# Patient Record
Sex: MALE | Race: WHITE | NOT HISPANIC OR LATINO | Employment: OTHER | URBAN - METROPOLITAN AREA
[De-identification: names, ages, dates, MRNs, and addresses within clinical notes are randomized per-mention and may not be internally consistent; named-entity substitution may affect disease eponyms.]

---

## 2017-02-16 ENCOUNTER — OFFICE VISIT (OUTPATIENT)
Dept: NEUROLOGY | Facility: CLINIC | Age: 62
End: 2017-02-16
Payer: COMMERCIAL

## 2017-02-16 VITALS
BODY MASS INDEX: 30.46 KG/M2 | RESPIRATION RATE: 18 BRPM | HEART RATE: 68 BPM | DIASTOLIC BLOOD PRESSURE: 80 MMHG | HEIGHT: 72 IN | SYSTOLIC BLOOD PRESSURE: 110 MMHG | WEIGHT: 224.88 LBS | TEMPERATURE: 98 F

## 2017-02-16 DIAGNOSIS — G43.009 MIGRAINE WITHOUT AURA, WITHOUT MENTION OF INTRACTABLE MIGRAINE WITHOUT MENTION OF STATUS MIGRAINOSUS: Primary | ICD-10-CM

## 2017-02-16 PROCEDURE — 3079F DIAST BP 80-89 MM HG: CPT | Mod: S$GLB,,, | Performed by: PSYCHIATRY & NEUROLOGY

## 2017-02-16 PROCEDURE — 99214 OFFICE O/P EST MOD 30 MIN: CPT | Mod: S$GLB,,, | Performed by: PSYCHIATRY & NEUROLOGY

## 2017-02-16 PROCEDURE — 3074F SYST BP LT 130 MM HG: CPT | Mod: S$GLB,,, | Performed by: PSYCHIATRY & NEUROLOGY

## 2017-02-16 PROCEDURE — 99999 PR PBB SHADOW E&M-EST. PATIENT-LVL III: CPT | Mod: PBBFAC,,, | Performed by: PSYCHIATRY & NEUROLOGY

## 2017-02-16 RX ORDER — DICLOFENAC POTASSIUM 50 MG/1
1 POWDER, FOR SOLUTION ORAL DAILY PRN
Qty: 9 EACH | Refills: 4 | Status: SHIPPED | OUTPATIENT
Start: 2017-02-16 | End: 2018-08-22

## 2017-02-16 NOTE — PROGRESS NOTES
Subjective:       Patient ID: Booker Back is a 61 y.o. male.    Chief Complaint: Headache  INTERVAL HISTORY  The patient comes for follow up with his headache diary. He has had about 14 but may be up to 20 headaches in a 70 day period as he did not chart while on vacation. He is overall a bit better. He starts treating escalations with Zofran, then go to Ibuprofen, then either Relpax or Migranal and rescues with Fioricet. He averages 1 Fioricet per month.   HPI    The patient is a pleasant 60 y/o male with long time history of headaches. Over the years he has seen neurologists headache specialists and has received expert care. In addition to experiencing tension typt headache and migraine headache, he describes an episode of Ophthalmic Migraine, characterized by loss of vision lasting 2 to 3 hours, not followed by head pain which occurred 10 to 15 years prior to two episodes of Transient Global Amnesia. They happened in 2013 and they were about a month apart. He was admitted to the hospital where imaging was obtained as well as EEG all of which was reportedly negative. He was given the diagnosis of TGA, ex juvantivus. He states that it was after the episodes of TGA when he started having migraine headaches. About 12 years ago he was diagnosed with Medication overuse headache, he was taking Advil 400 mg twice daily. He saw Dr. Nettie Elena who advised him to stop taking Advil. His headaches were resolved but his back pain flared up. His back pain was so severe that he started taking Advil again resulting in headache relapse. In order to treat his back pain he underwent interventional procedures including MBB and RFA without significant relief. When offered a spinal cord stimulator he pursued a second opinion. Instead of having the SCS placed he decided to undergo trans foraminal injections. When done at one level worked about 30% but when given at 3 levels it worked very well. He ascribes a lot of his headaches to  ""sinus problems." He had 4 headaches last month of October, thus far he has had 4 headaches in November. This increased is attributed to recent sinus surgery. His past medical history is also noteworthy for bariatric surgery, a sleeve stomach reduction which has resulted in profound weight loss. He is overall pleased on his current regime which includes Zonisamide 200 mg daily, Zofran 8 mg prn, Migranal alternating with Relpax and limited doses of Fioricet for rescue. For prevention, he has also tried Gabapentin for 1 month, poorly tolerated.  He is not a candidate for Topamax because of risk of kidney stones. He cannot take Beta blockers because of history of asthma. He has taken Elvil in the past for reactive depression with no effect on his headaches.   Please see details of headache characteristics headache below.  Headache questionnaire     1. When did your Headaches start?    Age 9     2. Where are your headaches located?   Usually left temple      3. Your headache's characteristics:  Excruciating, Pressure, Throbbing, Pounding, Stabbing, Like a tight band        4. How long does the headache last?  Hours, days     5. How often does the headache occur?  Worst when sinus problems        6. Are your headaches preceded or accompanied by other symptoms? yes  If yes, please describe. Sometimes photophobia, occasional aura        7. Does the headache awaken you at night? no  If so, how often?            8. Please laura the word that best describes your headache's intensity:    moderate        9. Using a scale of 1 through 10, with 0 = no pain and 10 = the worst pain:  What score is your headache now? 0  What score is your headache at its worst? 9  What score is your headache at its best? 1         10. Possible associated headache symptoms:  [x]  Sensitivity to light  [] Dizziness  [x] Nasal or sinus pressure/ pain   [x] Sensitivity to noise  [] Vertigo  [] Problems with concentration  [] Sensitivity to smells  [] " Ringing in ears  [] Problems with memory    [] Blurred vision  [x] Irritability  [x] Problems with task completion    [] Double vision  [] Anger  [x]  Problems with relaxation  [x] Loss of appetite  [] Anxiety  [x] Neck tightness, Neck pain  [x] Nausea   [x] Nasal congestion  [] Vomiting           11. Headache improving factors:  [x] Sleep  [] Heat  [x] Darkness  [x] Ice  [x] Local pressure [] Menses (period)  [] Massage   [x] Medications:         12. Headache worsening factors:    [x] Fatigue [x] Sneezing  [x] Changes in Weather  [x] Light [] Bending Over [] Stress  [x] Noise [] Ovulation  [] Multiple Sclerosis Flare-Up  [] Smells  [] Menses  [] Food    [x] Coughing [] Alcohol        13. Number of caffeinated drinks per day: 0        14. Number of diet drinks per day: 0     Review of Systems   Constitutional: Positive for activity change (decreased after the sinus surgery). Negative for appetite change, chills, fatigue and fever.   HENT: Positive for congestion, postnasal drip, rhinorrhea and sinus pressure. Negative for dental problem, ear pain, hearing loss, tinnitus, trouble swallowing and voice change.    Eyes: Negative for photophobia, pain and visual disturbance.   Respiratory: Negative for cough, chest tightness and shortness of breath.    Cardiovascular: Negative for chest pain, palpitations and leg swelling.   Gastrointestinal: Negative for abdominal pain, blood in stool, constipation, diarrhea, nausea and vomiting.   Endocrine: Negative for cold intolerance and heat intolerance.   Genitourinary: Negative for difficulty urinating, dysuria and urgency.   Musculoskeletal: Negative for arthralgias, back pain, gait problem, myalgias, neck pain and neck stiffness.   Skin: Negative for color change, pallor and rash.   Neurological: Positive for headaches. Negative for dizziness, tremors, seizures, syncope, facial asymmetry, speech difficulty, weakness, light-headedness and numbness.   Hematological: Negative for  adenopathy. Does not bruise/bleed easily.   Psychiatric/Behavioral: Negative for agitation, behavioral problems, confusion, decreased concentration, self-injury, sleep disturbance and suicidal ideas. The patient is not nervous/anxious.          Past Medical History   Diagnosis Date    Abnormal liver enzymes     Anemia     Arthritis     Asthma     Azotemia     Diverticulosis     Esophageal reflux     H/O Nasal MRSA      Hepatitis, unspecified     Hypotension     Infectious mononucleosis     Insomnia     Instability of knee joint     Left Axillary Furuncle 1/26/16     Migraine     Obesity, unspecified     ALPHONSO on CPAP     Other and unspecified hyperlipidemia     Other seborrheic keratosis     Other specified disorder of male genital organs(608.89)     Pulmonary nodule     Renal stone     Seasonal allergic rhinitis     Snoring     Spontaneous ecchymoses     Unspecified essential hypertension     Unspecified vitamin D deficiency     Variants of migraine, not elsewhere classified, without mention of intractable migraine without mention of status migrainosus      Past Surgical History   Procedure Laterality Date    Appendectomy      [place] tooth extraction      Inguinal hernia repair      Wrist fracture surgery Left     Gastric sleeve  11/2012    Radiofrequency ablation       lumbar back    Esophageal dilation       2016    Transformal injections       x 2     Family History   Problem Relation Age of Onset    Hypertension Mother     Stroke Mother     Cancer Father      throat    Alcohol abuse Father     Heart disease Father     Heart attacks under age 50 Father 49     Social History     Social History    Marital status:      Spouse name: N/A    Number of children: N/A    Years of education: N/A     Occupational History    Not on file.     Social History Main Topics    Smoking status: Never Smoker    Smokeless tobacco: Not on file    Alcohol use No    Drug use: No     Sexual activity: Not on file     Other Topics Concern    Not on file     Social History Narrative     Allergies   Allergen Reactions    Tetracyclines Rash       Current Outpatient Prescriptions:     butalbital-acetaminophen-caffeine -40 mg (FIORICET, ESGIC) -40 mg per tablet, Take 1 tablet by mouth 3 (three) times daily as needed for Pain., Disp: 10 tablet, Rfl: 3    celecoxib (CELEBREX) 200 MG capsule, Take 200 mg by mouth as needed for Pain., Disp: , Rfl:     dexlansoprazole (DEXILANT) 30 mg CpDM, Take 60 mg by mouth once daily. , Disp: , Rfl:     eszopiclone 3 mg Tab, Take 1 tablet by mouth every evening., Disp: , Rfl:     ferrous sulfate 325 mg (65 mg iron) Tab tablet, Take 325 mg by mouth 2 (two) times daily. , Disp: , Rfl: 2    hydrochlorothiazide (MICROZIDE) 12.5 mg capsule, Take 1 capsule (12.5 mg total) by mouth once daily., Disp: 90 capsule, Rfl: 0    irbesartan (AVAPRO) 150 MG tablet, Take 1 tablet (150 mg total) by mouth every evening. (Patient taking differently: Take 150 mg by mouth once daily. ), Disp: 90 tablet, Rfl: 3    levocetirizine (XYZAL) 5 MG tablet, Take 5 mg by mouth every evening.  , Disp: , Rfl:     melatonin 10 mg Cap, Take 1 capsule by mouth every evening. , Disp: , Rfl:     METHYLCELLULOSE, WITH SUGAR, (CITRUCEL, SUCROSE, ORAL), Take 1 Dose by mouth every evening., Disp: , Rfl:     ondansetron (ZOFRAN-ODT) 8 MG TbDL, Take 1 tablet (8 mg total) by mouth every 6 (six) hours as needed., Disp: 30 tablet, Rfl: 3    oxycodone-acetaminophen (PERCOCET)  mg per tablet, Take 1 tablet by mouth every 4 (four) hours as needed for Pain., Disp: , Rfl:     oxymetazoline (AFRIN) 0.05 % nasal spray, 1 spray by Nasal route every evening. , Disp: , Rfl:     phenobarb-hyoscy-atropine-scop (DONNATAL) 16.2 mg-0.1037 mg/5 mL (5 mL) Elix, Take 15 mLs by mouth daily as needed., Disp: , Rfl:     polyethylene glycol (GLYCOLAX) 17 gram/dose powder, Take 17 g by mouth once daily. ,  Disp: , Rfl:     RELPAX 40 mg tablet, Take 1 tablet (40 mg total) by mouth daily as needed., Disp: 9 tablet, Rfl: 4    tizanidine (ZANAFLEX) 4 MG tablet, TAKE 1 TABLET BY MOUTH THREE TIMES DAILY (Patient taking differently: TAKE 1 TABLET BY MOUTH THREE TIMES DAILY prn), Disp: 270 tablet, Rfl: 0    vitamin D 1000 units Tab, Take 5,000 Units by mouth once daily., Disp: , Rfl:     vitamin E 100 UNIT capsule, Take 400 Units by mouth 2 (two) times daily. , Disp: , Rfl:     XOPENEX HFA 45 mcg/actuation inhaler, , Disp: , Rfl: 6    zonisamide (ZONEGRAN) 100 MG Cap, Take 200 mg by mouth once daily. , Disp: , Rfl: 2    dihydroergotamine (MIGRANAL) 0.5 mg/pump act. (4 mg/mL) nasal spray, Use in one nostril as directed.  No more than 4 sprays in one hour Please provide the patient with 90 day supply, Disp: 24 mL, Rfl: 3      Objective:      Vitals:    02/16/17 1552   BP: 110/80   Pulse: 68   Resp: 18   Temp: 98 °F (36.7 °C)     Body mass index is 30.5 kg/(m^2).    Physical Exam    Constitutional:     He appears well-developed and well-nourished. He is well groomed  HENT:    Head: Atraumatic, oral and nasal mucosa intact  Eyes: Conjunctivae and EOM are normal. Pupils are equal, round, and reactive to light OU  Neck: Neck supple. No thyromegaly present  Cardiovascular: Normal rate and normal heart sounds  No murmur heard  Pulmonary/Chest: Effort normal and breath sounds normal  Skin: Skin is warm and dry  Psychiatric: Normal mood and affect     Neuro exam:  MINI MENTAL ESTATE EXAM 30/30  Mental status:  Awake, attentive, Alert, oriented to self, place, year and month  Language function is intact  Naming, repetition and spontaneous meaningful speech expression are intact  Speech fluency is good and speech is clear  Remote and recent memory are good  Patient able to count backwards by 7    No findings to suggest executive dysfuntion    Patient has adequate insight    Mood is stable    Cranial Nerves:  Smell was not formally  evaluated  Cranial Nerves II - XII: intact  Pursuits were smooth, normal saccades, no nystagmus OU  Motor - facial movement was symmetrical and normal     Palate moved well and was symmetrical with normal palatal and oral sensation  Tongue movements were full    Coordination:     Rapid alternating movements and rapid finger tapping - normal bilaterally  Finger to nose - normal and symmetric bilaterally     Motor:  Normal muscle bulk and symmetry. No fasciculations were noted    No pronator drift  Strength 5/5 bilaterally     Reflexes:  Tendon reflexes were 2 + at biceps, triceps, brachioradialis, patellar, and 1+ Achilles bilaterally    Gait: Normal gait.         Assessment and Plan     Migraine without Aura, doing fairly well on his current protocol. Continue with Zonisamide 200 mg daily and B2 400 mg daily for added prevention. For acute treatment continue with Zofran prn, and Relpax, alternating with Migranal. Can also use Cambia with limit of #9 per month, and rescue with Fioricet. He has averaged 1 dose per month.  Status post 2 episodes of Transient Global Amnesia. Obtain PET scan if recurrence  Status post Medication Overuse Headache, resolved. Patient well educated regarding this diagnosis    RTC in 3 months with headache diary    Counseling:  More than 50% of the 25 minute encounter was spent face to face counseling the patient regarding current status and future plan of care as well as side of the medications. All questions were answered to patient's satisfaction      Dana Conn M.D  Medical Director, Headache and Facial Pain  [facility]

## 2017-02-16 NOTE — MR AVS SNAPSHOT
[facility]  [address] [place] [address]  Phone: [phone]  Fax: [phone]                  Booker Back   2017 3:45 PM   Office Visit    Description:  Male : 1955   Provider:  Chasidy Conn MD   Department:  [facility] - [facility]rology           Reason for Visit     Headache                To Do List           Future Appointments        Provider Department Dept Phone    2017 3:30 PM Chasidy Conn MD [facility] [phone]    2017 1:15 PM Chasidy Conn MD [facility] [phone]      Goals (5 Years of Data)     None       These Medications        Disp Refills Start End    diclofenac potassium (CAMBIA) 50 mg PwPk 9 each 4 2017 3/18/2017    Take 1 Package by mouth daily as needed. - Oral    Pharmacy: [facility] [address]  #: [phone]         [facility]     [facility] -  Assistance  Registered nurses in the [facility] provide clinical advisement, health education, appointment booking, and other advisory services.  Call for this free service at [phone].             Medications           Message regarding Medications     Verify the changes and/or additions to your medication regime listed below are the same as discussed with your clinician today.  If any of these changes or additions are incorrect, please notify your healthcare provider.        START taking these NEW medications        Refills    diclofenac potassium (CAMBIA) 50 mg PwPk 4    Sig: Take 1 Package by mouth daily as needed.    Class: Normal    Route: Oral      STOP taking these medications     oxymetazoline (AFRIN) 0.05 % nasal spray 1 spray by Nasal route every evening.            Verify that the below list of medications is an accurate representation of the medications you are currently taking.  If none reported, the list may be blank. If incorrect,  please contact your healthcare provider. Carry this list with you in case of emergency.           Current Medications     celecoxib (CELEBREX) 200 MG capsule Take 200 mg by mouth as needed for Pain.    dexlansoprazole (DEXILANT) 30 mg CpDM Take 60 mg by mouth once daily.     diclofenac potassium (CAMBIA) 50 mg PwPk Take 1 Package by mouth daily as needed.    dihydroergotamine (MIGRANAL) 0.5 mg/pump act. (4 mg/mL) nasal spray Use in one nostril as directed.  No more than 4 sprays in one hour  Please provide the patient with 90 day supply    eletriptan (RELPAX) 40 MG tablet may repeat in 2 hours if necessary  Allow 24 tablets, a 90 day supply    eszopiclone 3 mg Tab Take 1 tablet by mouth every evening.    ferrous sulfate 325 mg (65 mg iron) Tab tablet Take 325 mg by mouth 2 (two) times daily.     hydrochlorothiazide (MICROZIDE) 12.5 mg capsule Take 1 capsule (12.5 mg total) by mouth once daily.    irbesartan (AVAPRO) 150 MG tablet Take 1 tablet (150 mg total) by mouth every evening.    levocetirizine (XYZAL) 5 MG tablet Take 5 mg by mouth every evening.      melatonin 10 mg Cap Take 1 capsule by mouth every evening.     METHYLCELLULOSE, WITH SUGAR, (CITRUCEL, SUCROSE, ORAL) Take 1 Dose by mouth every evening.    ondansetron (ZOFRAN-ODT) 8 MG TbDL Take 1 tablet (8 mg total) by mouth every 6 (six) hours as needed.    oxycodone-acetaminophen (PERCOCET)  mg per tablet Take 1 tablet by mouth every 4 (four) hours as needed for Pain.    phenobarb-hyoscy-atropine-scop (DONNATAL) 16.2 mg-0.1037 mg/5 mL (5 mL) Elix Take 15 mLs by mouth daily as needed.    polyethylene glycol (GLYCOLAX) 17 gram/dose powder Take 17 g by mouth once daily.     tizanidine (ZANAFLEX) 4 MG tablet TAKE 1 TABLET BY MOUTH THREE TIMES DAILY    tizanidine (ZANAFLEX) 4 MG tablet TAKE 1 TABLET BY MOUTH THREE TIMES DAILY    vitamin D 1000 units Tab Take 5,000 Units by mouth once daily.    vitamin E 100 UNIT capsule Take 400 Units by mouth 2 (two) times  daily.     XOPENEX HFA 45 mcg/actuation inhaler     zonisamide (ZONEGRAN) 100 MG Cap Take 200 mg by mouth once daily.            Clinical Reference Information           Your Vitals Were     BP Pulse Temp Resp Height Weight    110/80 (BP Location: Left arm, Patient Position: Sitting, BP Method: Automatic) 68 98 °F (36.7 °C) (Oral) 18 6' (1.829 m) 102 kg (224 lb 13.9 oz)    BMI                30.5 kg/m2          Blood Pressure          Most Recent Value    BP  110/80      Allergies as of 2/16/2017     Tetracyclines      Immunizations Administered on Date of Encounter - 2/16/2017     None      Language Assistance Services     ATTENTION: Language assistance services are available, free of charge. Please call [phone].      ATENCIÓN: Si newton nayely, tiene a munguia disposición servicios gratuitos de asistencia lingüística. Llame al [phone].     CHÚ Ý: N?u b?n nói Ti?ng Vi?t, có các d?ch v? h? tr? ngôn ng? mi?n phí dành cho b?n. G?i s? [phone].         [facility] complies with applicable Federal civil rights laws and does not discriminate on the basis of race, color, national origin, age, disability, or sex.

## 2017-02-20 ENCOUNTER — TELEPHONE (OUTPATIENT)
Dept: NEUROLOGY | Facility: CLINIC | Age: 62
End: 2017-02-20

## 2017-02-21 RX ORDER — BUTALBITAL, ACETAMINOPHEN AND CAFFEINE 50; 325; 40 MG/1; MG/1; MG/1
1 TABLET ORAL 3 TIMES DAILY PRN
Qty: 10 TABLET | Refills: 3 | Status: SHIPPED | OUTPATIENT
Start: 2017-02-21 | End: 2017-03-23

## 2017-03-17 RX ORDER — ELETRIPTAN HYDROBROMIDE 40 MG/1
40 TABLET, FILM COATED ORAL
Qty: 24 TABLET | Refills: 2 | Status: CANCELLED | OUTPATIENT
Start: 2017-03-17

## 2017-03-20 RX ORDER — ELETRIPTAN HYDROBROMIDE 40 MG/1
TABLET, FILM COATED ORAL
Qty: 24 TABLET | Refills: 2 | OUTPATIENT
Start: 2017-03-20 | End: 2019-01-24 | Stop reason: SDUPTHER

## 2017-03-20 NOTE — TELEPHONE ENCOUNTER
Please check the directions first.    Received fax from [facility] pharmacy requesting a refill on Relpax.

## 2017-04-12 ENCOUNTER — PATIENT MESSAGE (OUTPATIENT)
Dept: NEUROLOGY | Facility: CLINIC | Age: 62
End: 2017-04-12

## 2017-04-17 PROBLEM — G43.719 CHRONIC MIGRAINE WITHOUT AURA, WITH INTRACTABLE MIGRAINE, SO STATED, WITHOUT MENTION OF STATUS MIGRAINOSUS: Status: ACTIVE | Noted: 2017-04-17

## 2017-04-19 ENCOUNTER — PATIENT MESSAGE (OUTPATIENT)
Dept: NEUROLOGY | Facility: CLINIC | Age: 62
End: 2017-04-19

## 2017-04-20 NOTE — TELEPHONE ENCOUNTER
Patient would like to know how long to go without the pain meds and also if his compound cream is okay to use or may cause rebound headaches. Please see below and advise.

## 2017-04-28 ENCOUNTER — PATIENT MESSAGE (OUTPATIENT)
Dept: NEUROLOGY | Facility: CLINIC | Age: 62
End: 2017-04-28

## 2017-05-08 ENCOUNTER — PATIENT MESSAGE (OUTPATIENT)
Dept: NEUROLOGY | Facility: CLINIC | Age: 62
End: 2017-05-08

## 2017-05-22 ENCOUNTER — PATIENT MESSAGE (OUTPATIENT)
Dept: NEUROLOGY | Facility: CLINIC | Age: 62
End: 2017-05-22

## 2017-05-23 RX ORDER — BUTALBITAL, ASPIRIN AND CAFFEINE 50; 325; 40 MG/1; MG/1; MG/1
1 TABLET ORAL 4 TIMES DAILY PRN
Qty: 15 TABLET | Refills: 2 | Status: SHIPPED | OUTPATIENT
Start: 2017-05-23 | End: 2017-06-22

## 2017-05-30 ENCOUNTER — OFFICE VISIT (OUTPATIENT)
Dept: NEUROLOGY | Facility: CLINIC | Age: 62
End: 2017-05-30
Payer: COMMERCIAL

## 2017-05-30 VITALS
SYSTOLIC BLOOD PRESSURE: 137 MMHG | HEIGHT: 72 IN | BODY MASS INDEX: 31.36 KG/M2 | DIASTOLIC BLOOD PRESSURE: 80 MMHG | WEIGHT: 231.5 LBS | HEART RATE: 62 BPM | RESPIRATION RATE: 17 BRPM

## 2017-05-30 DIAGNOSIS — N28.9 RENAL INSUFFICIENCY: ICD-10-CM

## 2017-05-30 DIAGNOSIS — I10 ESSENTIAL HYPERTENSION: ICD-10-CM

## 2017-05-30 DIAGNOSIS — K21.9 GASTROESOPHAGEAL REFLUX DISEASE WITHOUT ESOPHAGITIS: ICD-10-CM

## 2017-05-30 DIAGNOSIS — G47.00 INSOMNIA, UNSPECIFIED TYPE: ICD-10-CM

## 2017-05-30 DIAGNOSIS — G43.009 MIGRAINE WITHOUT AURA, WITHOUT MENTION OF INTRACTABLE MIGRAINE WITHOUT MENTION OF STATUS MIGRAINOSUS: Primary | ICD-10-CM

## 2017-05-30 PROCEDURE — 99999 PR PBB SHADOW E&M-EST. PATIENT-LVL III: CPT | Mod: PBBFAC,,, | Performed by: PSYCHIATRY & NEUROLOGY

## 2017-05-30 PROCEDURE — 99214 OFFICE O/P EST MOD 30 MIN: CPT | Mod: S$GLB,,, | Performed by: PSYCHIATRY & NEUROLOGY

## 2017-05-30 RX ORDER — LIDOCAINE AND PRILOCAINE 25; 25 MG/G; MG/G
CREAM TOPICAL
COMMUNITY
Start: 2017-04-13 | End: 2018-04-03

## 2017-05-30 RX ORDER — IBUPROFEN 800 MG/1
800 TABLET ORAL
COMMUNITY
End: 2018-01-11

## 2017-05-30 RX ORDER — BECLOMETHASONE DIPROPIONATE 80 UG/1
1 AEROSOL, METERED NASAL 2 TIMES DAILY
COMMUNITY
Start: 2017-03-16

## 2017-05-30 RX ORDER — RIBOFLAVIN (VITAMIN B2) 400 MG
1 TABLET ORAL NIGHTLY
COMMUNITY
End: 2021-07-01

## 2017-05-30 RX ORDER — ACETAMINOPHEN 500 MG
5000 TABLET ORAL
COMMUNITY
End: 2017-05-30

## 2017-05-30 RX ORDER — BUTALBITAL, ACETAMINOPHEN AND CAFFEINE 50; 325; 40 MG/1; MG/1; MG/1
TABLET ORAL
Refills: 3 | COMMUNITY
Start: 2017-05-11 | End: 2017-07-03 | Stop reason: SDUPTHER

## 2017-05-30 RX ORDER — ONDANSETRON 4 MG/1
4 TABLET, FILM COATED ORAL
COMMUNITY
Start: 2017-03-17 | End: 2017-09-22 | Stop reason: DRUGHIGH

## 2017-05-30 RX ORDER — OXYCODONE AND ACETAMINOPHEN 10; 325 MG/1; MG/1
1 TABLET ORAL
COMMUNITY
End: 2020-05-22

## 2017-05-30 NOTE — PROGRESS NOTES
Subjective:       Patient ID: Booker Back is a 61 y.o. male.    Chief Complaint: Headache  INTERVAL HISTORY  The patient comes for follow up with his headache diary. He has daily headache of varying intensity ranging between 3 and 5. He is overall a bit better since the IV infusions, the intensity and frequency as well as the use of medicines is reduced. He starts treating escalations with Zofran, then go to Ibuprofen, then either Relpax or Migranal and rescues with Fioricet. He averages 10 Fioricet per month. He is also on Percocet for other conditions but takes about 40 per month.  HPI    The patient is a pleasant 60 y/o male with long time history of headaches. Over the years he has seen neurologists headache specialists and has received expert care. In addition to experiencing tension typt headache and migraine headache, he describes an episode of Ophthalmic Migraine, characterized by loss of vision lasting 2 to 3 hours, not followed by head pain which occurred 10 to 15 years prior to two episodes of Transient Global Amnesia. They happened in 2013 and they were about a month apart. He was admitted to the hospital where imaging was obtained as well as EEG all of which was reportedly negative. He was given the diagnosis of TGA, ex juvantivus. He states that it was after the episodes of TGA when he started having migraine headaches. About 12 years ago he was diagnosed with Medication overuse headache, he was taking Advil 400 mg twice daily. He saw Dr. Nettie Elena who advised him to stop taking Advil. His headaches were resolved but his back pain flared up. His back pain was so severe that he started taking Advil again resulting in headache relapse. In order to treat his back pain he underwent interventional procedures including MBB and RFA without significant relief. When offered a spinal cord stimulator he pursued a second opinion. Instead of having the SCS placed he decided to undergo trans foraminal injections.  "When done at one level worked about 30% but when given at 3 levels it worked very well. He ascribes a lot of his headaches to "sinus problems." He had 4 headaches last month of October, thus far he has had 4 headaches in November. This increased is attributed to recent sinus surgery. His past medical history is also noteworthy for bariatric surgery, a sleeve stomach reduction which has resulted in profound weight loss. He is overall pleased on his current regime which includes Zonisamide 200 mg daily, Zofran 8 mg prn, Migranal alternating with Relpax and limited doses of Fioricet for rescue. For prevention, he has also tried Gabapentin for 1 month, poorly tolerated. Botox, 3 rounds by Dr. Ky Jensen provided no relief.  He is not a candidate for Topamax because of risk of kidney stones. He cannot take Beta blockers because of history of asthma. He has taken Elavil in the past for reactive depression with no effect on his headaches.   Please see details of headache characteristics headache below.  Headache questionnaire     1. When did your Headaches start?    Age 9     2. Where are your headaches located?   Usually left temple      3. Your headache's characteristics:  Excruciating, Pressure, Throbbing, Pounding, Stabbing, Like a tight band        4. How long does the headache last?  Hours, days     5. How often does the headache occur?  Worst when sinus problems        6. Are your headaches preceded or accompanied by other symptoms? yes  If yes, please describe. Sometimes photophobia, occasional aura        7. Does the headache awaken you at night? no  If so, how often?            8. Please laura the word that best describes your headache's intensity:    moderate        9. Using a scale of 1 through 10, with 0 = no pain and 10 = the worst pain:  What score is your headache now? 0  What score is your headache at its worst? 9  What score is your headache at its best? 1         10. Possible associated headache " symptoms:  [x]  Sensitivity to light  [] Dizziness  [x] Nasal or sinus pressure/ pain   [x] Sensitivity to noise  [] Vertigo  [] Problems with concentration  [] Sensitivity to smells  [] Ringing in ears  [] Problems with memory    [] Blurred vision  [x] Irritability  [x] Problems with task completion    [] Double vision  [] Anger  [x]  Problems with relaxation  [x] Loss of appetite  [] Anxiety  [x] Neck tightness, Neck pain  [x] Nausea   [x] Nasal congestion  [] Vomiting           11. Headache improving factors:  [x] Sleep  [] Heat  [x] Darkness  [x] Ice  [x] Local pressure [] Menses (period)  [] Massage   [x] Medications:         12. Headache worsening factors:    [x] Fatigue [x] Sneezing  [x] Changes in Weather  [x] Light [] Bending Over [] Stress  [x] Noise [] Ovulation  [] Multiple Sclerosis Flare-Up  [] Smells  [] Menses  [] Food    [x] Coughing [] Alcohol        13. Number of caffeinated drinks per day: 0        14. Number of diet drinks per day: 0     Review of Systems   Constitutional:  Negative for appetite change, chills, fatigue and fever.   HENT: Negative for dental problem, ear pain, hearing loss, tinnitus, trouble swallowing and voice change.    Eyes: Negative for photophobia, pain and visual disturbance.   Respiratory: Negative for cough, chest tightness and shortness of breath.    Cardiovascular: Negative for chest pain, palpitations and leg swelling.   Gastrointestinal: Negative for abdominal pain, blood in stool, constipation, diarrhea, nausea and vomiting.   Endocrine: Negative for cold intolerance and heat intolerance.   Genitourinary: Negative for difficulty urinating, dysuria and urgency.   Musculoskeletal: Negative for arthralgias, back pain, gait problem, myalgias, neck pain and neck stiffness.   Skin: Negative for color change, pallor and rash.   Neurological: Positive for headaches. Negative for dizziness, tremors, seizures, syncope, facial asymmetry, speech difficulty, weakness,  light-headedness and numbness.   Hematological: Negative for adenopathy. Does not bruise/bleed easily.   Psychiatric/Behavioral: Negative for agitation, behavioral problems, confusion, decreased concentration, self-injury, sleep disturbance and suicidal ideas. The patient is not nervous/anxious.          Past Medical History   Diagnosis Date    Abnormal liver enzymes     Anemia     Arthritis     Asthma     Azotemia     Diverticulosis     Esophageal reflux     H/O Nasal MRSA      Hepatitis, unspecified     Hypotension     Infectious mononucleosis     Insomnia     Instability of knee joint     Left Axillary Furuncle 1/26/16     Migraine     Obesity, unspecified     ALPHONSO on CPAP     Other and unspecified hyperlipidemia     Other seborrheic keratosis     Other specified disorder of male genital organs(608.89)     Pulmonary nodule     Renal stone     Seasonal allergic rhinitis     Snoring     Spontaneous ecchymoses     Unspecified essential hypertension     Unspecified vitamin D deficiency     Variants of migraine, not elsewhere classified, without mention of intractable migraine without mention of status migrainosus      Past Surgical History   Procedure Laterality Date    Appendectomy      [place] tooth extraction      Inguinal hernia repair      Wrist fracture surgery Left     Gastric sleeve  11/2012    Radiofrequency ablation       lumbar back    Esophageal dilation       2016    Transformal injections       x 2     Family History   Problem Relation Age of Onset    Hypertension Mother     Stroke Mother     Cancer Father      throat    Alcohol abuse Father     Heart disease Father     Heart attacks under age 50 Father 49     Social History     Social History    Marital status:      Spouse name: N/A    Number of children: N/A    Years of education: N/A     Occupational History    Not on file.     Social History Main Topics    Smoking status: Never Smoker    Smokeless  tobacco: Not on file    Alcohol use No    Drug use: No    Sexual activity: Not on file     Other Topics Concern    Not on file     Social History Narrative     Allergies   Allergen Reactions    Tetracyclines Rash       Current Outpatient Prescriptions:     butalbital-acetaminophen-caffeine -40 mg (FIORICET, ESGIC) -40 mg per tablet, Take 1 tablet by mouth 3 (three) times daily as needed for Pain., Disp: 10 tablet, Rfl: 3    celecoxib (CELEBREX) 200 MG capsule, Take 200 mg by mouth as needed for Pain., Disp: , Rfl:     dexlansoprazole (DEXILANT) 30 mg CpDM, Take 60 mg by mouth once daily. , Disp: , Rfl:     eszopiclone 3 mg Tab, Take 1 tablet by mouth every evening., Disp: , Rfl:     ferrous sulfate 325 mg (65 mg iron) Tab tablet, Take 325 mg by mouth 2 (two) times daily. , Disp: , Rfl: 2    hydrochlorothiazide (MICROZIDE) 12.5 mg capsule, Take 1 capsule (12.5 mg total) by mouth once daily., Disp: 90 capsule, Rfl: 0    irbesartan (AVAPRO) 150 MG tablet, Take 1 tablet (150 mg total) by mouth every evening. (Patient taking differently: Take 150 mg by mouth once daily. ), Disp: 90 tablet, Rfl: 3    levocetirizine (XYZAL) 5 MG tablet, Take 5 mg by mouth every evening.  , Disp: , Rfl:     melatonin 10 mg Cap, Take 1 capsule by mouth every evening. , Disp: , Rfl:     METHYLCELLULOSE, WITH SUGAR, (CITRUCEL, SUCROSE, ORAL), Take 1 Dose by mouth every evening., Disp: , Rfl:     ondansetron (ZOFRAN-ODT) 8 MG TbDL, Take 1 tablet (8 mg total) by mouth every 6 (six) hours as needed., Disp: 30 tablet, Rfl: 3    oxycodone-acetaminophen (PERCOCET)  mg per tablet, Take 1 tablet by mouth every 4 (four) hours as needed for Pain., Disp: , Rfl:     oxymetazoline (AFRIN) 0.05 % nasal spray, 1 spray by Nasal route every evening. , Disp: , Rfl:     phenobarb-hyoscy-atropine-scop (DONNATAL) 16.2 mg-0.1037 mg/5 mL (5 mL) Elix, Take 15 mLs by mouth daily as needed., Disp: , Rfl:     polyethylene glycol  (GLYCOLAX) 17 gram/dose powder, Take 17 g by mouth once daily. , Disp: , Rfl:     RELPAX 40 mg tablet, Take 1 tablet (40 mg total) by mouth daily as needed., Disp: 9 tablet, Rfl: 4    tizanidine (ZANAFLEX) 4 MG tablet, TAKE 1 TABLET BY MOUTH THREE TIMES DAILY (Patient taking differently: TAKE 1 TABLET BY MOUTH THREE TIMES DAILY prn), Disp: 270 tablet, Rfl: 0    vitamin D 1000 units Tab, Take 5,000 Units by mouth once daily., Disp: , Rfl:     vitamin E 100 UNIT capsule, Take 400 Units by mouth 2 (two) times daily. , Disp: , Rfl:     XOPENEX HFA 45 mcg/actuation inhaler, , Disp: , Rfl: 6    zonisamide (ZONEGRAN) 100 MG Cap, Take 200 mg by mouth once daily. , Disp: , Rfl: 2    dihydroergotamine (MIGRANAL) 0.5 mg/pump act. (4 mg/mL) nasal spray, Use in one nostril as directed.  No more than 4 sprays in one hour Please provide the patient with 90 day supply, Disp: 24 mL, Rfl: 3      Objective:      Vitals:    05/30/17 1322   BP: 137/80   Pulse: 62   Resp: 17     Body mass index is 31.39 kg/m².      Physical Exam    Constitutional:     He appears well-developed and well-nourished. He is well groomed  HENT:    Head: Atraumatic, oral and nasal mucosa intact  Eyes: Conjunctivae and EOM are normal. Pupils are equal, round, and reactive to light OU  Neck: Neck supple. No thyromegaly present  Cardiovascular: Normal rate and normal heart sounds  No murmur heard  Pulmonary/Chest: Effort normal and breath sounds normal  Skin: Skin is warm and dry  Psychiatric: Normal mood and affect     Neuro exam:  MINI MENTAL ESTATE EXAM 30/30  Mental status:  Awake, attentive, Alert, oriented to self, place, year and month  Language function is intact  Naming, repetition and spontaneous meaningful speech expression are intact  Speech fluency is good and speech is clear  Remote and recent memory are good  Patient able to count backwards by 7    No findings to suggest executive dysfuntion    Patient has adequate insight    Mood is  stable    Cranial Nerves:  Smell was not formally evaluated  Cranial Nerves II - XII: intact  Pursuits were smooth, normal saccades, no nystagmus OU  Motor - facial movement was symmetrical and normal     Palate moved well and was symmetrical with normal palatal and oral sensation  Tongue movements were full    Coordination:     Rapid alternating movements and rapid finger tapping - normal bilaterally  Finger to nose - normal and symmetric bilaterally     Motor:  Normal muscle bulk and symmetry. No fasciculations were noted    No pronator drift  Strength 5/5 bilaterally     Reflexes:  Tendon reflexes were 2 + at biceps, triceps, brachioradialis, patellar, and 1+ Achilles bilaterally    Gait: Normal gait.         Assessment and Plan     Migraine without Aura, doing fairly well on his current protocol. Continue with Zonisamide 200 mg daily and B2 400 mg daily for added prevention. For acute treatment continue with Zofran prn, and Relpax, alternating with Migranal. Can also use Cambia with limit of #9 per month, and rescue with Fioricet. He has averaged 1 dose per month.  Medication Overuse Headache. Working on tapering off medication, he knows he weill be fine once he tapers off acute medications  Status post 2 episodes of Transient Global Amnesia. Obtain PET scan if recurrence      RTC in 3 months with headache diary    Counseling:  More than 50% of the 25 minute encounter was spent face to face counseling the patient regarding current status and future plan of care as well as side of the medications. All questions were answered to patient's satisfaction      Dana Conn M.D  Medical Director, Headache and Facial Pain  [facility] [place]

## 2017-07-03 RX ORDER — BUTALBITAL, ACETAMINOPHEN AND CAFFEINE 50; 325; 40 MG/1; MG/1; MG/1
1 TABLET ORAL EVERY 6 HOURS PRN
Qty: 15 TABLET | Refills: 0 | Status: SHIPPED | OUTPATIENT
Start: 2017-07-03 | End: 2017-08-02 | Stop reason: SDUPTHER

## 2017-07-03 RX ORDER — BUTALBITAL, ASPIRIN, AND CAFFEINE 325; 50; 40 MG/1; MG/1; MG/1
CAPSULE ORAL
Refills: 2 | COMMUNITY
Start: 2017-06-25 | End: 2017-09-07

## 2017-07-05 ENCOUNTER — PATIENT MESSAGE (OUTPATIENT)
Dept: NEUROLOGY | Facility: CLINIC | Age: 62
End: 2017-07-05

## 2017-07-06 ENCOUNTER — PATIENT MESSAGE (OUTPATIENT)
Dept: NEUROLOGY | Facility: CLINIC | Age: 62
End: 2017-07-06

## 2017-08-02 RX ORDER — BUTALBITAL, ACETAMINOPHEN AND CAFFEINE 50; 325; 40 MG/1; MG/1; MG/1
1 TABLET ORAL EVERY 6 HOURS PRN
Qty: 15 TABLET | Refills: 0 | Status: SHIPPED | OUTPATIENT
Start: 2017-08-02 | End: 2017-08-25 | Stop reason: SDUPTHER

## 2017-08-20 ENCOUNTER — PATIENT MESSAGE (OUTPATIENT)
Dept: NEUROLOGY | Facility: CLINIC | Age: 62
End: 2017-08-20

## 2017-08-20 DIAGNOSIS — G43.009 MIGRAINE WITHOUT AURA, WITHOUT MENTION OF INTRACTABLE MIGRAINE WITHOUT MENTION OF STATUS MIGRAINOSUS: Primary | ICD-10-CM

## 2017-08-21 RX ORDER — ZONISAMIDE 100 MG/1
200 CAPSULE ORAL DAILY
Qty: 180 CAPSULE | Refills: 2 | Status: SHIPPED | OUTPATIENT
Start: 2017-08-21 | End: 2019-07-15 | Stop reason: ALTCHOICE

## 2017-08-25 ENCOUNTER — PATIENT MESSAGE (OUTPATIENT)
Dept: NEUROLOGY | Facility: CLINIC | Age: 62
End: 2017-08-25

## 2017-08-25 DIAGNOSIS — G43.009 MIGRAINE WITHOUT AURA, WITHOUT MENTION OF INTRACTABLE MIGRAINE WITHOUT MENTION OF STATUS MIGRAINOSUS: Primary | ICD-10-CM

## 2017-08-25 RX ORDER — BUTALBITAL, ACETAMINOPHEN AND CAFFEINE 50; 325; 40 MG/1; MG/1; MG/1
1 TABLET ORAL EVERY 6 HOURS PRN
Qty: 15 TABLET | Refills: 0 | Status: SHIPPED | OUTPATIENT
Start: 2017-08-30 | End: 2017-09-27 | Stop reason: SDUPTHER

## 2017-09-07 ENCOUNTER — OFFICE VISIT (OUTPATIENT)
Dept: NEUROLOGY | Facility: CLINIC | Age: 62
End: 2017-09-07
Payer: COMMERCIAL

## 2017-09-07 VITALS
WEIGHT: 223.56 LBS | RESPIRATION RATE: 18 BRPM | HEART RATE: 85 BPM | DIASTOLIC BLOOD PRESSURE: 92 MMHG | HEIGHT: 72 IN | TEMPERATURE: 98 F | BODY MASS INDEX: 30.28 KG/M2 | SYSTOLIC BLOOD PRESSURE: 145 MMHG

## 2017-09-07 DIAGNOSIS — I10 ESSENTIAL HYPERTENSION: ICD-10-CM

## 2017-09-07 DIAGNOSIS — G43.719 CHRONIC MIGRAINE WITHOUT AURA, WITH INTRACTABLE MIGRAINE, SO STATED, WITHOUT MENTION OF STATUS MIGRAINOSUS: Primary | ICD-10-CM

## 2017-09-07 PROCEDURE — 3077F SYST BP >= 140 MM HG: CPT | Mod: S$GLB,,, | Performed by: PSYCHIATRY & NEUROLOGY

## 2017-09-07 PROCEDURE — 99214 OFFICE O/P EST MOD 30 MIN: CPT | Mod: S$GLB,,, | Performed by: PSYCHIATRY & NEUROLOGY

## 2017-09-07 PROCEDURE — 3080F DIAST BP >= 90 MM HG: CPT | Mod: S$GLB,,, | Performed by: PSYCHIATRY & NEUROLOGY

## 2017-09-07 PROCEDURE — 3008F BODY MASS INDEX DOCD: CPT | Mod: S$GLB,,, | Performed by: PSYCHIATRY & NEUROLOGY

## 2017-09-07 PROCEDURE — 99999 PR PBB SHADOW E&M-EST. PATIENT-LVL III: CPT | Mod: PBBFAC,,, | Performed by: PSYCHIATRY & NEUROLOGY

## 2017-09-07 NOTE — PROGRESS NOTES
Subjective:       Patient ID: Bokoer Back is a 61 y.o. male.    Chief Complaint: Headache  INTERVAL HISTORY  The patient comes for follow up with his headache diary. Review of the diaries reveal a modest reduction in the use of migraine medications. He starts treating escalations with Zofran, then go to Ibuprofen, then either Relpax or Migranal and rescues with Fioricet. He averages 10 Fioricet per month. He is also on Percocet for his back pain. He is tapering off.  He had RFA on the lumbar area 3 weeks ago and on the left lumbar today. This is coupled with two solid months of physical therapy.    HPI    The patient is a pleasant 60 y/o male with long time history of headaches. Over the years he has seen neurologists headache specialists and has received expert care. In addition to experiencing tension typt headache and migraine headache, he describes an episode of Ophthalmic Migraine, characterized by loss of vision lasting 2 to 3 hours, not followed by head pain which occurred 10 to 15 years prior to two episodes of Transient Global Amnesia. They happened in 2013 and they were about a month apart. He was admitted to the hospital where imaging was obtained as well as EEG all of which was reportedly negative. He was given the diagnosis of TGA, ex juvantivus. He states that it was after the episodes of TGA when he started having migraine headaches. About 12 years ago he was diagnosed with Medication overuse headache, he was taking Advil 400 mg twice daily. He saw Dr. Nettie Elena who advised him to stop taking Advil. His headaches were resolved but his back pain flared up. His back pain was so severe that he started taking Advil again resulting in headache relapse. In order to treat his back pain he underwent interventional procedures including MBB and RFA without significant relief. When offered a spinal cord stimulator he pursued a second opinion. Instead of having the SCS placed he decided to undergo trans  "foraminal injections. When done at one level worked about 30% but when given at 3 levels it worked very well. He ascribes a lot of his headaches to "sinus problems." He had 4 headaches last month of October, thus far he has had 4 headaches in November. This increased is attributed to recent sinus surgery. His past medical history is also noteworthy for bariatric surgery, a sleeve stomach reduction which has resulted in profound weight loss. He is overall pleased on his current regime which includes Zonisamide 200 mg daily, Zofran 8 mg prn, Migranal alternating with Relpax and limited doses of Fioricet for rescue. For prevention, he has also tried Gabapentin for 1 month, poorly tolerated. Botox, 3 rounds by Dr. Ky Jensen provided no relief.  He is not a candidate for Topamax because of risk of kidney stones. He cannot take Beta blockers because of history of asthma. He has taken Elavil in the past for reactive depression with no effect on his headaches.   Please see details of headache characteristics headache below.  Headache questionnaire     1. When did your Headaches start?    Age 9     2. Where are your headaches located?   Usually left temple      3. Your headache's characteristics:  Excruciating, Pressure, Throbbing, Pounding, Stabbing, Like a tight band        4. How long does the headache last?  Hours, days     5. How often does the headache occur?  Worst when sinus problems        6. Are your headaches preceded or accompanied by other symptoms? yes  If yes, please describe. Sometimes photophobia, occasional aura        7. Does the headache awaken you at night? no  If so, how often?            8. Please laura the word that best describes your headache's intensity:    moderate        9. Using a scale of 1 through 10, with 0 = no pain and 10 = the worst pain:  What score is your headache now? 0  What score is your headache at its worst? 9  What score is your headache at its best? 1         10. Possible " associated headache symptoms:  [x]  Sensitivity to light  [] Dizziness  [x] Nasal or sinus pressure/ pain   [x] Sensitivity to noise  [] Vertigo  [] Problems with concentration  [] Sensitivity to smells  [] Ringing in ears  [] Problems with memory    [] Blurred vision  [x] Irritability  [x] Problems with task completion    [] Double vision  [] Anger  [x]  Problems with relaxation  [x] Loss of appetite  [] Anxiety  [x] Neck tightness, Neck pain  [x] Nausea   [x] Nasal congestion  [] Vomiting           11. Headache improving factors:  [x] Sleep  [] Heat  [x] Darkness  [x] Ice  [x] Local pressure [] Menses (period)  [] Massage   [x] Medications:         12. Headache worsening factors:    [x] Fatigue [x] Sneezing  [x] Changes in Weather  [x] Light [] Bending Over [] Stress  [x] Noise [] Ovulation  [] Multiple Sclerosis Flare-Up  [] Smells  [] Menses  [] Food    [x] Coughing [] Alcohol        13. Number of caffeinated drinks per day: 0        14. Number of diet drinks per day: 0     Review of Systems   Constitutional:  Negative for appetite change, chills, fatigue and fever.   HENT: Negative for dental problem, ear pain, hearing loss, tinnitus, trouble swallowing and voice change.    Eyes: Negative for photophobia, pain and visual disturbance.   Respiratory: Negative for cough, chest tightness and shortness of breath.    Cardiovascular: Negative for chest pain, palpitations and leg swelling.   Gastrointestinal: Negative for abdominal pain, blood in stool, constipation, diarrhea, nausea and vomiting.   Endocrine: Negative for cold intolerance and heat intolerance.   Genitourinary: Negative for difficulty urinating, dysuria and urgency.   Musculoskeletal: Negative for arthralgias, back pain, gait problem, myalgias, neck pain and neck stiffness.   Skin: Negative for color change, pallor and rash.   Neurological: Positive for headaches. Negative for dizziness, tremors, seizures, syncope, facial asymmetry, speech difficulty,  weakness, light-headedness and numbness.   Hematological: Negative for adenopathy. Does not bruise/bleed easily.   Psychiatric/Behavioral: Negative for agitation, behavioral problems, confusion, decreased concentration, self-injury, sleep disturbance and suicidal ideas. The patient is not nervous/anxious.          Past Medical History   Diagnosis Date    Abnormal liver enzymes     Anemia     Arthritis     Asthma     Azotemia     Diverticulosis     Esophageal reflux     H/O Nasal MRSA      Hepatitis, unspecified     Hypotension     Infectious mononucleosis     Insomnia     Instability of knee joint     Left Axillary Furuncle 1/26/16     Migraine     Obesity, unspecified     ALPHONSO on CPAP     Other and unspecified hyperlipidemia     Other seborrheic keratosis     Other specified disorder of male genital organs(608.89)     Pulmonary nodule     Renal stone     Seasonal allergic rhinitis     Snoring     Spontaneous ecchymoses     Unspecified essential hypertension     Unspecified vitamin D deficiency     Variants of migraine, not elsewhere classified, without mention of intractable migraine without mention of status migrainosus      Past Surgical History   Procedure Laterality Date    Appendectomy      [place] tooth extraction      Inguinal hernia repair      Wrist fracture surgery Left     Gastric sleeve  11/2012    Radiofrequency ablation       lumbar back    Esophageal dilation       2016    Transformal injections       x 2     Family History   Problem Relation Age of Onset    Hypertension Mother     Stroke Mother     Cancer Father      throat    Alcohol abuse Father     Heart disease Father     Heart attacks under age 50 Father 49     Social History     Social History    Marital status:      Spouse name: N/A    Number of children: N/A    Years of education: N/A     Occupational History    Not on file.     Social History Main Topics    Smoking status: Never Smoker     Smokeless tobacco: Not on file    Alcohol use No    Drug use: No    Sexual activity: Not on file     Other Topics Concern    Not on file     Social History Narrative     Allergies   Allergen Reactions    Tetracyclines Rash       Current Outpatient Prescriptions   Medication Sig    butalbital-acetaminophen-caffeine -40 mg (FIORICET, ESGIC) -40 mg per tablet Take 1 tablet by mouth every 6 (six) hours as needed for Headaches. RX must last full 30 days.    dexlansoprazole (DEXILANT) 30 mg CpDM Take 60 mg by mouth once daily.     dihydroergotamine (MIGRANAL) 0.5 mg/pump act. (4 mg/mL) nasal spray Use in one nostril as directed.  No more than 4 sprays in one hour  Please provide the patient with 90 day supply    diphenoxylate-atropine 2.5-0.025 mg (LOMOTIL) 2.5-0.025 mg per tablet Take 1 tablet by mouth 4 (four) times daily as needed for Diarrhea.    eletriptan (RELPAX) 40 MG tablet Take 1 tablet by mouth, may repeat in 2 hours if necessary  Allow 24 tablets, a 90 day supply    eszopiclone 3 mg Tab Take 1 tablet by mouth every evening.    ferrous sulfate 325 mg (65 mg iron) Tab tablet Take 325 mg by mouth 2 (two) times daily.     hydrochlorothiazide (MICROZIDE) 12.5 mg capsule Take 1 capsule (12.5 mg total) by mouth once daily.    ibuprofen (ADVIL,MOTRIN) 800 MG tablet Take 800 mg by mouth.    irbesartan (AVAPRO) 150 MG tablet Take 1 tablet (150 mg total) by mouth once daily.    lidocaine-prilocaine (EMLA) cream     melatonin 10 mg Cap Take 1 capsule by mouth every evening.     METHYLCELLULOSE, WITH SUGAR, (CITRUCEL, SUCROSE, ORAL) Take 1 Dose by mouth every evening.    ondansetron (ZOFRAN) 4 MG tablet Take 4 mg by mouth.    ondansetron (ZOFRAN-ODT) 8 MG TbDL Take 1 tablet (8 mg total) by mouth every 6 (six) hours as needed.    oxycodone-acetaminophen (PERCOCET)  mg per tablet Take 1 tablet by mouth.    phenobarb-hyoscy-atropine-scop (DONNATAL) 16.2 mg-0.1037 mg/5 mL (5 mL) Elix  Take 15 mLs by mouth daily as needed.    polyethylene glycol (GLYCOLAX) 17 gram/dose powder Take 17 g by mouth once daily.     QNASL 80 mcg/actuation HFAA     riboflavin, vitamin B2, 400 mg Tab Take by mouth.    tizanidine (ZANAFLEX) 4 MG tablet TAKE 1 TABLET BY MOUTH THREE TIMES DAILY AS NEEDED    vitamin D 1000 units Tab Take 5,000 Units by mouth once daily.    vitamin E 100 UNIT capsule Take 400 Units by mouth 2 (two) times daily.     XOPENEX HFA 45 mcg/actuation inhaler     zonisamide (ZONEGRAN) 100 MG Cap Take 2 capsules (200 mg total) by mouth once daily.    diclofenac potassium (CAMBIA) 50 mg PwPk Take 1 Package by mouth daily as needed.     Current Facility-Administered Medications   Medication    sodium chloride 0.9% 100 mL flush bag    sodium chloride 0.9% flush 10 mL    sodium chloride 0.9% flush 10 mL         Objective:      Vitals:    09/07/17 1548   BP: (!) 145/92   Pulse: 85   Resp: 18   Temp: 98.1 °F (36.7 °C)     Body mass index is 30.32 kg/m².    Physical Exam    Constitutional:     He appears well-developed and well-nourished. He is well groomed  HENT:    Head: Atraumatic, oral and nasal mucosa intact  Eyes: Conjunctivae and EOM are normal. Pupils are equal, round, and reactive to light OU  Neck: Neck supple. No thyromegaly present  Cardiovascular: Normal rate and normal heart sounds  No murmur heard  Pulmonary/Chest: Effort normal and breath sounds normal  Skin: Skin is warm and dry  Psychiatric: Normal mood and affect     Neuro exam:  MINI MENTAL ESTATE EXAM 30/30  Mental status:  Awake, attentive, Alert, oriented to self, place, year and month  Language function is intact  Naming, repetition and spontaneous meaningful speech expression are intact  Speech fluency is good and speech is clear  Remote and recent memory are good  Patient able to count backwards by 7    No findings to suggest executive dysfuntion    Patient has adequate insight    Mood is stable    Cranial Nerves:  Smell  was not formally evaluated  Cranial Nerves II - XII: intact  Pursuits were smooth, normal saccades, no nystagmus OU  Motor - facial movement was symmetrical and normal     Palate moved well and was symmetrical with normal palatal and oral sensation  Tongue movements were full    Coordination:     Rapid alternating movements and rapid finger tapping - normal bilaterally  Finger to nose - normal and symmetric bilaterally     Motor:  Normal muscle bulk and symmetry. No fasciculations were noted    No pronator drift  Strength 5/5 bilaterally     Reflexes:  Tendon reflexes were 2 + at biceps, triceps, brachioradialis, patellar, and 1+ Achilles bilaterally    Gait: Normal gait.     Data review:  Lab Results   Component Value Date     03/03/2017    K 4.2 03/03/2017    MG 2.4 07/24/2012     03/03/2017    CO2 30 03/03/2017    BUN 24 (H) 03/03/2017    CREATININE 1.23 03/03/2017    GLU 94 03/03/2017    HGBA1C 5.8 07/24/2012    AST 25 03/03/2017    ALT 29 03/03/2017    ALBUMIN 4.3 03/03/2017    PROT 7.5 03/03/2017    BILITOT 0.5 03/03/2017    CHOL 189 03/03/2017    HDL 73 03/03/2017    LDLCALC 95.0 03/03/2017    TRIG 105 03/03/2017       Lab Results   Component Value Date    WBC 6.93 03/03/2017    HGB 15.2 03/03/2017    HCT 44.6 03/03/2017    MCV 93 03/03/2017     03/03/2017           Assessment and Plan     Migraine without Aura, doing fairly well on his current protocol. Continue with Zonisamide 200 mg daily and B2 400 mg daily for added prevention. For acute treatment continue with Zofran prn, and Relpax, alternating with Migranal. Can also use Cambia with limit of #9 per month, and rescue with Fioricet.   Medication Overuse Headache. Working on tapering off medication, he knows he weill be fine once he tapers off acute medications.  Status post RFA on the lumbar spine 3 weeks ago and on the left side today. Status post 2 solid months of physical therapy. As the lumbar pain improves and the need for  opioids is reduced his attacks will decrease. However, this may take a little while for full effect. Overall, review of the diaries reveals a gradual, yet noticeable reduction in the use of migraine medications  Status post 2 episodes of Transient Global Amnesia. Obtain PET scan if recurrence  Status post gastric sleeve, total weight loss 113 lbs over 4 years    RTC in 3 months with headache diary    Counseling:  More than 50% of the 25 minute encounter was spent face to face counseling the patient regarding current status and future plan of care as well as side of the medications. All questions were answered to patient's satisfaction      Dana Conn M.D  Medical Director, Headache and Facial Pain  [facility]

## 2017-09-27 ENCOUNTER — PATIENT MESSAGE (OUTPATIENT)
Dept: NEUROLOGY | Facility: CLINIC | Age: 62
End: 2017-09-27

## 2017-09-27 DIAGNOSIS — G43.009 MIGRAINE WITHOUT AURA, WITHOUT MENTION OF INTRACTABLE MIGRAINE WITHOUT MENTION OF STATUS MIGRAINOSUS: ICD-10-CM

## 2017-09-27 RX ORDER — BUTALBITAL, ACETAMINOPHEN AND CAFFEINE 50; 325; 40 MG/1; MG/1; MG/1
1 TABLET ORAL EVERY 6 HOURS PRN
Qty: 15 TABLET | Refills: 0 | Status: SHIPPED | OUTPATIENT
Start: 2017-09-27 | End: 2017-10-27

## 2017-10-05 RX ORDER — DIHYDROERGOTAMINE MESYLATE 4 MG/ML
SPRAY NASAL
Qty: 24 ML | Refills: 3 | Status: SHIPPED | OUTPATIENT
Start: 2017-10-05 | End: 2017-10-09 | Stop reason: SDUPTHER

## 2017-10-09 ENCOUNTER — PATIENT MESSAGE (OUTPATIENT)
Dept: NEUROLOGY | Facility: CLINIC | Age: 62
End: 2017-10-09

## 2017-10-10 RX ORDER — DIHYDROERGOTAMINE MESYLATE 4 MG/ML
SPRAY NASAL
Qty: 24 ML | Refills: 3 | Status: SHIPPED | OUTPATIENT
Start: 2017-10-10 | End: 2018-10-26 | Stop reason: SDUPTHER

## 2017-10-18 ENCOUNTER — PATIENT MESSAGE (OUTPATIENT)
Dept: NEUROLOGY | Facility: CLINIC | Age: 62
End: 2017-10-18

## 2017-10-18 NOTE — TELEPHONE ENCOUNTER
Called and spoke with pharmacist Ava. Rx Migranal called in. Dispense 24 mL with 3 refills. Patient is to use one nostril as directed. No more than 4 sprays in one hour. Ava verbalized understanding.

## 2017-10-21 ENCOUNTER — PATIENT MESSAGE (OUTPATIENT)
Dept: NEUROLOGY | Facility: CLINIC | Age: 62
End: 2017-10-21

## 2017-10-25 ENCOUNTER — PATIENT MESSAGE (OUTPATIENT)
Dept: NEUROLOGY | Facility: CLINIC | Age: 62
End: 2017-10-25

## 2017-10-26 ENCOUNTER — PATIENT MESSAGE (OUTPATIENT)
Dept: NEUROLOGY | Facility: CLINIC | Age: 62
End: 2017-10-26

## 2017-10-27 ENCOUNTER — PATIENT MESSAGE (OUTPATIENT)
Dept: NEUROLOGY | Facility: CLINIC | Age: 62
End: 2017-10-27

## 2017-10-30 ENCOUNTER — PATIENT MESSAGE (OUTPATIENT)
Dept: NEUROLOGY | Facility: CLINIC | Age: 62
End: 2017-10-30

## 2017-10-30 DIAGNOSIS — G43.711 CHRONIC MIGRAINE WITHOUT AURA, WITH INTRACTABLE MIGRAINE, SO STATED, WITH STATUS MIGRAINOSUS: Primary | ICD-10-CM

## 2017-10-31 ENCOUNTER — PATIENT MESSAGE (OUTPATIENT)
Dept: NEUROLOGY | Facility: CLINIC | Age: 62
End: 2017-10-31

## 2017-10-31 RX ORDER — BUTALBITAL, ACETAMINOPHEN AND CAFFEINE 50; 325; 40 MG/1; MG/1; MG/1
1 TABLET ORAL 3 TIMES DAILY PRN
Qty: 10 TABLET | Refills: 3 | Status: SHIPPED | OUTPATIENT
Start: 2017-10-31 | End: 2017-12-22 | Stop reason: SDUPTHER

## 2017-10-31 RX ORDER — BUTALBITAL, ACETAMINOPHEN, CAFFEINE AND CODEINE PHOSPHATE 50; 325; 40; 30 MG/1; MG/1; MG/1; MG/1
1 CAPSULE ORAL EVERY 4 HOURS
Qty: 15 EACH | Refills: 4 | Status: SHIPPED | OUTPATIENT
Start: 2017-10-31 | End: 2017-11-01

## 2017-11-03 ENCOUNTER — PATIENT MESSAGE (OUTPATIENT)
Dept: NEUROLOGY | Facility: CLINIC | Age: 62
End: 2017-11-03

## 2017-11-06 ENCOUNTER — PATIENT MESSAGE (OUTPATIENT)
Dept: NEUROLOGY | Facility: CLINIC | Age: 62
End: 2017-11-06

## 2017-11-07 RX ORDER — ONDANSETRON 8 MG/1
8 TABLET, ORALLY DISINTEGRATING ORAL EVERY 6 HOURS PRN
Qty: 30 TABLET | Refills: 3 | Status: SHIPPED | OUTPATIENT
Start: 2017-11-07 | End: 2018-06-05 | Stop reason: SDUPTHER

## 2017-12-14 ENCOUNTER — OFFICE VISIT (OUTPATIENT)
Dept: NEUROLOGY | Facility: CLINIC | Age: 62
End: 2017-12-14
Payer: COMMERCIAL

## 2017-12-14 VITALS
SYSTOLIC BLOOD PRESSURE: 169 MMHG | HEIGHT: 72 IN | DIASTOLIC BLOOD PRESSURE: 97 MMHG | RESPIRATION RATE: 16 BRPM | WEIGHT: 222.31 LBS | BODY MASS INDEX: 30.11 KG/M2 | HEART RATE: 65 BPM

## 2017-12-14 DIAGNOSIS — K21.9 GASTROESOPHAGEAL REFLUX DISEASE WITHOUT ESOPHAGITIS: ICD-10-CM

## 2017-12-14 DIAGNOSIS — N28.9 RENAL INSUFFICIENCY: ICD-10-CM

## 2017-12-14 DIAGNOSIS — G47.00 INSOMNIA, UNSPECIFIED TYPE: ICD-10-CM

## 2017-12-14 DIAGNOSIS — I10 ESSENTIAL HYPERTENSION: ICD-10-CM

## 2017-12-14 DIAGNOSIS — G43.711 CHRONIC MIGRAINE WITHOUT AURA, WITH INTRACTABLE MIGRAINE, SO STATED, WITH STATUS MIGRAINOSUS: Primary | ICD-10-CM

## 2017-12-14 PROCEDURE — 99214 OFFICE O/P EST MOD 30 MIN: CPT | Mod: S$GLB,,, | Performed by: PSYCHIATRY & NEUROLOGY

## 2017-12-14 PROCEDURE — 99999 PR PBB SHADOW E&M-EST. PATIENT-LVL III: CPT | Mod: PBBFAC,,, | Performed by: PSYCHIATRY & NEUROLOGY

## 2017-12-14 NOTE — PROGRESS NOTES
"Subjective:       Patient ID: Booker Back is a 61 y.o. male.    Chief Complaint: Headache  INTERVAL HISTORY  The patient comes for follow up. He is "a little better." He feels that Celebrex 200 mg daily taken for his back pain has helped his headaches somewhat. He manages escalations starting with Zofran, then either Relpax or Migranal and rescues with Fioricet. He averages 10 Fioricet per month. He is also on Percocet for his back pain.     HPI    The patient is a pleasant 62 y/o male with long time history of headaches. Over the years he has seen neurologists headache specialists and has received expert care. In addition to experiencing tension typt headache and migraine headache, he describes an episode of Ophthalmic Migraine, characterized by loss of vision lasting 2 to 3 hours, not followed by head pain which occurred 10 to 15 years prior to two episodes of Transient Global Amnesia. They happened in 2013 and they were about a month apart. He was admitted to the hospital where imaging was obtained as well as EEG all of which was reportedly negative. He was given the diagnosis of TGA, ex juvantivus. He states that it was after the episodes of TGA when he started having migraine headaches. About 12 years ago he was diagnosed with Medication overuse headache, he was taking Advil 400 mg twice daily. He saw Dr. Nettie Elena who advised him to stop taking Advil. His headaches were resolved but his back pain flared up. His back pain was so severe that he started taking Advil again resulting in headache relapse. In order to treat his back pain he underwent interventional procedures including MBB and RFA without significant relief. When offered a spinal cord stimulator he pursued a second opinion. Instead of having the SCS placed he decided to undergo trans foraminal injections. When done at one level worked about 30% but when given at 3 levels it worked very well. He ascribes a lot of his headaches to "sinus problems." " He had 4 headaches last month of October, thus far he has had 4 headaches in November. This increased is attributed to recent sinus surgery. His past medical history is also noteworthy for bariatric surgery, a sleeve stomach reduction which has resulted in profound weight loss. He is overall pleased on his current regime which includes Zonisamide 200 mg daily, Zofran 8 mg prn, Migranal alternating with Relpax and limited doses of Fioricet for rescue. For prevention, he has also tried Gabapentin for 1 month, poorly tolerated. Botox, 3 rounds by Dr. Ky Jensen provided no relief.  He is not a candidate for Topamax because of risk of kidney stones. He cannot take Beta blockers because of history of asthma. He has taken Elavil in the past for reactive depression with no effect on his headaches.   Please see details of headache characteristics headache below.  Headache questionnaire     1. When did your Headaches start?    Age 9     2. Where are your headaches located?   Usually left temple      3. Your headache's characteristics:  Excruciating, Pressure, Throbbing, Pounding, Stabbing, Like a tight band        4. How long does the headache last?  Hours, days     5. How often does the headache occur?  Worst when sinus problems        6. Are your headaches preceded or accompanied by other symptoms? yes  If yes, please describe. Sometimes photophobia, occasional aura        7. Does the headache awaken you at night? no  If so, how often?            8. Please laura the word that best describes your headache's intensity:    moderate        9. Using a scale of 1 through 10, with 0 = no pain and 10 = the worst pain:  What score is your headache now? 0  What score is your headache at its worst? 9  What score is your headache at its best? 1         10. Possible associated headache symptoms:  [x]  Sensitivity to light  [] Dizziness  [x] Nasal or sinus pressure/ pain   [x] Sensitivity to noise  [] Vertigo  [] Problems with  concentration  [] Sensitivity to smells  [] Ringing in ears  [] Problems with memory    [] Blurred vision  [x] Irritability  [x] Problems with task completion    [] Double vision  [] Anger  [x]  Problems with relaxation  [x] Loss of appetite  [] Anxiety  [x] Neck tightness, Neck pain  [x] Nausea   [x] Nasal congestion  [] Vomiting           11. Headache improving factors:  [x] Sleep  [] Heat  [x] Darkness  [x] Ice  [x] Local pressure [] Menses (period)  [] Massage   [x] Medications:         12. Headache worsening factors:    [x] Fatigue [x] Sneezing  [x] Changes in Weather  [x] Light [] Bending Over [] Stress  [x] Noise [] Ovulation  [] Multiple Sclerosis Flare-Up  [] Smells  [] Menses  [] Food    [x] Coughing [] Alcohol        13. Number of caffeinated drinks per day: 0        14. Number of diet drinks per day: 0     Review of Systems   Constitutional:  Negative for appetite change, chills, fatigue and fever.   HENT: Negative for dental problem, ear pain, hearing loss, tinnitus, trouble swallowing and voice change.    Eyes: Negative for photophobia, pain and visual disturbance.   Respiratory: Negative for cough, chest tightness and shortness of breath.    Cardiovascular: Negative for chest pain, palpitations and leg swelling.   Gastrointestinal: Negative for abdominal pain, blood in stool, constipation, diarrhea, nausea and vomiting.   Endocrine: Negative for cold intolerance and heat intolerance.   Genitourinary: Negative for difficulty urinating, dysuria and urgency.   Musculoskeletal: Negative for arthralgias, back pain, gait problem, myalgias, neck pain and neck stiffness.   Skin: Negative for color change, pallor and rash.   Neurological: Positive for headaches. Negative for dizziness, tremors, seizures, syncope, facial asymmetry, speech difficulty, weakness, light-headedness and numbness.   Hematological: Negative for adenopathy. Does not bruise/bleed easily.   Psychiatric/Behavioral: Negative for agitation,  behavioral problems, confusion, decreased concentration, self-injury, sleep disturbance and suicidal ideas. The patient is not nervous/anxious.          Past Medical History   Diagnosis Date    Abnormal liver enzymes     Anemia     Arthritis     Asthma     Azotemia     Diverticulosis     Esophageal reflux     H/O Nasal MRSA      Hepatitis, unspecified     Hypotension     Infectious mononucleosis     Insomnia     Instability of knee joint     Left Axillary Furuncle 1/26/16     Migraine     Obesity, unspecified     ALPHONSO on CPAP     Other and unspecified hyperlipidemia     Other seborrheic keratosis     Other specified disorder of male genital organs(608.89)     Pulmonary nodule     Renal stone     Seasonal allergic rhinitis     Snoring     Spontaneous ecchymoses     Unspecified essential hypertension     Unspecified vitamin D deficiency     Variants of migraine, not elsewhere classified, without mention of intractable migraine without mention of status migrainosus      Past Surgical History   Procedure Laterality Date    Appendectomy      [place] tooth extraction      Inguinal hernia repair      Wrist fracture surgery Left     Gastric sleeve  11/2012    Radiofrequency ablation       lumbar back    Esophageal dilation       2016    Transformal injections       x 2     Family History   Problem Relation Age of Onset    Hypertension Mother     Stroke Mother     Cancer Father      throat    Alcohol abuse Father     Heart disease Father     Heart attacks under age 50 Father 49     Social History     Social History    Marital status:      Spouse name: N/A    Number of children: N/A    Years of education: N/A     Occupational History    Not on file.     Social History Main Topics    Smoking status: Never Smoker    Smokeless tobacco: Not on file    Alcohol use No    Drug use: No    Sexual activity: Not on file     Other Topics Concern    Not on file     Social History  Narrative     Allergies   Allergen Reactions    Tetracyclines Rash       Current Outpatient Prescriptions   Medication Sig    butalbital-acetaminophen-caffeine -40 mg (FIORICET, ESGIC) -40 mg per tablet Take 1 tablet by mouth every 6 (six) hours as needed for Headaches. RX must last full 30 days.    dexlansoprazole (DEXILANT) 30 mg CpDM Take 60 mg by mouth once daily.     dihydroergotamine (MIGRANAL) 0.5 mg/pump act. (4 mg/mL) nasal spray Use in one nostril as directed.  No more than 4 sprays in one hour  Please provide the patient with 90 day supply    diphenoxylate-atropine 2.5-0.025 mg (LOMOTIL) 2.5-0.025 mg per tablet Take 1 tablet by mouth 4 (four) times daily as needed for Diarrhea.    eletriptan (RELPAX) 40 MG tablet Take 1 tablet by mouth, may repeat in 2 hours if necessary  Allow 24 tablets, a 90 day supply    eszopiclone 3 mg Tab Take 1 tablet by mouth every evening.    ferrous sulfate 325 mg (65 mg iron) Tab tablet Take 325 mg by mouth 2 (two) times daily.     hydrochlorothiazide (MICROZIDE) 12.5 mg capsule Take 1 capsule (12.5 mg total) by mouth once daily.    ibuprofen (ADVIL,MOTRIN) 800 MG tablet Take 800 mg by mouth.    irbesartan (AVAPRO) 150 MG tablet Take 1 tablet (150 mg total) by mouth once daily.    lidocaine-prilocaine (EMLA) cream     melatonin 10 mg Cap Take 1 capsule by mouth every evening.     METHYLCELLULOSE, WITH SUGAR, (CITRUCEL, SUCROSE, ORAL) Take 1 Dose by mouth every evening.    ondansetron (ZOFRAN) 4 MG tablet Take 4 mg by mouth.    ondansetron (ZOFRAN-ODT) 8 MG TbDL Take 1 tablet (8 mg total) by mouth every 6 (six) hours as needed.    oxycodone-acetaminophen (PERCOCET)  mg per tablet Take 1 tablet by mouth.    phenobarb-hyoscy-atropine-scop (DONNATAL) 16.2 mg-0.1037 mg/5 mL (5 mL) Elix Take 15 mLs by mouth daily as needed.    polyethylene glycol (GLYCOLAX) 17 gram/dose powder Take 17 g by mouth once daily.     QNASL 80 mcg/actuation HFAA      riboflavin, vitamin B2, 400 mg Tab Take by mouth.    tizanidine (ZANAFLEX) 4 MG tablet TAKE 1 TABLET BY MOUTH THREE TIMES DAILY AS NEEDED    vitamin D 1000 units Tab Take 5,000 Units by mouth once daily.    vitamin E 100 UNIT capsule Take 400 Units by mouth 2 (two) times daily.     XOPENEX HFA 45 mcg/actuation inhaler     zonisamide (ZONEGRAN) 100 MG Cap Take 2 capsules (200 mg total) by mouth once daily.    diclofenac potassium (CAMBIA) 50 mg PwPk Take 1 Package by mouth daily as needed.     Current Facility-Administered Medications   Medication    sodium chloride 0.9% 100 mL flush bag    sodium chloride 0.9% flush 10 mL    sodium chloride 0.9% flush 10 mL         Objective:      Vitals:    12/14/17 1518   BP: (!) 169/97   Pulse: 65   Resp: 16     Body mass index is 30.15 kg/m².    Physical Exam    Constitutional:     He appears well-developed and well-nourished. He is well groomed  HENT:    Head: Atraumatic, oral and nasal mucosa intact  Eyes: Conjunctivae and EOM are normal. Pupils are equal, round, and reactive to light OU  Neck: Neck supple. No thyromegaly present  Cardiovascular: Normal rate and normal heart sounds  No murmur heard  Pulmonary/Chest: Effort normal and breath sounds normal  Skin: Skin is warm and dry  Psychiatric: Normal mood and affect     Neuro exam:  MINI MENTAL ESTATE EXAM 30/30  Mental status:  Awake, attentive, Alert, oriented to self, place, year and month  Language function is intact  Naming, repetition and spontaneous meaningful speech expression are intact  Speech fluency is good and speech is clear  Remote and recent memory are good  Patient able to count backwards by 7    No findings to suggest executive dysfuntion    Patient has adequate insight    Mood is stable    Cranial Nerves:  Smell was not formally evaluated  Cranial Nerves II - XII: intact  Pursuits were smooth, normal saccades, no nystagmus OU  Motor - facial movement was symmetrical and normal     Palate moved  well and was symmetrical with normal palatal and oral sensation  Tongue movements were full    Coordination:     Rapid alternating movements and rapid finger tapping - normal bilaterally  Finger to nose - normal and symmetric bilaterally     Motor:  Normal muscle bulk and symmetry. No fasciculations were noted    No pronator drift  Strength 5/5 bilaterally     Reflexes:  Tendon reflexes were 2 + at biceps, triceps, brachioradialis, patellar, and 1+ Achilles bilaterally    Gait: Normal gait.     Data review:  Lab Results   Component Value Date     03/03/2017    K 4.2 03/03/2017    MG 2.4 07/24/2012     03/03/2017    CO2 30 03/03/2017    BUN 24 (H) 03/03/2017    CREATININE 1.23 03/03/2017    GLU 94 03/03/2017    HGBA1C 5.8 07/24/2012    AST 25 03/03/2017    ALT 29 03/03/2017    ALBUMIN 4.3 03/03/2017    PROT 7.5 03/03/2017    BILITOT 0.5 03/03/2017    CHOL 189 03/03/2017    HDL 73 03/03/2017    LDLCALC 95.0 03/03/2017    TRIG 105 03/03/2017       Lab Results   Component Value Date    WBC 6.93 03/03/2017    HGB 15.2 03/03/2017    HCT 44.6 03/03/2017    MCV 93 03/03/2017     03/03/2017           Assessment and Plan     Migraine without Aura, doing fairly well on his current protocol. Continue with Zonisamide 200 mg daily and B2 400 mg daily for added prevention. For acute treatment continue with Zofran prn, and Relpax, alternating with Migranal. Rescue with Fioricet (never exceeds 10 in 20 days and often uses less)  Medication Overuse Headache. Managing on a strict protocol  Status post RFA on the lumbar spine on the left side today. Status post physical therapy.   Status post 2 episodes of Transient Global Amnesia. Obtain PET scan if recurrence  Status post gastric sleeve, total weight loss 113 lbs over 4 years    RTC in 4 months with headache diary    Counseling:  More than 50% of the 25 minute encounter was spent face to face counseling the patient regarding current status and future plan of care  as well as side of the medications. All questions were answered to patient's satisfaction        Dana Conn M.D  Medical Director, Headache and Facial Pain  [facility]

## 2017-12-22 DIAGNOSIS — G43.711 CHRONIC MIGRAINE WITHOUT AURA, WITH INTRACTABLE MIGRAINE, SO STATED, WITH STATUS MIGRAINOSUS: ICD-10-CM

## 2017-12-22 RX ORDER — BUTALBITAL, ACETAMINOPHEN AND CAFFEINE 50; 325; 40 MG/1; MG/1; MG/1
1 TABLET ORAL 3 TIMES DAILY PRN
Qty: 10 TABLET | Refills: 3 | Status: SHIPPED | OUTPATIENT
Start: 2017-12-22 | End: 2018-02-18 | Stop reason: SDUPTHER

## 2018-02-18 DIAGNOSIS — G43.711 CHRONIC MIGRAINE WITHOUT AURA, WITH INTRACTABLE MIGRAINE, SO STATED, WITH STATUS MIGRAINOSUS: ICD-10-CM

## 2018-02-19 RX ORDER — BUTALBITAL, ACETAMINOPHEN AND CAFFEINE 50; 325; 40 MG/1; MG/1; MG/1
TABLET ORAL
Qty: 10 TABLET | Refills: 0 | Status: SHIPPED | OUTPATIENT
Start: 2018-02-19 | End: 2018-03-08 | Stop reason: SDUPTHER

## 2018-03-01 ENCOUNTER — PATIENT MESSAGE (OUTPATIENT)
Dept: NEUROLOGY | Facility: CLINIC | Age: 63
End: 2018-03-01

## 2018-03-07 ENCOUNTER — TELEPHONE (OUTPATIENT)
Dept: NEUROLOGY | Facility: CLINIC | Age: 63
End: 2018-03-07

## 2018-03-08 ENCOUNTER — PATIENT MESSAGE (OUTPATIENT)
Dept: NEUROLOGY | Facility: CLINIC | Age: 63
End: 2018-03-08

## 2018-03-08 DIAGNOSIS — G43.711 CHRONIC MIGRAINE WITHOUT AURA, WITH INTRACTABLE MIGRAINE, SO STATED, WITH STATUS MIGRAINOSUS: ICD-10-CM

## 2018-03-08 RX ORDER — BUTALBITAL, ACETAMINOPHEN AND CAFFEINE 50; 325; 40 MG/1; MG/1; MG/1
1 TABLET ORAL 3 TIMES DAILY PRN
Qty: 10 TABLET | Refills: 2 | Status: SHIPPED | OUTPATIENT
Start: 2018-03-08 | End: 2018-04-23 | Stop reason: SDUPTHER

## 2018-03-29 ENCOUNTER — OFFICE VISIT (OUTPATIENT)
Dept: URGENT CARE | Facility: CLINIC | Age: 63
End: 2018-03-29
Payer: COMMERCIAL

## 2018-03-29 VITALS
DIASTOLIC BLOOD PRESSURE: 93 MMHG | HEIGHT: 73 IN | WEIGHT: 210 LBS | TEMPERATURE: 98 F | BODY MASS INDEX: 27.83 KG/M2 | SYSTOLIC BLOOD PRESSURE: 157 MMHG | HEART RATE: 62 BPM | OXYGEN SATURATION: 99 % | RESPIRATION RATE: 16 BRPM

## 2018-03-29 DIAGNOSIS — S81.811A LACERATION OF RIGHT LOWER LEG, INITIAL ENCOUNTER: Primary | ICD-10-CM

## 2018-03-29 PROCEDURE — 3077F SYST BP >= 140 MM HG: CPT | Mod: CPTII,S$GLB,, | Performed by: INTERNAL MEDICINE

## 2018-03-29 PROCEDURE — 3080F DIAST BP >= 90 MM HG: CPT | Mod: CPTII,S$GLB,, | Performed by: INTERNAL MEDICINE

## 2018-03-29 PROCEDURE — 99213 OFFICE O/P EST LOW 20 MIN: CPT | Mod: S$GLB,,, | Performed by: INTERNAL MEDICINE

## 2018-03-29 RX ORDER — POLYETHYLENE GLYCOL 3350 17 G/17G
17 POWDER, FOR SOLUTION ORAL NIGHTLY
COMMUNITY

## 2018-03-29 RX ORDER — LANOLIN ALCOHOL/MO/W.PET/CERES
1 CREAM (GRAM) TOPICAL 2 TIMES DAILY
COMMUNITY
End: 2018-06-05

## 2018-03-29 NOTE — PROCEDURES
Laceration Repair  Date/Time: 3/29/2018 2:59 PM  Performed by: JOSE ALFREDO COMER  Authorized by: JOSE ALFREDO COMER   Body area: lower extremity  Location details: right lower leg  Laceration length: 3 cm  Foreign bodies: no foreign bodies  Vascular damage: no  Irrigation solution: saline  Amount of cleaning: standard  Debridement: none  Skin closure: Steri-Strips  Patient tolerance: Patient tolerated the procedure well with no immediate complications

## 2018-03-29 NOTE — PROGRESS NOTES
"Subjective:       Patient ID: Booker Back is a 62 y.o. male.    Vitals:  height is 6' 1" (1.854 m) and weight is 95.3 kg (210 lb). His oral temperature is 97.6 °F (36.4 °C). His blood pressure is 157/93 (abnormal) and his pulse is 62. His respiration is 16 and oxygen saturation is 99%.     Chief Complaint: Laceration    He injured his right lower leg when he hit it against the lower corner of his car door.  His pants did not tear.        Laceration    The incident occurred less than 1 hour ago. The laceration is located on the right leg. The laceration mechanism was a metal edge. The pain is moderate. The pain has been constant since onset. His tetanus status is UTD.     ROS    Objective:      Physical Exam   Constitutional: He is oriented to person, place, and time. He appears well-developed and well-nourished.   HENT:   Head: Normocephalic. Head is without abrasion, without contusion and without laceration.   Right Ear: External ear normal.   Left Ear: External ear normal.   Nose: Nose normal.   Mouth/Throat: Oropharynx is clear and moist.   Eyes: Conjunctivae, EOM and lids are normal. Pupils are equal, round, and reactive to light.   Neck: Trachea normal, full passive range of motion without pain and phonation normal. Neck supple.   Cardiovascular: Normal rate, regular rhythm and normal heart sounds.    Pulmonary/Chest: Effort normal and breath sounds normal. No respiratory distress.   Musculoskeletal: Normal range of motion.   Neurological: He is alert and oriented to person, place, and time.   Skin: Skin is warm, dry and intact. Capillary refill takes less than 2 seconds. No abrasion, no bruising, no burn, no ecchymosis, no laceration and no lesion noted.   3 cm semicurved flap superficial laceration at anterior mid right lower extremity.  Minimal bleeding.  No bruising   Psychiatric: He has a normal mood and affect. His speech is normal. Cognition and memory are normal.   Nursing note and vitals reviewed.   "    Assessment:       1. Laceration of right lower leg, initial encounter        Plan:         Laceration of right lower leg, initial encounter  -     LACERATION REPAIR

## 2018-03-29 NOTE — PATIENT INSTRUCTIONS
Wound Care  Taking proper care of your wound will help it heal. Your healthcare provider may show you how to clean and dress the wound. He or she will also explain how to tell if the wound is healing normally. If you are unsure of how to take care of the wound, be sure to clarify what dressing to use and how often you should change the bandages. Here are the basic steps.     A wound that's not healing normally may be dark in color or have white streaks.   Wash your hands  Tips for washing your hands include:  · Use liquid soap and lather for 2 minutes. Scrub between your fingers and under your nails.  · Rinse with warm water, keeping your fingers pointing down.  · Use a paper towel to dry your hands and to turn off the faucet.  Remove the used dressing  Here are suggestions for removing the dressing:  · If dressing changes cause you pain, be sure to take your pain medicine as prescribed by your healthcare provider 30 minutes before dressing changes.  · Set up your supplies.  · Put on disposable gloves if youre dressing a wound for someone else or your wound is infected.  · Loosen the tape by pulling gently toward the wound.  · Gently take off the old dressing. If the dressing is stuck to the wound, moisten it with saline (if available) or clean water.  · If you have a drain or tube in the wound, be careful not to pull on it.  · Remove the dressing 1 layer at a time and put it in a plastic bag.  · Remove your gloves.  Inspect and dress the wound  Check the wound carefully:  · Each time you change the dressing, inspect the wound carefully to be sure its healing normally by making sure your wound appears to be pink and moist, and is free of infection.    · Wash your hands again. Put on a new pair of gloves.  · Clean and dress the wound as directed by your healthcare provider or nurse. Do not put anything in the wound that is not prescribed or directed by your healthcare provider. If you have a drain or tube, be  careful not to pull on it. Make sure to secure the drain or tube as well.  · Put all supplies in a plastic bag. Seal the bag and put it in the trash.  · Be sure to wash your hands again.  Call your healthcare provider  Call your healthcare provider if you see any of the following signs of a problem:  · Bleeding that soaks the dressing  · Pink fluid weeping from the wound  · Increased drainage or drainage that is yellow, yellow-green, or foul-smelling  · Increased swelling or pain, or redness or swelling in the skin around the wound  · A change in the color of the wound, or if streaks develop in a direction away from the wound  · The area between any stitches opens up  · An increase in the size of the wound  · A fever of 100.4°F (38°C) or higher, or as directed by your healthcare provider  · Chills, increased fatigue, or a loss of appetite

## 2018-03-31 ENCOUNTER — TELEPHONE (OUTPATIENT)
Dept: URGENT CARE | Facility: CLINIC | Age: 63
End: 2018-03-31

## 2018-04-19 ENCOUNTER — OFFICE VISIT (OUTPATIENT)
Dept: NEUROLOGY | Facility: CLINIC | Age: 63
End: 2018-04-19
Payer: COMMERCIAL

## 2018-04-19 VITALS
DIASTOLIC BLOOD PRESSURE: 79 MMHG | HEIGHT: 73 IN | HEART RATE: 60 BPM | RESPIRATION RATE: 16 BRPM | SYSTOLIC BLOOD PRESSURE: 153 MMHG | BODY MASS INDEX: 28.04 KG/M2 | WEIGHT: 211.56 LBS

## 2018-04-19 DIAGNOSIS — K21.9 GASTROESOPHAGEAL REFLUX DISEASE WITHOUT ESOPHAGITIS: ICD-10-CM

## 2018-04-19 DIAGNOSIS — G47.00 INSOMNIA, UNSPECIFIED TYPE: ICD-10-CM

## 2018-04-19 DIAGNOSIS — N28.9 RENAL INSUFFICIENCY: ICD-10-CM

## 2018-04-19 DIAGNOSIS — G43.711 CHRONIC MIGRAINE WITHOUT AURA, WITH INTRACTABLE MIGRAINE, SO STATED, WITH STATUS MIGRAINOSUS: Primary | ICD-10-CM

## 2018-04-19 DIAGNOSIS — I10 ESSENTIAL HYPERTENSION: ICD-10-CM

## 2018-04-19 PROCEDURE — 3078F DIAST BP <80 MM HG: CPT | Mod: CPTII,S$GLB,, | Performed by: PSYCHIATRY & NEUROLOGY

## 2018-04-19 PROCEDURE — 3077F SYST BP >= 140 MM HG: CPT | Mod: CPTII,S$GLB,, | Performed by: PSYCHIATRY & NEUROLOGY

## 2018-04-19 PROCEDURE — 99214 OFFICE O/P EST MOD 30 MIN: CPT | Mod: S$GLB,,, | Performed by: PSYCHIATRY & NEUROLOGY

## 2018-04-19 PROCEDURE — 99999 PR PBB SHADOW E&M-EST. PATIENT-LVL V: CPT | Mod: PBBFAC,,, | Performed by: PSYCHIATRY & NEUROLOGY

## 2018-04-19 NOTE — PROGRESS NOTES
Subjective:       Patient ID: Booker Back is a 61 y.o. male.    Chief Complaint: Headache  INTERVAL HISTORY  The patient comes for follow up. He is status quo. He stopped Celebrex 200 mg daily taken for his back pain had helped his headaches and is now on Advil for his left shoulder pain.He manages escalations starting with Zofran, then either Relpax or Migranal and rescues with Fioricet. He averages 10 Fioricet per month. He is also on Percocet for his back pain. He ends up taking daily medication of one kind or another    HPI    The patient is a pleasant 60 y/o male with long time history of headaches. Over the years he has seen neurologists headache specialists and has received expert care. In addition to experiencing tension typt headache and migraine headache, he describes an episode of Ophthalmic Migraine, characterized by loss of vision lasting 2 to 3 hours, not followed by head pain which occurred 10 to 15 years prior to two episodes of Transient [employer] Amnesia. They happened in 2013 and they were about a month apart. He was admitted to the hospital where imaging was obtained as well as EEG all of which was reportedly negative. He was given the diagnosis of TGA, ex juvantivus. He states that it was after the episodes of TGA when he started having migraine headaches. About 12 years ago he was diagnosed with Medication overuse headache, he was taking Advil 400 mg twice daily. He saw Dr. Nettie Elena who advised him to stop taking Advil. His headaches were resolved but his back pain flared up. His back pain was so severe that he started taking Advil again resulting in headache relapse. In order to treat his back pain he underwent interventional procedures including MBB and RFA without significant relief. When offered a spinal cord stimulator he pursued a second opinion. Instead of having the SCS placed he decided to undergo trans foraminal injections. When done at one level worked about 30% but when given at 3  "levels it worked very well. He ascribes a lot of his headaches to "sinus problems." He had 4 headaches last month of October, thus far he has had 4 headaches in November. This increased is attributed to recent sinus surgery. His past medical history is also noteworthy for bariatric surgery, a sleeve stomach reduction which has resulted in profound weight loss. He is overall pleased on his current regime which includes Zonisamide 200 mg daily, Zofran 8 mg prn, Migranal alternating with Relpax and limited doses of Fioricet for rescue. For prevention, he has also tried Gabapentin for 1 month, poorly tolerated. Botox, 3 rounds by Dr. Ky Jensen provided no relief.  He is not a candidate for Topamax because of risk of kidney stones. He cannot take Beta blockers because of history of asthma. He has taken Elavil in the past for reactive depression with no effect on his headaches.   Please see details of headache characteristics headache below.  Headache questionnaire     1. When did your Headaches start?    Age 9     2. Where are your headaches located?   Usually left temple      3. Your headache's characteristics:  Excruciating, Pressure, Throbbing, Pounding, Stabbing, Like a tight band        4. How long does the headache last?  Hours, days     5. How often does the headache occur?  Worst when sinus problems        6. Are your headaches preceded or accompanied by other symptoms? yes  If yes, please describe. Sometimes photophobia, occasional aura        7. Does the headache awaken you at night? no  If so, how often?            8. Please laura the word that best describes your headache's intensity:    moderate        9. Using a scale of 1 through 10, with 0 = no pain and 10 = the worst pain:  What score is your headache now? 0  What score is your headache at its worst? 9  What score is your headache at its best? 1         10. Possible associated headache symptoms:  [x]  Sensitivity to light  [] Dizziness  [x] Nasal " or sinus pressure/ pain   [x] Sensitivity to noise  [] Vertigo  [] Problems with concentration  [] Sensitivity to smells  [] Ringing in ears  [] Problems with memory    [] Blurred vision  [x] Irritability  [x] Problems with task completion    [] Double vision  [] Anger  [x]  Problems with relaxation  [x] Loss of appetite  [] Anxiety  [x] Neck tightness, Neck pain  [x] Nausea   [x] Nasal congestion  [] Vomiting           11. Headache improving factors:  [x] Sleep  [] Heat  [x] Darkness  [x] Ice  [x] Local pressure [] Menses (period)  [] Massage   [x] Medications:         12. Headache worsening factors:    [x] Fatigue [x] Sneezing  [x] Changes in Weather  [x] Light [] Bending Over [] Stress  [x] Noise [] Ovulation  [] Multiple Sclerosis Flare-Up  [] Smells  [] Menses  [] Food    [x] Coughing [] Alcohol        13. Number of caffeinated drinks per day: 0        14. Number of diet drinks per day: 0     Review of Systems   Constitutional:  Negative for appetite change, chills, fatigue and fever.   HENT: Negative for dental problem, ear pain, hearing loss, tinnitus, trouble swallowing and voice change.    Eyes: Negative for photophobia, pain and visual disturbance.   Respiratory: Negative for cough, chest tightness and shortness of breath.    Cardiovascular: Negative for chest pain, palpitations and leg swelling.   Gastrointestinal: Negative for abdominal pain, blood in stool, constipation, diarrhea, nausea and vomiting.   Endocrine: Negative for cold intolerance and heat intolerance.   Genitourinary: Negative for difficulty urinating, dysuria and urgency.   Musculoskeletal: Negative for arthralgias, back pain, gait problem, myalgias, neck pain and neck stiffness.   Skin: Negative for color change, pallor and rash.   Neurological: Positive for headaches. Negative for dizziness, tremors, seizures, syncope, facial asymmetry, speech difficulty, weakness, light-headedness and numbness.   Hematological: Negative for adenopathy.  Does not bruise/bleed easily.   Psychiatric/Behavioral: Negative for agitation, behavioral problems, confusion, decreased concentration, self-injury, sleep disturbance and suicidal ideas. The patient is not nervous/anxious.          Past Medical History   Diagnosis Date    Abnormal liver enzymes     Anemia     Arthritis     Asthma     Azotemia     Diverticulosis     Esophageal reflux     H/O Nasal MRSA      Hepatitis, unspecified     Hypotension     Infectious mononucleosis     Insomnia     Instability of knee joint     Left Axillary Furuncle 1/26/16     Migraine     Obesity, unspecified     ALPHONSO on CPAP     Other and unspecified hyperlipidemia     Other seborrheic keratosis     Other specified disorder of male genital organs(608.89)     Pulmonary nodule     Renal stone     Seasonal allergic rhinitis     Snoring     Spontaneous ecchymoses     Unspecified essential hypertension     Unspecified vitamin D deficiency     Variants of migraine, not elsewhere classified, without mention of intractable migraine without mention of status migrainosus      Past Surgical History   Procedure Laterality Date    Appendectomy      [place] tooth extraction      Inguinal hernia repair      Wrist fracture surgery Left     Gastric sleeve  11/2012    Radiofrequency ablation       lumbar back    Esophageal dilation       2016    Transformal injections       x 2     Family History   Problem Relation Age of Onset    Hypertension Mother     Stroke Mother     Cancer Father      throat    Alcohol abuse Father     Heart disease Father     Heart attacks under age 50 Father 49     Social History     Social History    Marital status:      Spouse name: N/A    Number of children: N/A    Years of education: N/A     Occupational History    Not on file.     Social History Main Topics    Smoking status: Never Smoker    Smokeless tobacco: Not on file    Alcohol use No    Drug use: No    Sexual  activity: Not on file     Other Topics Concern    Not on file     Social History Narrative     Allergies   Allergen Reactions    Tetracyclines Rash       Current Outpatient Prescriptions   Medication Sig    butalbital-acetaminophen-caffeine -40 mg (FIORICET, ESGIC) -40 mg per tablet Take 1 tablet by mouth every 6 (six) hours as needed for Headaches. RX must last full 30 days.    dexlansoprazole (DEXILANT) 30 mg CpDM Take 60 mg by mouth once daily.     dihydroergotamine (MIGRANAL) 0.5 mg/pump act. (4 mg/mL) nasal spray Use in one nostril as directed.  No more than 4 sprays in one hour  Please provide the patient with 90 day supply    diphenoxylate-atropine 2.5-0.025 mg (LOMOTIL) 2.5-0.025 mg per tablet Take 1 tablet by mouth 4 (four) times daily as needed for Diarrhea.    eletriptan (RELPAX) 40 MG tablet Take 1 tablet by mouth, may repeat in 2 hours if necessary  Allow 24 tablets, a 90 day supply    eszopiclone 3 mg Tab Take 1 tablet by mouth every evening.    ferrous sulfate 325 mg (65 mg iron) Tab tablet Take 325 mg by mouth 2 (two) times daily.     hydrochlorothiazide (MICROZIDE) 12.5 mg capsule Take 1 capsule (12.5 mg total) by mouth once daily.    ibuprofen (ADVIL,MOTRIN) 800 MG tablet Take 800 mg by mouth.    irbesartan (AVAPRO) 150 MG tablet Take 1 tablet (150 mg total) by mouth once daily.    lidocaine-prilocaine (EMLA) cream     melatonin 10 mg Cap Take 1 capsule by mouth every evening.     METHYLCELLULOSE, WITH SUGAR, (CITRUCEL, SUCROSE, ORAL) Take 1 Dose by mouth every evening.    ondansetron (ZOFRAN) 4 MG tablet Take 4 mg by mouth.    ondansetron (ZOFRAN-ODT) 8 MG TbDL Take 1 tablet (8 mg total) by mouth every 6 (six) hours as needed.    oxycodone-acetaminophen (PERCOCET)  mg per tablet Take 1 tablet by mouth.    phenobarb-hyoscy-atropine-scop (DONNATAL) 16.2 mg-0.1037 mg/5 mL (5 mL) Elix Take 15 mLs by mouth daily as needed.    polyethylene glycol (GLYCOLAX) 17  gram/dose powder Take 17 g by mouth once daily.     QNASL 80 mcg/actuation HFAA     riboflavin, vitamin B2, 400 mg Tab Take by mouth.    tizanidine (ZANAFLEX) 4 MG tablet TAKE 1 TABLET BY MOUTH THREE TIMES DAILY AS NEEDED    vitamin D 1000 units Tab Take 5,000 Units by mouth once daily.    vitamin E 100 UNIT capsule Take 400 Units by mouth 2 (two) times daily.     XOPENEX HFA 45 mcg/actuation inhaler     zonisamide (ZONEGRAN) 100 MG Cap Take 2 capsules (200 mg total) by mouth once daily.    diclofenac potassium (CAMBIA) 50 mg PwPk Take 1 Package by mouth daily as needed.     Current Facility-Administered Medications   Medication    sodium chloride 0.9% 100 mL flush bag    sodium chloride 0.9% flush 10 mL    sodium chloride 0.9% flush 10 mL         Objective:      Vitals:    04/19/18 1523   BP: (!) 153/79   Pulse: 60   Resp: 16     Body mass index is 27.91 kg/m².    Physical Exam    Constitutional:     He appears well-developed and well-nourished. He is well groomed  HENT:    Head: Atraumatic, oral and nasal mucosa intact  Eyes: Conjunctivae and EOM are normal. Pupils are equal, round, and reactive to light OU  Neck: Neck supple. No thyromegaly present  Cardiovascular: Normal rate and normal heart sounds  No murmur heard  Pulmonary/Chest: Effort normal and breath sounds normal  Skin: Skin is warm and dry  Psychiatric: Normal mood and affect     Neuro exam:  MINI MENTAL ESTATE EXAM 30/30  Mental status:  Awake, attentive, Alert, oriented to self, place, year and month  Language function is intact  Naming, repetition and spontaneous meaningful speech expression are intact  Speech fluency is good and speech is clear  Remote and recent memory are good  Patient able to count backwards by 7    No findings to suggest executive dysfuntion    Patient has adequate insight    Mood is stable    Cranial Nerves:  Smell was not formally evaluated  Cranial Nerves II - XII: intact  Pursuits were smooth, normal saccades, no  nystagmus OU  Motor - facial movement was symmetrical and normal     Palate moved well and was symmetrical with normal palatal and oral sensation  Tongue movements were full    Coordination:     Rapid alternating movements and rapid finger tapping - normal bilaterally  Finger to nose - normal and symmetric bilaterally     Motor:  Normal muscle bulk and symmetry. No fasciculations were noted    No pronator drift  Strength 5/5 bilaterally     Reflexes:  Tendon reflexes were 2 + at biceps, triceps, brachioradialis, patellar, and 1+ Achilles bilaterally    Gait: Normal gait.     Data review:  Lab Results   Component Value Date     03/03/2017    K 4.2 03/03/2017    MG 2.4 07/24/2012     03/03/2017    CO2 30 03/03/2017    BUN 24 (H) 03/03/2017    CREATININE 1.23 03/03/2017    GLU 94 03/03/2017    HGBA1C 5.8 07/24/2012    AST 25 03/03/2017    ALT 29 03/03/2017    ALBUMIN 4.3 03/03/2017    PROT 7.5 03/03/2017    BILITOT 0.5 03/03/2017    CHOL 189 03/03/2017    HDL 73 03/03/2017    LDLCALC 95.0 03/03/2017    TRIG 105 03/03/2017       Lab Results   Component Value Date    WBC 6.93 03/03/2017    HGB 15.2 03/03/2017    HCT 44.6 03/03/2017    MCV 93 03/03/2017     03/03/2017           Assessment and Plan     Migraine without Aura, chronic. Continue with Zonisamide 200 mg daily and B2 400 mg daily for added prevention. For acute treatment continue with Zofran prn, and Relpax, alternating with Migranal. Rescue with Fioricet (never exceeds 10 in 20 days and often uses less)  Medication Overuse Headache. Thinking of tapering off all meds. I have given him guidelines regarding Migranal bridge protocol  RTC in 4 months with headache diary    Counseling:  More than 50% of the 25 minute encounter was spent face to face counseling the patient regarding current status and future plan of care as well as side of the medications. All questions were answered to patient's satisfaction        Dana Conn M.D  Medical  Director, Headache and Facial Pain  [facility]

## 2018-04-22 ENCOUNTER — PATIENT MESSAGE (OUTPATIENT)
Dept: NEUROLOGY | Facility: CLINIC | Age: 63
End: 2018-04-22

## 2018-04-23 ENCOUNTER — TELEPHONE (OUTPATIENT)
Dept: NEUROLOGY | Facility: CLINIC | Age: 63
End: 2018-04-23

## 2018-04-23 DIAGNOSIS — G43.711 CHRONIC MIGRAINE WITHOUT AURA, WITH INTRACTABLE MIGRAINE, SO STATED, WITH STATUS MIGRAINOSUS: ICD-10-CM

## 2018-04-23 RX ORDER — BUTALBITAL, ACETAMINOPHEN AND CAFFEINE 50; 325; 40 MG/1; MG/1; MG/1
1 TABLET ORAL 3 TIMES DAILY PRN
Qty: 10 TABLET | Refills: 2 | Status: SHIPPED | OUTPATIENT
Start: 2018-04-23 | End: 2018-09-17 | Stop reason: ALTCHOICE

## 2018-04-23 NOTE — TELEPHONE ENCOUNTER
Called pt and scheduled follow up due to scheduling error last week. Appointment successfully scheduled. Pt verbalized understanding.

## 2018-05-02 ENCOUNTER — PATIENT MESSAGE (OUTPATIENT)
Dept: NEUROLOGY | Facility: CLINIC | Age: 63
End: 2018-05-02

## 2018-05-22 ENCOUNTER — PATIENT MESSAGE (OUTPATIENT)
Dept: NEUROLOGY | Facility: CLINIC | Age: 63
End: 2018-05-22

## 2018-05-23 ENCOUNTER — TELEPHONE (OUTPATIENT)
Dept: NEUROLOGY | Facility: CLINIC | Age: 63
End: 2018-05-23

## 2018-05-23 NOTE — TELEPHONE ENCOUNTER
Called pt and informed him that the new medication that he wants to try the form requires his signature. Pt stated he will come in tomorrow morning to sign form.

## 2018-05-29 ENCOUNTER — TELEPHONE (OUTPATIENT)
Dept: NEUROLOGY | Facility: CLINIC | Age: 63
End: 2018-05-29

## 2018-05-31 ENCOUNTER — TELEPHONE (OUTPATIENT)
Dept: NEUROLOGY | Facility: CLINIC | Age: 63
End: 2018-05-31

## 2018-06-12 ENCOUNTER — TELEPHONE (OUTPATIENT)
Dept: NEUROLOGY | Facility: CLINIC | Age: 63
End: 2018-06-12

## 2018-06-12 NOTE — TELEPHONE ENCOUNTER
----- Message from Effie Martinez sent at 6/12/2018 11:54 AM CDT -----  Type: Needs Medical Advice    Who Called:  [facility]/Fabien Agudelo Call Back Number: [phone] ext [phone]  Additional Information: The dispensing and the dosing are inconsistent for the [facility]. Please call or fax the correct dosage to [phone]

## 2018-06-22 ENCOUNTER — TELEPHONE (OUTPATIENT)
Dept: NEUROLOGY | Facility: CLINIC | Age: 63
End: 2018-06-22

## 2018-06-22 NOTE — TELEPHONE ENCOUNTER
Called and spoke with [facility] in regards to patient being covered for medication. Information and fax number given to call insurance in regards to coverage for medication. All forms and documentation collected, forms faxed over to insurance company. Awaiting confirmation.

## 2018-06-22 NOTE — TELEPHONE ENCOUNTER
----- Message from Michael Pedersen sent at 6/22/2018 11:03 AM CDT -----  Type: Needs Medical Advice    Who Called:  Dillon Aguiar Symptoms (please be specific):    How long has patient had these symptoms:    Pharmacy name and phone #:    [facility] Call Back Number: [phone]  Additional Information: the office called asking if non formulary was submitted to patient's insurance.

## 2018-06-25 ENCOUNTER — PATIENT MESSAGE (OUTPATIENT)
Dept: NEUROLOGY | Facility: CLINIC | Age: 63
End: 2018-06-25

## 2018-06-27 ENCOUNTER — TELEPHONE (OUTPATIENT)
Dept: NEUROLOGY | Facility: CLINIC | Age: 63
End: 2018-06-27

## 2018-06-27 NOTE — TELEPHONE ENCOUNTER
----- Message from Micaela Agustin sent at 6/27/2018 10:10 AM CDT -----  Type:  Pharmacy Calling to Clarify an RX    Name of Caller:  [facility]  Pharmacy Name: [facility]  Prescription Name:non   form alary  ecpection [facility]  What do they need to clarify?:  yaneli  [facility] Call Back Number: [phone] ext [phone]  Additional Information:    Please call for  Details

## 2018-06-27 NOTE — TELEPHONE ENCOUNTER
Returned speciality pharmacy call in regards to Non Formulary form for patient. Prior authorization at patients insurance was called on Monday 6/25/2018. Insurance stated patient will receive decision letter 42-72 hours.

## 2018-07-03 ENCOUNTER — TELEPHONE (OUTPATIENT)
Dept: NEUROLOGY | Facility: CLINIC | Age: 63
End: 2018-07-03

## 2018-07-03 NOTE — TELEPHONE ENCOUNTER
----- Message from Micaela Agustin sent at 7/2/2018  4:51 PM CDT -----   Type:  Pharmacy Calling to Clarify an RX    Name of Caller:  rosendo  Pharmacy Name:  [facility] prescription Name: non    fomulary What do they need to clarify?:    [facility] Call Back Number:  [phone] ext [phone] Information: na

## 2018-07-03 NOTE — TELEPHONE ENCOUNTER
Called speciality pharmacy in regards to  Patient and shipment. Pharmacy stated they do not know why our office continues to keep getting calls. Patient has already received first free trial shipment.

## 2018-07-11 ENCOUNTER — TELEPHONE (OUTPATIENT)
Dept: NEUROLOGY | Facility: CLINIC | Age: 63
End: 2018-07-11

## 2018-07-11 NOTE — TELEPHONE ENCOUNTER
----- Message from Dania Chaney sent at 7/11/2018  2:38 PM CDT -----  Contact: Kayli  Type: Needs Medical Advice    Who Called:  Kayli with [facility]  Symptoms (please be specific):    How long has patient had these symptoms:    Pharmacy name and phone #:    [facility] Call Back Number: [phone]  Additional Information: called regarding prior authorizations for non formulary exceptions. Requesting a call back form Aaron

## 2018-07-12 ENCOUNTER — PATIENT MESSAGE (OUTPATIENT)
Dept: NEUROLOGY | Facility: CLINIC | Age: 63
End: 2018-07-12

## 2018-07-12 DIAGNOSIS — G43.711 CHRONIC MIGRAINE WITHOUT AURA, WITH INTRACTABLE MIGRAINE, SO STATED, WITH STATUS MIGRAINOSUS: Primary | ICD-10-CM

## 2018-07-12 RX ORDER — ONDANSETRON 8 MG/1
8 TABLET, ORALLY DISINTEGRATING ORAL EVERY 6 HOURS PRN
Qty: 60 TABLET | Refills: 3 | Status: SHIPPED | OUTPATIENT
Start: 2018-07-12 | End: 2019-09-16 | Stop reason: SDUPTHER

## 2018-07-25 ENCOUNTER — PATIENT MESSAGE (OUTPATIENT)
Dept: NEUROLOGY | Facility: CLINIC | Age: 63
End: 2018-07-25

## 2018-08-02 ENCOUNTER — PATIENT MESSAGE (OUTPATIENT)
Dept: NEUROLOGY | Facility: CLINIC | Age: 63
End: 2018-08-02

## 2018-08-02 ENCOUNTER — TELEPHONE (OUTPATIENT)
Dept: NEUROLOGY | Facility: CLINIC | Age: 63
End: 2018-08-02

## 2018-08-02 RX ORDER — PROMETHAZINE HYDROCHLORIDE 25 MG/1
25 TABLET ORAL 2 TIMES DAILY PRN
Qty: 12 TABLET | Refills: 3 | Status: SHIPPED | OUTPATIENT
Start: 2018-08-02 | End: 2019-08-19

## 2018-08-23 ENCOUNTER — OFFICE VISIT (OUTPATIENT)
Dept: NEUROLOGY | Facility: CLINIC | Age: 63
End: 2018-08-23
Payer: COMMERCIAL

## 2018-08-23 VITALS
BODY MASS INDEX: 28.92 KG/M2 | WEIGHT: 213.5 LBS | HEIGHT: 72 IN | SYSTOLIC BLOOD PRESSURE: 157 MMHG | HEART RATE: 61 BPM | RESPIRATION RATE: 16 BRPM | DIASTOLIC BLOOD PRESSURE: 86 MMHG

## 2018-08-23 DIAGNOSIS — G47.00 INSOMNIA, UNSPECIFIED TYPE: ICD-10-CM

## 2018-08-23 DIAGNOSIS — G43.711 CHRONIC MIGRAINE WITHOUT AURA, WITH INTRACTABLE MIGRAINE, SO STATED, WITH STATUS MIGRAINOSUS: Primary | ICD-10-CM

## 2018-08-23 DIAGNOSIS — N28.9 RENAL INSUFFICIENCY: ICD-10-CM

## 2018-08-23 DIAGNOSIS — I10 ESSENTIAL HYPERTENSION: ICD-10-CM

## 2018-08-23 PROCEDURE — 3008F BODY MASS INDEX DOCD: CPT | Mod: CPTII,S$GLB,, | Performed by: PSYCHIATRY & NEUROLOGY

## 2018-08-23 PROCEDURE — 3077F SYST BP >= 140 MM HG: CPT | Mod: CPTII,S$GLB,, | Performed by: PSYCHIATRY & NEUROLOGY

## 2018-08-23 PROCEDURE — 99999 PR PBB SHADOW E&M-EST. PATIENT-LVL III: CPT | Mod: PBBFAC,,, | Performed by: PSYCHIATRY & NEUROLOGY

## 2018-08-23 PROCEDURE — 99214 OFFICE O/P EST MOD 30 MIN: CPT | Mod: S$GLB,,, | Performed by: PSYCHIATRY & NEUROLOGY

## 2018-08-23 PROCEDURE — 3079F DIAST BP 80-89 MM HG: CPT | Mod: CPTII,S$GLB,, | Performed by: PSYCHIATRY & NEUROLOGY

## 2018-08-23 NOTE — PROGRESS NOTES
Subjective:       Patient ID: Booker Back is a 62 y.o. male.    Chief Complaint: Headache  INTERVAL HISTORY  The patient comes for follow up. He is significantly better. After taking Aimovig 70 mg, he reports about 60% overall improvement. He has noted some improvement with Gammacore but the headache tends to recur. He manages escalations starting with Zofran, then either Relpax or Migranal and rescues with Fioricet. He averages 10 Fioricet per month. He is also on Percocet for his back pain. He has not taken Percocet for 4 days in a row, 2 out of those days he had a headache. Last July, he had aphakia OS, ever since he has intermittent conjunctival redness. He was in a  tour this summer and did quite well overall.    HPI    The patient is a pleasant 62 y/o male with long time history of headaches. Over the years he has seen neurologists headache specialists and has received expert care. In addition to experiencing tension typt headache and migraine headache, he describes an episode of Ophthalmic Migraine, characterized by loss of vision lasting 2 to 3 hours, not followed by head pain which occurred 10 to 15 years prior to two episodes of Transient Global Amnesia. They happened in 2013 and they were about a month apart. He was admitted to the hospital where imaging was obtained as well as EEG all of which was reportedly negative. He was given the diagnosis of TGA, ex juvantivus. He states that it was after the episodes of TGA when he started having migraine headaches. About 12 years ago he was diagnosed with Medication overuse headache, he was taking Advil 400 mg twice daily. He saw Dr. Nettie Elena who advised him to stop taking Advil. His headaches were resolved but his back pain flared up. His back pain was so severe that he started taking Advil again resulting in headache relapse. In order to treat his back pain he underwent interventional procedures including MBB and RFA without significant relief. When  "offered a spinal cord stimulator he pursued a second opinion. Instead of having the SCS placed he decided to undergo trans foraminal injections. When done at one level worked about 30% but when given at 3 levels it worked very well. He ascribes a lot of his headaches to "sinus problems." He had 4 headaches last month of October, thus far he has had 4 headaches in November. This increased is attributed to recent sinus surgery. His past medical history is also noteworthy for bariatric surgery, a sleeve stomach reduction which has resulted in profound weight loss. He is overall pleased on his current regime which includes Zonisamide 200 mg daily, Zofran 8 mg prn, Migranal alternating with Relpax and limited doses of Fioricet for rescue. For prevention, he has also tried Gabapentin for 1 month, poorly tolerated. Botox, 3 rounds by Dr. Ky Jensen provided no relief.  He is not a candidate for Topamax because of risk of kidney stones. He cannot take Beta blockers because of history of asthma. He has taken Elavil in the past for reactive depression with no effect on his headaches.   Please see details of headache characteristics headache below.  Headache questionnaire     1. When did your Headaches start?    Age 9     2. Where are your headaches located?   Usually left temple      3. Your headache's characteristics:  Excruciating, Pressure, Throbbing, Pounding, Stabbing, Like a tight band        4. How long does the headache last?  Hours, days     5. How often does the headache occur?  Worst when sinus problems        6. Are your headaches preceded or accompanied by other symptoms? yes  If yes, please describe. Sometimes photophobia, occasional aura        7. Does the headache awaken you at night? no  If so, how often?            8. Please laura the word that best describes your headache's intensity:    moderate        9. Using a scale of 1 through 10, with 0 = no pain and 10 = the worst pain:  What score is your " headache now? 0  What score is your headache at its worst? 9  What score is your headache at its best? 1         10. Possible associated headache symptoms:  [x]  Sensitivity to light  [] Dizziness  [x] Nasal or sinus pressure/ pain   [x] Sensitivity to noise  [] Vertigo  [] Problems with concentration  [] Sensitivity to smells  [] Ringing in ears  [] Problems with memory    [] Blurred vision  [x] Irritability  [x] Problems with task completion    [] Double vision  [] Anger  [x]  Problems with relaxation  [x] Loss of appetite  [] Anxiety  [x] Neck tightness, Neck pain  [x] Nausea   [x] Nasal congestion  [] Vomiting           11. Headache improving factors:  [x] Sleep  [] Heat  [x] Darkness  [x] Ice  [x] Local pressure [] Menses (period)  [] Massage   [x] Medications:         12. Headache worsening factors:    [x] Fatigue [x] Sneezing  [x] Changes in Weather  [x] Light [] Bending Over [] Stress  [x] Noise [] Ovulation  [] Multiple Sclerosis Flare-Up  [] Smells  [] Menses  [] Food    [x] Coughing [] Alcohol        13. Number of caffeinated drinks per day: 0        14. Number of diet drinks per day: 0     Review of Systems   Constitutional:  Negative for appetite change, chills, fatigue and fever.   HENT: Negative for dental problem, ear pain, hearing loss, tinnitus, trouble swallowing and voice change.    Eyes: Negative for photophobia, pain and visual disturbance.   Respiratory: Negative for cough, chest tightness and shortness of breath.    Cardiovascular: Negative for chest pain, palpitations and leg swelling.   Gastrointestinal: Negative for abdominal pain, blood in stool, constipation, diarrhea, nausea and vomiting.   Endocrine: Negative for cold intolerance and heat intolerance.   Genitourinary: Negative for difficulty urinating, dysuria and urgency.   Musculoskeletal: Negative for arthralgias, back pain, gait problem, myalgias, neck pain and neck stiffness.   Skin: Negative for color change, pallor and rash.    Neurological: Positive for headaches. Negative for dizziness, tremors, seizures, syncope, facial asymmetry, speech difficulty, weakness, light-headedness and numbness.   Hematological: Negative for adenopathy. Does not bruise/bleed easily.   Psychiatric/Behavioral: Negative for agitation, behavioral problems, confusion, decreased concentration, self-injury, sleep disturbance and suicidal ideas. The patient is not nervous/anxious.          Past Medical History   Diagnosis Date    Abnormal liver enzymes     Anemia     Arthritis     Asthma     Azotemia     Diverticulosis     Esophageal reflux     H/O Nasal MRSA      Hepatitis, unspecified     Hypotension     Infectious mononucleosis     Insomnia     Instability of knee joint     Left Axillary Furuncle 1/26/16     Migraine     Obesity, unspecified     ALPHONSO on CPAP     Other and unspecified hyperlipidemia     Other seborrheic keratosis     Other specified disorder of male genital organs(608.89)     Pulmonary nodule     Renal stone     Seasonal allergic rhinitis     Snoring     Spontaneous ecchymoses     Unspecified essential hypertension     Unspecified vitamin D deficiency     Variants of migraine, not elsewhere classified, without mention of intractable migraine without mention of status migrainosus      Past Surgical History   Procedure Laterality Date    Appendectomy      [place] tooth extraction      Inguinal hernia repair      Wrist fracture surgery Left     Gastric sleeve  11/2012    Radiofrequency ablation       lumbar back    Esophageal dilation       2016    Transformal injections       x 2     Family History   Problem Relation Age of Onset    Hypertension Mother     Stroke Mother     Cancer Father      throat    Alcohol abuse Father     Heart disease Father     Heart attacks under age 50 Father 49     Social History     Social History    Marital status:      Spouse name: N/A    Number of children: N/A     Years of education: N/A     Occupational History    Not on file.     Social History Main Topics    Smoking status: Never Smoker    Smokeless tobacco: Not on file    Alcohol use No    Drug use: No    Sexual activity: Not on file     Other Topics Concern    Not on file     Social History Narrative     Allergies   Allergen Reactions    Tetracyclines Rash       Current Outpatient Prescriptions   Medication Sig    butalbital-acetaminophen-caffeine -40 mg (FIORICET, ESGIC) -40 mg per tablet Take 1 tablet by mouth every 6 (six) hours as needed for Headaches. RX must last full 30 days.    dexlansoprazole (DEXILANT) 30 mg CpDM Take 60 mg by mouth once daily.     dihydroergotamine (MIGRANAL) 0.5 mg/pump act. (4 mg/mL) nasal spray Use in one nostril as directed.  No more than 4 sprays in one hour  Please provide the patient with 90 day supply    diphenoxylate-atropine 2.5-0.025 mg (LOMOTIL) 2.5-0.025 mg per tablet Take 1 tablet by mouth 4 (four) times daily as needed for Diarrhea.    eletriptan (RELPAX) 40 MG tablet Take 1 tablet by mouth, may repeat in 2 hours if necessary  Allow 24 tablets, a 90 day supply    eszopiclone 3 mg Tab Take 1 tablet by mouth every evening.    ferrous sulfate 325 mg (65 mg iron) Tab tablet Take 325 mg by mouth 2 (two) times daily.     hydrochlorothiazide (MICROZIDE) 12.5 mg capsule Take 1 capsule (12.5 mg total) by mouth once daily.    ibuprofen (ADVIL,MOTRIN) 800 MG tablet Take 800 mg by mouth.    irbesartan (AVAPRO) 150 MG tablet Take 1 tablet (150 mg total) by mouth once daily.    lidocaine-prilocaine (EMLA) cream     melatonin 10 mg Cap Take 1 capsule by mouth every evening.     METHYLCELLULOSE, WITH SUGAR, (CITRUCEL, SUCROSE, ORAL) Take 1 Dose by mouth every evening.    ondansetron (ZOFRAN) 4 MG tablet Take 4 mg by mouth.    ondansetron (ZOFRAN-ODT) 8 MG TbDL Take 1 tablet (8 mg total) by mouth every 6 (six) hours as needed.    oxycodone-acetaminophen  (PERCOCET)  mg per tablet Take 1 tablet by mouth.    phenobarb-hyoscy-atropine-scop (DONNATAL) 16.2 mg-0.1037 mg/5 mL (5 mL) Elix Take 15 mLs by mouth daily as needed.    polyethylene glycol (GLYCOLAX) 17 gram/dose powder Take 17 g by mouth once daily.     QNASL 80 mcg/actuation HFAA     riboflavin, vitamin B2, 400 mg Tab Take by mouth.    tizanidine (ZANAFLEX) 4 MG tablet TAKE 1 TABLET BY MOUTH THREE TIMES DAILY AS NEEDED    vitamin D 1000 units Tab Take 5,000 Units by mouth once daily.    vitamin E 100 UNIT capsule Take 400 Units by mouth 2 (two) times daily.     XOPENEX HFA 45 mcg/actuation inhaler     zonisamide (ZONEGRAN) 100 MG Cap Take 2 capsules (200 mg total) by mouth once daily.    diclofenac potassium (CAMBIA) 50 mg PwPk Take 1 Package by mouth daily as needed.     Current Facility-Administered Medications   Medication    sodium chloride 0.9% 100 mL flush bag    sodium chloride 0.9% flush 10 mL    sodium chloride 0.9% flush 10 mL         Objective:      Vitals:    08/23/18 1536   BP: (!) 157/86   Pulse: 61   Resp: 16     Body mass index is 28.96 kg/m².    Physical Exam    Constitutional:     He appears well-developed and well-nourished. He is well groomed  HENT:    Head: Atraumatic, oral and nasal mucosa intact  Eyes: Conjunctival hyperemia OS, OD is clear.. Pupils are equal, round, and reactive to light OU  Neck: Neck supple. No thyromegaly present  Cardiovascular: Normal rate and normal heart sounds  No murmur heard  Pulmonary/Chest: Effort normal and breath sounds normal  Skin: Skin is warm and dry  Psychiatric: Normal mood and affect     Neuro exam:  MINI MENTAL ESTATE EXAM 30/30  Mental status:  Awake, attentive, Alert, oriented to self, place, year and month  Language function is intact  Naming, repetition and spontaneous meaningful speech expression are intact  Speech fluency is good and speech is clear  Remote and recent memory are good  Patient able to count backwards by 7     No findings to suggest executive dysfuntion    Patient has adequate insight    Mood is stable    Cranial Nerves:  Smell was not formally evaluated  Cranial Nerves II - XII: intact  Pursuits were smooth, normal saccades, no nystagmus OU  Motor - facial movement was symmetrical and normal     Palate moved well and was symmetrical with normal palatal and oral sensation  Tongue movements were full    Coordination:     Rapid alternating movements and rapid finger tapping - normal bilaterally  Finger to nose - normal and symmetric bilaterally     Motor:  Normal muscle bulk and symmetry. No fasciculations were noted    No pronator drift  Strength 5/5 bilaterally     Reflexes:  Tendon reflexes were 2 + at biceps, triceps, brachioradialis, patellar, and 1+ Achilles bilaterally    Gait: Normal gait.     Data review:  Lab Results   Component Value Date     03/03/2017    K 4.2 03/03/2017    MG 2.4 07/24/2012     03/03/2017    CO2 30 03/03/2017    BUN 24 (H) 03/03/2017    CREATININE 1.23 03/03/2017    GLU 94 03/03/2017    HGBA1C 5.8 07/24/2012    AST 25 03/03/2017    ALT 29 03/03/2017    ALBUMIN 4.3 03/03/2017    PROT 7.5 03/03/2017    BILITOT 0.5 03/03/2017    CHOL 189 03/03/2017    HDL 73 03/03/2017    LDLCALC 95.0 03/03/2017    TRIG 105 03/03/2017       Lab Results   Component Value Date    WBC 6.93 03/03/2017    HGB 15.2 03/03/2017    HCT 44.6 03/03/2017    MCV 93 03/03/2017     03/03/2017           Assessment and Plan     Migraine without Aura, chronic. Continue with Zonisamide 200 mg daily and B2 400 mg daily for added prevention. For acute treatment continue with Zofran prn, and Relpax, alternating with Migranal. Rescue with Fioricet (never exceeds 10 in 20 days and often uses less)  Significant improvement, about 60% on Aimovig 70 mg, since there is still room for improvement, I will increase the dose to 140 mg.  Medication Overuse Headache. Doing well with gradual tapering  Prescription for  Gammacore given  RTC in 3 or 4 months with headache diary    Counseling:  More than 50% of the 25 minute encounter was spent face to face counseling the patient regarding current status and future plan of care as well as side of the medications. All questions were answered to patient's satisfaction        Dana Conn M.D  Medical Director, Headache and Facial Pain  [facility]

## 2018-08-27 ENCOUNTER — TELEPHONE (OUTPATIENT)
Dept: NEUROLOGY | Facility: CLINIC | Age: 63
End: 2018-08-27

## 2018-08-27 NOTE — TELEPHONE ENCOUNTER
Spoke with Kaycee at [facility]. Informed her that Dr. Conn had sign a form giving them permission to upgrade the old devices to the new sapphire device. Kaycee verbalized understanding.

## 2018-08-27 NOTE — TELEPHONE ENCOUNTER
----- Message from Yulissa Bob sent at 8/24/2018  4:11 PM CDT -----  Contact: Prudence with [facility]  The patient is requesting durable medical equipment, Gammacore, but the patient is requesting for the Sapphire Gammacore device.  Can a refill enrollment form be sent in for the Sapphire?  Call Back#[phone]  Fax#[phone]  Thanks

## 2018-08-30 ENCOUNTER — PATIENT MESSAGE (OUTPATIENT)
Dept: NEUROLOGY | Facility: CLINIC | Age: 63
End: 2018-08-30

## 2018-08-30 PROBLEM — Z98.84 HISTORY OF BARIATRIC SURGERY: Status: ACTIVE | Noted: 2018-08-30

## 2018-08-31 ENCOUNTER — PATIENT MESSAGE (OUTPATIENT)
Dept: NEUROLOGY | Facility: CLINIC | Age: 63
End: 2018-08-31

## 2018-09-05 ENCOUNTER — PATIENT MESSAGE (OUTPATIENT)
Dept: NEUROLOGY | Facility: CLINIC | Age: 63
End: 2018-09-05

## 2018-09-05 DIAGNOSIS — G43.711 CHRONIC MIGRAINE WITHOUT AURA, WITH INTRACTABLE MIGRAINE, SO STATED, WITH STATUS MIGRAINOSUS: Primary | ICD-10-CM

## 2018-09-06 ENCOUNTER — TELEPHONE (OUTPATIENT)
Dept: NEUROLOGY | Facility: CLINIC | Age: 63
End: 2018-09-06

## 2018-09-06 NOTE — TELEPHONE ENCOUNTER
----- Message from Rickey Matos sent at 9/6/2018  9:08 AM CDT -----  Type: Needs Medical Advice    Who Called:  [facility]  Pharmacy name and phone #:   Best Call Back Number: [phone]. [phone]  Additional Information: Caller states that they need to obtain a new prescription for erenumab-aooe (AIMOVIG AUTOINJECTOR) 70 mg/mL AtIn .

## 2018-09-06 NOTE — TELEPHONE ENCOUNTER
Called speciality pharmacy and spoke with [place] in regards to this patient. Answered all questions and concerns in regards to medication. Pharmacist verbalized understanding.

## 2018-09-14 ENCOUNTER — PATIENT MESSAGE (OUTPATIENT)
Dept: NEUROLOGY | Facility: CLINIC | Age: 63
End: 2018-09-14

## 2018-09-16 ENCOUNTER — PATIENT MESSAGE (OUTPATIENT)
Dept: NEUROLOGY | Facility: CLINIC | Age: 63
End: 2018-09-16

## 2018-09-17 ENCOUNTER — PATIENT MESSAGE (OUTPATIENT)
Dept: NEUROLOGY | Facility: CLINIC | Age: 63
End: 2018-09-17

## 2018-09-17 DIAGNOSIS — G43.711 CHRONIC MIGRAINE WITHOUT AURA, WITH INTRACTABLE MIGRAINE, SO STATED, WITH STATUS MIGRAINOSUS: ICD-10-CM

## 2018-09-18 ENCOUNTER — PROCEDURE VISIT (OUTPATIENT)
Dept: NEUROLOGY | Facility: CLINIC | Age: 63
End: 2018-09-18
Payer: COMMERCIAL

## 2018-09-18 VITALS
HEART RATE: 76 BPM | DIASTOLIC BLOOD PRESSURE: 94 MMHG | BODY MASS INDEX: 29.12 KG/M2 | SYSTOLIC BLOOD PRESSURE: 153 MMHG | WEIGHT: 215 LBS | RESPIRATION RATE: 16 BRPM | HEIGHT: 72 IN

## 2018-09-18 DIAGNOSIS — M54.81 BILATERAL OCCIPITAL NEURALGIA: ICD-10-CM

## 2018-09-18 DIAGNOSIS — M79.2 NEURALGIA AND NEURITIS: Primary | ICD-10-CM

## 2018-09-18 PROCEDURE — 64405 NJX AA&/STRD GR OCPL NRV: CPT | Mod: 51,50,S$GLB, | Performed by: PSYCHIATRY & NEUROLOGY

## 2018-09-18 PROCEDURE — 64450 NJX AA&/STRD OTHER PN/BRANCH: CPT | Mod: 50,S$GLB,, | Performed by: PSYCHIATRY & NEUROLOGY

## 2018-09-18 RX ORDER — FROVATRIPTAN SUCCINATE 2.5 MG/1
2.5 TABLET, FILM COATED ORAL
Qty: 9 TABLET | Refills: 3 | Status: SHIPPED | OUTPATIENT
Start: 2018-09-18 | End: 2018-11-15 | Stop reason: SDUPTHER

## 2018-09-18 NOTE — PROCEDURES
Greater and Lesser occipital Nerve block b/l   A time out was conducted just before the start of the procedure to verify the correct patient and procedure, procedure location, and all relevant critical information.   After risks and benefits were explained including bleeding, infection, worsening of the pain, damage to the area being injected, weakness, allergic reaction to medications, vascular injection, and nerve damage, and verbal consent was obtained. All questions were answered.   The area of the occipital nerves were identified and the skin prepped three times with alcohol and the alcohol allowed to dry. Anatomical landmark of occipital protuberance and mastoid identified and area 2cm lateral to external occipital protuberance located, next a 30 gauge 1 inch needle was placed in the area and after negative aspiration, medication was injected. Procedure repeated in the area of lesser occipital nerve which is 1/3 the distance between mastoid and external occipital protuberance injected after negative aspiration.     Medication used: Lidocaine 1% and Bupivacaine 0.25%.   Her pain post procedure was a 0/10 and no cutaneous allodynia present.   All questions answered and patient had no complications from procedure.   Instructed patient to stay very well hydrated with electrolyte liquids and to stay in cool environments to avoid causing triggers for today.   SUPRAORBITAL AND SUPRATROCHLEAR  After having been explained the risks and benefits of the procedure, the patient provided verbal consent . Then, the area over the bilateral supraorbital and supratrochlear nerves was prepped with alcohol. Using a 30 gauge needle, 0.75 mL of local anesthetic was injected into each of the 4 nerves. There was negative aspiration for blood. The patient tolerated the procedure well without any apparent complications.  The local anesthetic was equal mixtures of lidocaine 1% and bupivacaine 0.25%.  The patient tolerated the procedure  well and no complications were encountered.  AURICULOTEMPORAL Block, bilateral  After having been explained the risks and benefits of the procedure, the patient provided consent . Then, the area over the auriculotemporal nerves was prepped with alcohol. Using a 30 gauge 1/2 inch needle, 1mL of local anesthetic was injected into each of the nerves bilaterally. There was negative aspiration for blood. The patient tolerated the procedure well without any apparent complications.  The local anesthetic was bupivacaine 0.25%.  The patient tolerated the procedure well and no complications were encountered.        Dana Conn M.D  Medical Director, Headache and Facial Pain  [facility]

## 2018-09-24 ENCOUNTER — PATIENT MESSAGE (OUTPATIENT)
Dept: NEUROLOGY | Facility: CLINIC | Age: 63
End: 2018-09-24

## 2018-09-24 DIAGNOSIS — G43.711 CHRONIC MIGRAINE WITHOUT AURA, WITH INTRACTABLE MIGRAINE, SO STATED, WITH STATUS MIGRAINOSUS: Primary | ICD-10-CM

## 2018-09-26 ENCOUNTER — PATIENT MESSAGE (OUTPATIENT)
Dept: NEUROLOGY | Facility: CLINIC | Age: 63
End: 2018-09-26

## 2018-09-26 DIAGNOSIS — G43.711 CHRONIC MIGRAINE WITHOUT AURA, WITH INTRACTABLE MIGRAINE, SO STATED, WITH STATUS MIGRAINOSUS: Primary | ICD-10-CM

## 2018-09-26 RX ORDER — BUTALBITAL, ASPIRIN, CAFFEINE AND CODEINE PHOSPHATE 50; 325; 40; 30 MG/1; MG/1; MG/1; MG/1
1 CAPSULE ORAL EVERY 4 HOURS PRN
Qty: 15 CAPSULE | Refills: 3 | Status: SHIPPED | OUTPATIENT
Start: 2018-09-26 | End: 2018-10-26

## 2018-10-03 RX ORDER — BUTALBITAL, ASPIRIN, CAFFEINE AND CODEINE PHOSPHATE 50; 325; 40; 30 MG/1; MG/1; MG/1; MG/1
1 CAPSULE ORAL EVERY 4 HOURS PRN
Qty: 15 CAPSULE | Refills: 4 | Status: SHIPPED | OUTPATIENT
Start: 2018-10-03 | End: 2018-10-13

## 2018-10-05 ENCOUNTER — TELEPHONE (OUTPATIENT)
Dept: NEUROLOGY | Facility: CLINIC | Age: 63
End: 2018-10-05

## 2018-10-19 ENCOUNTER — PATIENT MESSAGE (OUTPATIENT)
Dept: NEUROLOGY | Facility: CLINIC | Age: 63
End: 2018-10-19

## 2018-10-22 ENCOUNTER — TELEPHONE (OUTPATIENT)
Dept: NEUROLOGY | Facility: CLINIC | Age: 63
End: 2018-10-22

## 2018-10-22 NOTE — TELEPHONE ENCOUNTER
Returned pharmacy call in regards to them continuing dispensing generic form of medication. Informed them it was okay to dispense. Pharmacist verbalized understanding.

## 2018-10-22 NOTE — TELEPHONE ENCOUNTER
----- Message from Yulissa Bob sent at 10/22/2018 11:34 AM CDT -----  Contact: Ana with [facility]  Type:  Pharmacy Calling to Clarify an RX    Name of Caller:  Ana  Pharmacy Name:  [facility]  Prescription Name:  zonisamide (ZONEGRAN) 100 MG Cap  What do they need to clarify?:  Can they continue to dispense the generic for the patient  Best Call Back Number:    Additional Information:  Ref# [id number] ; they are asking for a call back today before 3p.m.

## 2018-10-24 ENCOUNTER — PATIENT MESSAGE (OUTPATIENT)
Dept: NEUROLOGY | Facility: CLINIC | Age: 63
End: 2018-10-24

## 2018-10-26 RX ORDER — DIHYDROERGOTAMINE MESYLATE 4 MG/ML
SPRAY NASAL
Qty: 24 ML | Refills: 3 | Status: SHIPPED | OUTPATIENT
Start: 2018-10-26 | End: 2019-12-09 | Stop reason: SDUPTHER

## 2018-11-13 ENCOUNTER — PATIENT MESSAGE (OUTPATIENT)
Dept: NEUROLOGY | Facility: CLINIC | Age: 63
End: 2018-11-13

## 2018-11-13 ENCOUNTER — TELEPHONE (OUTPATIENT)
Dept: NEUROLOGY | Facility: CLINIC | Age: 63
End: 2018-11-13

## 2018-11-13 DIAGNOSIS — G43.719 INTRACTABLE CHRONIC MIGRAINE WITHOUT AURA AND WITHOUT STATUS MIGRAINOSUS: Primary | ICD-10-CM

## 2018-11-13 RX ORDER — BUTALBITAL, ACETAMINOPHEN AND CAFFEINE 50; 325; 40 MG/1; MG/1; MG/1
CAPSULE ORAL
Qty: 10 CAPSULE | Refills: 3 | Status: SHIPPED | OUTPATIENT
Start: 2018-11-13 | End: 2018-11-30 | Stop reason: SDUPTHER

## 2018-11-13 RX ORDER — BUTALBITAL, ACETAMINOPHEN AND CAFFEINE 50; 325; 40 MG/1; MG/1; MG/1
CAPSULE ORAL
Qty: 10 CAPSULE | Refills: 3 | Status: SHIPPED | OUTPATIENT
Start: 2018-11-13 | End: 2019-01-24 | Stop reason: SDUPTHER

## 2018-11-15 ENCOUNTER — PATIENT MESSAGE (OUTPATIENT)
Dept: NEUROLOGY | Facility: CLINIC | Age: 63
End: 2018-11-15

## 2018-11-15 DIAGNOSIS — G43.719 INTRACTABLE CHRONIC MIGRAINE WITHOUT AURA AND WITHOUT STATUS MIGRAINOSUS: Primary | ICD-10-CM

## 2018-11-15 RX ORDER — FROVATRIPTAN SUCCINATE 2.5 MG/1
2.5 TABLET, FILM COATED ORAL
Qty: 9 TABLET | Refills: 3 | Status: SHIPPED | OUTPATIENT
Start: 2018-11-15 | End: 2019-02-07

## 2018-11-16 ENCOUNTER — PATIENT MESSAGE (OUTPATIENT)
Dept: NEUROLOGY | Facility: CLINIC | Age: 63
End: 2018-11-16

## 2018-12-18 ENCOUNTER — PATIENT MESSAGE (OUTPATIENT)
Dept: NEUROLOGY | Facility: CLINIC | Age: 63
End: 2018-12-18

## 2018-12-19 ENCOUNTER — PATIENT MESSAGE (OUTPATIENT)
Dept: NEUROLOGY | Facility: CLINIC | Age: 63
End: 2018-12-19

## 2018-12-19 DIAGNOSIS — G43.719 INTRACTABLE CHRONIC MIGRAINE WITHOUT AURA AND WITHOUT STATUS MIGRAINOSUS: Primary | ICD-10-CM

## 2018-12-19 RX ORDER — BUTALBITAL, ACETAMINOPHEN, CAFFEINE AND CODEINE PHOSPHATE 50; 325; 40; 30 MG/1; MG/1; MG/1; MG/1
1 CAPSULE ORAL EVERY 4 HOURS PRN
Qty: 10 EACH | Refills: 3 | Status: SHIPPED | OUTPATIENT
Start: 2018-12-19 | End: 2019-01-18

## 2019-01-21 ENCOUNTER — PATIENT MESSAGE (OUTPATIENT)
Dept: NEUROLOGY | Facility: CLINIC | Age: 64
End: 2019-01-21

## 2019-01-22 ENCOUNTER — PATIENT MESSAGE (OUTPATIENT)
Dept: NEUROLOGY | Facility: CLINIC | Age: 64
End: 2019-01-22

## 2019-01-23 ENCOUNTER — PATIENT MESSAGE (OUTPATIENT)
Dept: NEUROLOGY | Facility: CLINIC | Age: 64
End: 2019-01-23

## 2019-01-24 ENCOUNTER — TELEPHONE (OUTPATIENT)
Dept: NEUROLOGY | Facility: CLINIC | Age: 64
End: 2019-01-24

## 2019-01-24 ENCOUNTER — PATIENT MESSAGE (OUTPATIENT)
Dept: NEUROLOGY | Facility: CLINIC | Age: 64
End: 2019-01-24

## 2019-01-24 DIAGNOSIS — G43.719 INTRACTABLE CHRONIC MIGRAINE WITHOUT AURA AND WITHOUT STATUS MIGRAINOSUS: Primary | ICD-10-CM

## 2019-01-24 DIAGNOSIS — G43.719 INTRACTABLE CHRONIC MIGRAINE WITHOUT AURA AND WITHOUT STATUS MIGRAINOSUS: ICD-10-CM

## 2019-01-28 ENCOUNTER — PATIENT MESSAGE (OUTPATIENT)
Dept: NEUROLOGY | Facility: CLINIC | Age: 64
End: 2019-01-28

## 2019-01-28 DIAGNOSIS — G43.719 INTRACTABLE CHRONIC MIGRAINE WITHOUT AURA AND WITHOUT STATUS MIGRAINOSUS: ICD-10-CM

## 2019-01-28 RX ORDER — BUTALBITAL, ACETAMINOPHEN AND CAFFEINE 50; 325; 40 MG/1; MG/1; MG/1
CAPSULE ORAL
Qty: 10 CAPSULE | Refills: 3 | Status: SHIPPED | OUTPATIENT
Start: 2019-01-28 | End: 2019-01-28 | Stop reason: SDUPTHER

## 2019-01-28 RX ORDER — ELETRIPTAN HYDROBROMIDE 40 MG/1
TABLET, FILM COATED ORAL
Qty: 18 TABLET | Refills: 11 | OUTPATIENT
Start: 2019-01-28 | End: 2019-05-24 | Stop reason: SDUPTHER

## 2019-01-28 RX ORDER — BUTALBITAL, ACETAMINOPHEN AND CAFFEINE 50; 325; 40 MG/1; MG/1; MG/1
CAPSULE ORAL
Qty: 10 CAPSULE | Refills: 3 | Status: SHIPPED | OUTPATIENT
Start: 2019-01-28 | End: 2020-06-17

## 2019-01-29 ENCOUNTER — TELEPHONE (OUTPATIENT)
Dept: NEUROLOGY | Facility: CLINIC | Age: 64
End: 2019-01-29

## 2019-01-29 ENCOUNTER — PATIENT MESSAGE (OUTPATIENT)
Dept: NEUROLOGY | Facility: CLINIC | Age: 64
End: 2019-01-29

## 2019-01-29 NOTE — TELEPHONE ENCOUNTER
----- Message from La Rader sent at 1/29/2019  3:34 PM CST -----  Contact: [facility]  [facility] calling wanting to know if the Dr wants tablets or capsule on the ptbutalbital-acetaminophen-caff -40 mg -40 mg Cap cause the directions says tablet and it does comes in capsules and tablets.Please call back for clarification at.  [facility] [address] [place] [address] & [address] [address]  Phone: [phone] Fax: [phone]

## 2019-01-29 NOTE — TELEPHONE ENCOUNTER
Called and spoke with Marcie in pharmacy in regards to medications and questions. All questions and concerns addressed. Patient verbalized understanding.

## 2019-01-30 ENCOUNTER — PATIENT MESSAGE (OUTPATIENT)
Dept: NEUROLOGY | Facility: CLINIC | Age: 64
End: 2019-01-30

## 2019-01-30 RX ORDER — ELETRIPTAN HYDROBROMIDE 40 MG/1
40 TABLET, FILM COATED ORAL
Qty: 6 TABLET | Refills: 11 | Status: SHIPPED | OUTPATIENT
Start: 2019-01-30 | End: 2019-02-07 | Stop reason: SDUPTHER

## 2019-03-05 ENCOUNTER — PATIENT MESSAGE (OUTPATIENT)
Dept: NEUROLOGY | Facility: CLINIC | Age: 64
End: 2019-03-05

## 2019-03-06 ENCOUNTER — PATIENT MESSAGE (OUTPATIENT)
Dept: NEUROLOGY | Facility: CLINIC | Age: 64
End: 2019-03-06

## 2019-03-26 ENCOUNTER — TELEPHONE (OUTPATIENT)
Dept: NEUROLOGY | Facility: CLINIC | Age: 64
End: 2019-03-26

## 2019-03-26 ENCOUNTER — PATIENT MESSAGE (OUTPATIENT)
Dept: NEUROLOGY | Facility: CLINIC | Age: 64
End: 2019-03-26

## 2019-03-26 ENCOUNTER — OFFICE VISIT (OUTPATIENT)
Dept: NEUROLOGY | Facility: CLINIC | Age: 64
End: 2019-03-26
Payer: COMMERCIAL

## 2019-03-26 VITALS
BODY MASS INDEX: 29.5 KG/M2 | HEART RATE: 66 BPM | HEIGHT: 72 IN | RESPIRATION RATE: 16 BRPM | SYSTOLIC BLOOD PRESSURE: 169 MMHG | DIASTOLIC BLOOD PRESSURE: 97 MMHG | WEIGHT: 217.81 LBS

## 2019-03-26 DIAGNOSIS — N28.9 RENAL INSUFFICIENCY: ICD-10-CM

## 2019-03-26 DIAGNOSIS — M79.2 NEURALGIA AND NEURITIS: ICD-10-CM

## 2019-03-26 DIAGNOSIS — G43.719 INTRACTABLE CHRONIC MIGRAINE WITHOUT AURA AND WITHOUT STATUS MIGRAINOSUS: Primary | ICD-10-CM

## 2019-03-26 DIAGNOSIS — G47.00 INSOMNIA, UNSPECIFIED TYPE: ICD-10-CM

## 2019-03-26 DIAGNOSIS — M54.81 BILATERAL OCCIPITAL NEURALGIA: ICD-10-CM

## 2019-03-26 DIAGNOSIS — I10 ESSENTIAL HYPERTENSION: ICD-10-CM

## 2019-03-26 PROCEDURE — 3077F SYST BP >= 140 MM HG: CPT | Mod: CPTII,S$GLB,, | Performed by: PSYCHIATRY & NEUROLOGY

## 2019-03-26 PROCEDURE — 99214 PR OFFICE/OUTPT VISIT, EST, LEVL IV, 30-39 MIN: ICD-10-PCS | Mod: S$GLB,,, | Performed by: PSYCHIATRY & NEUROLOGY

## 2019-03-26 PROCEDURE — 3080F DIAST BP >= 90 MM HG: CPT | Mod: CPTII,S$GLB,, | Performed by: PSYCHIATRY & NEUROLOGY

## 2019-03-26 PROCEDURE — 3077F PR MOST RECENT SYSTOLIC BLOOD PRESSURE >= 140 MM HG: ICD-10-PCS | Mod: CPTII,S$GLB,, | Performed by: PSYCHIATRY & NEUROLOGY

## 2019-03-26 PROCEDURE — 99999 PR PBB SHADOW E&M-EST. PATIENT-LVL III: CPT | Mod: PBBFAC,,, | Performed by: PSYCHIATRY & NEUROLOGY

## 2019-03-26 PROCEDURE — 3008F PR BODY MASS INDEX (BMI) DOCUMENTED: ICD-10-PCS | Mod: CPTII,S$GLB,, | Performed by: PSYCHIATRY & NEUROLOGY

## 2019-03-26 PROCEDURE — 99999 PR PBB SHADOW E&M-EST. PATIENT-LVL III: ICD-10-PCS | Mod: PBBFAC,,, | Performed by: PSYCHIATRY & NEUROLOGY

## 2019-03-26 PROCEDURE — 3080F PR MOST RECENT DIASTOLIC BLOOD PRESSURE >= 90 MM HG: ICD-10-PCS | Mod: CPTII,S$GLB,, | Performed by: PSYCHIATRY & NEUROLOGY

## 2019-03-26 PROCEDURE — 3008F BODY MASS INDEX DOCD: CPT | Mod: CPTII,S$GLB,, | Performed by: PSYCHIATRY & NEUROLOGY

## 2019-03-26 PROCEDURE — 99214 OFFICE O/P EST MOD 30 MIN: CPT | Mod: S$GLB,,, | Performed by: PSYCHIATRY & NEUROLOGY

## 2019-03-26 RX ORDER — METHYLERGONOVINE MALEATE 0.2 MG/1
TABLET ORAL
Qty: 90 TABLET | Refills: 4 | Status: SHIPPED | OUTPATIENT
Start: 2019-03-26 | End: 2019-05-01

## 2019-03-26 RX ORDER — HYDROCODONE BITARTRATE AND ACETAMINOPHEN 10; 325 MG/1; MG/1
TABLET ORAL
COMMUNITY
Start: 2019-03-25 | End: 2019-11-08

## 2019-03-26 RX ORDER — DULOXETIN HYDROCHLORIDE 20 MG/1
20 CAPSULE, DELAYED RELEASE ORAL DAILY
Qty: 90 CAPSULE | Refills: 0 | Status: SHIPPED | OUTPATIENT
Start: 2019-03-26 | End: 2019-08-02

## 2019-03-26 RX ORDER — BUTALBITAL, ACETAMINOPHEN, CAFFEINE AND CODEINE PHOSPHATE 300; 50; 40; 30 MG/1; MG/1; MG/1; MG/1
1 CAPSULE ORAL EVERY 4 HOURS
Qty: 10 CAPSULE | Refills: 0 | Status: SHIPPED | OUTPATIENT
Start: 2019-03-26 | End: 2019-05-01 | Stop reason: SDUPTHER

## 2019-03-26 RX ORDER — AMOXICILLIN 500 MG/1
TABLET, FILM COATED ORAL
COMMUNITY
Start: 2019-03-25 | End: 2019-08-19

## 2019-03-26 NOTE — PROGRESS NOTES
Subjective:       Patient ID: Booker Back is a 62 y.o. male.    Chief Complaint: Headache    INTERVAL HISTORY 3/26/2019  The patient comes for follow up. He is benefiting from Aimovig, now at 140 mg every 28 days. He also benefited from Cymbalta but when he went to 60 mg he became very sedated. He is aware that he is in medication overuse headache and is perpetually trying to come off it. He would like to try strategies to break the cycle. He is here to discus options    INTERVAL HISTORY  The patient comes for follow up. He is significantly better. After taking Aimovig 70 mg, he reports about 60% overall improvement. He has noted some improvement with Gammacore but the headache tends to recur. He manages escalations starting with Zofran, then either Relpax or Migranal and rescues with Fioricet. He averages 10 Fioricet per month. He is also on Percocet for his back pain. He has not taken Percocet for 4 days in a row, 2 out of those days he had a headache. Last July, he had aphakia OS, ever since he has intermittent conjunctival redness. He was in a  tour this summer and did quite well overall.    HPI    The patient is a pleasant 60 y/o male with long time history of headaches. Over the years he has seen neurologists headache specialists and has received expert care. In addition to experiencing tension typt headache and migraine headache, he describes an episode of Ophthalmic Migraine, characterized by loss of vision lasting 2 to 3 hours, not followed by head pain which occurred 10 to 15 years prior to two episodes of Transient Global Amnesia. They happened in 2013 and they were about a month apart. He was admitted to the hospital where imaging was obtained as well as EEG all of which was reportedly negative. He was given the diagnosis of TGA, ex juvantivus. He states that it was after the episodes of TGA when he started having migraine headaches. About 12 years ago he was diagnosed with Medication overuse  "headache, he was taking Advil 400 mg twice daily. He saw Dr. Nettie Elena who advised him to stop taking Advil. His headaches were resolved but his back pain flared up. His back pain was so severe that he started taking Advil again resulting in headache relapse. In order to treat his back pain he underwent interventional procedures including MBB and RFA without significant relief. When offered a spinal cord stimulator he pursued a second opinion. Instead of having the SCS placed he decided to undergo trans foraminal injections. When done at one level worked about 30% but when given at 3 levels it worked very well. He ascribes a lot of his headaches to "sinus problems." He had 4 headaches last month of October, thus far he has had 4 headaches in November. This increased is attributed to recent sinus surgery. His past medical history is also noteworthy for bariatric surgery, a sleeve stomach reduction which has resulted in profound weight loss. He is overall pleased on his current regime which includes Zonisamide 200 mg daily, Zofran 8 mg prn, Migranal alternating with Relpax and limited doses of Fioricet for rescue. For prevention, he has also tried Gabapentin for 1 month, poorly tolerated. Botox, 3 rounds by Dr. Ky Jensen provided no relief.  He is not a candidate for Topamax because of risk of kidney stones. He cannot take Beta blockers because of history of asthma. He has taken Elavil in the past for reactive depression with no effect on his headaches.   Please see details of headache characteristics headache below.  Headache questionnaire     1. When did your Headaches start?    Age 9     2. Where are your headaches located?   Usually left temple      3. Your headache's characteristics:  Excruciating, Pressure, Throbbing, Pounding, Stabbing, Like a tight band        4. How long does the headache last?  Hours, days     5. How often does the headache occur?  Worst when sinus problems        6. Are your headaches " preceded or accompanied by other symptoms? yes  If yes, please describe. Sometimes photophobia, occasional aura        7. Does the headache awaken you at night? no  If so, how often?            8. Please laura the word that best describes your headache's intensity:    moderate        9. Using a scale of 1 through 10, with 0 = no pain and 10 = the worst pain:  What score is your headache now? 0  What score is your headache at its worst? 9  What score is your headache at its best? 1         10. Possible associated headache symptoms:  [x]  Sensitivity to light  [] Dizziness  [x] Nasal or sinus pressure/ pain   [x] Sensitivity to noise  [] Vertigo  [] Problems with concentration  [] Sensitivity to smells  [] Ringing in ears  [] Problems with memory    [] Blurred vision  [x] Irritability  [x] Problems with task completion    [] Double vision  [] Anger  [x]  Problems with relaxation  [x] Loss of appetite  [] Anxiety  [x] Neck tightness, Neck pain  [x] Nausea   [x] Nasal congestion  [] Vomiting           11. Headache improving factors:  [x] Sleep  [] Heat  [x] Darkness  [x] Ice  [x] Local pressure [] Menses (period)  [] Massage   [x] Medications:         12. Headache worsening factors:    [x] Fatigue [x] Sneezing  [x] Changes in Weather  [x] Light [] Bending Over [] Stress  [x] Noise [] Ovulation  [] Multiple Sclerosis Flare-Up  [] Smells  [] Menses  [] Food    [x] Coughing [] Alcohol        13. Number of caffeinated drinks per day: 0        14. Number of diet drinks per day: 0     Review of Systems   Constitutional:  Negative for appetite change, chills, fatigue and fever.   HENT: Negative for dental problem, ear pain, hearing loss, tinnitus, trouble swallowing and voice change.    Eyes: Negative for photophobia, pain and visual disturbance.   Respiratory: Negative for cough, chest tightness and shortness of breath.    Cardiovascular: Negative for chest pain, palpitations and leg swelling.   Gastrointestinal: Negative for  abdominal pain, blood in stool, constipation, diarrhea, nausea and vomiting.   Endocrine: Negative for cold intolerance and heat intolerance.   Genitourinary: Negative for difficulty urinating, dysuria and urgency.   Musculoskeletal: Negative for arthralgias, back pain, gait problem, myalgias, neck pain and neck stiffness.   Skin: Negative for color change, pallor and rash.   Neurological: Positive for headaches. Negative for dizziness, tremors, seizures, syncope, facial asymmetry, speech difficulty, weakness, light-headedness and numbness.   Hematological: Negative for adenopathy. Does not bruise/bleed easily.   Psychiatric/Behavioral: Negative for agitation, behavioral problems, confusion, decreased concentration, self-injury, sleep disturbance and suicidal ideas. The patient is not nervous/anxious.          Past Medical History   Diagnosis Date    Abnormal liver enzymes     Anemia     Arthritis     Asthma     Azotemia     Diverticulosis     Esophageal reflux     H/O Nasal MRSA      Hepatitis, unspecified     Hypotension     Infectious mononucleosis     Insomnia     Instability of knee joint     Left Axillary Furuncle 1/26/16     Migraine     Obesity, unspecified     ALPHONSO on CPAP     Other and unspecified hyperlipidemia     Other seborrheic keratosis     Other specified disorder of male genital organs(608.89)     Pulmonary nodule     Renal stone     Seasonal allergic rhinitis     Snoring     Spontaneous ecchymoses     Unspecified essential hypertension     Unspecified vitamin D deficiency     Variants of migraine, not elsewhere classified, without mention of intractable migraine without mention of status migrainosus      Past Surgical History   Procedure Laterality Date    Appendectomy      Knifley tooth extraction      Inguinal hernia repair      Wrist fracture surgery Left     Gastric sleeve  11/2012    Radiofrequency ablation       lumbar back    Esophageal dilation       2016     Transformal injections       x 2     Family History   Problem Relation Age of Onset    Hypertension Mother     Stroke Mother     Cancer Father      throat    Alcohol abuse Father     Heart disease Father     Heart attacks under age 50 Father 49     Social History     Social History    Marital status:      Spouse name: N/A    Number of children: N/A    Years of education: N/A     Occupational History    Not on file.     Social History Main Topics    Smoking status: Never Smoker    Smokeless tobacco: Not on file    Alcohol use No    Drug use: No    Sexual activity: Not on file     Other Topics Concern    Not on file     Social History Narrative     Allergies   Allergen Reactions    Tetracyclines Rash       Current Outpatient Medications   Medication Sig    amoxicillin (AMOXIL) 500 MG Tab     butalbital-acetaminophen-caff -40 mg -40 mg Cap Take 1 Tablet by mouth three times a day as needed for migraines    desloratadine (CLARINEX) 5 mg tablet Take 5 mg by mouth once daily.     dexlansoprazole (DEXILANT) 30 mg CpDM Take 2 capsules (60 mg total) by mouth once daily. (Patient taking differently: Take 60 mg by mouth daily as needed. )    dihydroergotamine (MIGRANAL) 0.5 mg/pump act. (4 mg/mL) nasal spray Use in one nostril as directed.  No more than 4 sprays in one hour  Please provide the patient with 90 day supply    DULoxetine (CYMBALTA) 30 MG capsule Take 1 capsule (30 mg total) by mouth once daily.    eletriptan (RELPAX) 40 MG tablet Take 1 tablet by mouth, may repeat in 2 hours if necessary  Allow 18  tablets, a 90 day supply    erenumab-aooe (AIMOVIG AUTOINJECTOR) 70 mg/mL AtIn Inject 140 mg into the skin every 28 days.    eszopiclone (LUNESTA) 3 mg Tab TAKE 1 TABLET EVERY EVENING    ferrous sulfate 325 mg (65 mg iron) Tab tablet Take 325 mg by mouth 2 (two) times daily.     hydroCHLOROthiazide (HYDRODIURIL) 12.5 MG Tab TAKE 1 TABLET ONCE DAILY     HYDROcodone-acetaminophen (NORCO)  mg per tablet     irbesartan (AVAPRO) 150 MG tablet TAKE 1 TABLET ONCE DAILY    LEVALBUTEROL HCL (XOPENEX INHL) Inhale 2 puffs into the lungs continuous prn.    melatonin 10 mg Cap Take 1 capsule by mouth every evening.     montelukast (SINGULAIR) 10 mg tablet     ondansetron (ZOFRAN-ODT) 8 MG TbDL Take 1 tablet (8 mg total) by mouth every 6 (six) hours as needed.    oxycodone-acetaminophen (PERCOCET)  mg per tablet Take 1 tablet by mouth.    oxymetazoline (AFRIN) 0.05 % nasal spray 2 sprays by Nasal route as needed.     polyethylene glycol (GLYCOLAX) 17 gram/dose powder Take 17 g by mouth once daily.    promethazine (PHENERGAN) 25 MG tablet Take 1 tablet (25 mg total) by mouth 2 (two) times daily as needed for Nausea.    QNASL 80 mcg/actuation HFAA 1 spray by Nasal route 2 (two) times daily.     riboflavin, vitamin B2, 400 mg Tab Take by mouth.    tiZANidine (ZANAFLEX) 4 MG tablet TAKE 1 TABLET BY MOUTH THREE TIMES DAILY AS NEEDED    UNABLE TO FIND medication name: lidocaine, prlocaine, lamotrigine, meloxicam cream apply as directed prn    vitamin D 1000 units Tab Take 5,000 Units by mouth once daily. Vitamin d3    vitamin E 100 UNIT capsule Take 400 Units by mouth 2 (two) times daily.     zonisamide (ZONEGRAN) 100 MG Cap Take 2 capsules (200 mg total) by mouth once daily.     No current facility-administered medications for this visit.          Objective:      Vitals:    03/26/19 1611   BP: (!) 169/97   Pulse: 66   Resp: 16     Body mass index is 29.54 kg/m².    Physical Exam    Constitutional:     He appears well-developed and well-nourished. He is well groomed  HENT:    Head: Atraumatic, oral and nasal mucosa intact  Eyes: Conjunctival hyperemia OS, OD is clear.. Pupils are equal, round, and reactive to light OU  Neck: Neck supple. No thyromegaly present  Cardiovascular: Normal rate and normal heart sounds  No murmur heard  Pulmonary/Chest: Effort  normal and breath sounds normal  Skin: Skin is warm and dry  Psychiatric: Normal mood and affect     Neuro exam:  MINI MENTAL ESTATE EXAM 30/30  Mental status:  Awake, attentive, Alert, oriented to self, place, year and month  Language function is intact  Naming, repetition and spontaneous meaningful speech expression are intact  Speech fluency is good and speech is clear  Remote and recent memory are good  Patient able to count backwards by 7    No findings to suggest executive dysfuntion    Patient has adequate insight    Mood is stable    Cranial Nerves:  Smell was not formally evaluated  Cranial Nerves II - XII: intact  Pursuits were smooth, normal saccades, no nystagmus OU  Motor - facial movement was symmetrical and normal     Palate moved well and was symmetrical with normal palatal and oral sensation  Tongue movements were full    Coordination:     Rapid alternating movements and rapid finger tapping - normal bilaterally  Finger to nose - normal and symmetric bilaterally     Motor:  Normal muscle bulk and symmetry. No fasciculations were noted    No pronator drift  Strength 5/5 bilaterally     Reflexes:  Tendon reflexes were 2 + at biceps, triceps, brachioradialis, patellar, and 1+ Achilles bilaterally    Gait: Normal gait.     Data review:    Lab Results   Component Value Date     09/18/2018    K 3.8 09/18/2018    MG 2.4 07/24/2012     09/18/2018    CO2 27 09/18/2018    BUN 22 (H) 09/18/2018    CREATININE 1.38 09/18/2018     (H) 09/18/2018    HGBA1C 5.8 07/24/2012    AST 17 09/18/2018    ALT 18 09/18/2018    ALBUMIN 4.5 09/18/2018    PROT 7.7 09/18/2018    BILITOT 0.4 09/18/2018    CHOL 189 03/03/2017    HDL 73 03/03/2017    LDLCALC 95.0 03/03/2017    TRIG 105 03/03/2017       Lab Results   Component Value Date    WBC 9.31 09/18/2018    HGB 15.8 09/18/2018    HCT 46.3 09/18/2018    MCV 94 09/18/2018     09/18/2018             Assessment and Plan     Migraine without Aura, chronic.  Continue with Zonisamide 200 mg daily and B2 400 mg daily for added prevention. For acute treatment continue with Zofran prn, and Relpax, alternating with Migranal. Rescue with Fioricet (never exceeds 10 in 20 days and often uses less)  Significant improvement, about 60% on Aimovig 140 mg.  Medication Overuse Headache, will try a Methergine taper to break the cycle (0.2 mg TID for 3 weeks, BID for 2 weeks, and daily for 1 week)  Continue Gammacore   RTC in 3 or 4 months with headache diary    Counseling:  More than 50% of the 25 minute encounter was spent face to face counseling the patient regarding current status and future plan of care as well as side of the medications. All questions were answered to patient's satisfaction        Dana Conn M.D  Medical Director, Headache and Facial Pain  [facility]

## 2019-03-27 ENCOUNTER — TELEPHONE (OUTPATIENT)
Dept: NEUROLOGY | Facility: CLINIC | Age: 64
End: 2019-03-27

## 2019-03-27 RX ORDER — METHYLERGONOVINE MALEATE 0.2 MG/1
TABLET ORAL
Qty: 90 TABLET | Refills: 5 | Status: SHIPPED | OUTPATIENT
Start: 2019-03-27 | End: 2019-05-01

## 2019-04-02 ENCOUNTER — PATIENT MESSAGE (OUTPATIENT)
Dept: NEUROLOGY | Facility: CLINIC | Age: 64
End: 2019-04-02

## 2019-05-01 DIAGNOSIS — G43.719 INTRACTABLE CHRONIC MIGRAINE WITHOUT AURA AND WITHOUT STATUS MIGRAINOSUS: Primary | ICD-10-CM

## 2019-05-01 RX ORDER — BUTALBITAL, ACETAMINOPHEN, CAFFEINE AND CODEINE PHOSPHATE 300; 50; 40; 30 MG/1; MG/1; MG/1; MG/1
1 CAPSULE ORAL EVERY 4 HOURS
Qty: 10 CAPSULE | Refills: 0 | Status: SHIPPED | OUTPATIENT
Start: 2019-05-01 | End: 2019-06-04 | Stop reason: SDUPTHER

## 2019-05-17 DIAGNOSIS — G43.711 CHRONIC MIGRAINE WITHOUT AURA, WITH INTRACTABLE MIGRAINE, SO STATED, WITH STATUS MIGRAINOSUS: ICD-10-CM

## 2019-05-17 RX ORDER — ERENUMAB-AOOE 70 MG/ML
INJECTION SUBCUTANEOUS
Qty: 2 ML | Refills: 10 | Status: SHIPPED | OUTPATIENT
Start: 2019-05-17 | End: 2019-05-24

## 2019-05-22 ENCOUNTER — TELEPHONE (OUTPATIENT)
Dept: NEUROLOGY | Facility: CLINIC | Age: 64
End: 2019-05-22

## 2019-05-22 NOTE — TELEPHONE ENCOUNTER
----- Message from Patria Amos sent at 5/22/2019  2:36 PM CDT -----  Contact: Jinny with [facility]  Type:  Pharmacy Calling to Clarify an RX    Name of Caller:  Jinny  Pharmacy Name:  [facility]  Prescription Name:  AIMOVIG AUTOINJECTOR 70 mg/mL injection  What do they need to clarify?:  Need an ok to fill the one 40 mg pen instead of two of 70mg due to them being discontinued  Best Call Back Number:  [phone]

## 2019-05-22 NOTE — TELEPHONE ENCOUNTER
Spoke to Joan at pt pharmacy and authorized fill of 140 mg injector with same sig and directions instead of two separate injections. Verbalized understanding.

## 2019-05-24 ENCOUNTER — PATIENT MESSAGE (OUTPATIENT)
Dept: NEUROLOGY | Facility: CLINIC | Age: 64
End: 2019-05-24

## 2019-05-24 DIAGNOSIS — G43.719 INTRACTABLE CHRONIC MIGRAINE WITHOUT AURA AND WITHOUT STATUS MIGRAINOSUS: ICD-10-CM

## 2019-05-24 DIAGNOSIS — G43.711 CHRONIC MIGRAINE WITHOUT AURA, WITH INTRACTABLE MIGRAINE, SO STATED, WITH STATUS MIGRAINOSUS: ICD-10-CM

## 2019-05-25 RX ORDER — ELETRIPTAN HYDROBROMIDE 40 MG/1
TABLET, FILM COATED ORAL
Qty: 18 TABLET | Refills: 11 | OUTPATIENT
Start: 2019-05-25 | End: 2019-06-10 | Stop reason: SDUPTHER

## 2019-06-04 ENCOUNTER — PATIENT MESSAGE (OUTPATIENT)
Dept: NEUROLOGY | Facility: CLINIC | Age: 64
End: 2019-06-04

## 2019-06-04 DIAGNOSIS — G43.719 INTRACTABLE CHRONIC MIGRAINE WITHOUT AURA AND WITHOUT STATUS MIGRAINOSUS: ICD-10-CM

## 2019-06-04 RX ORDER — BUTALBITAL, ACETAMINOPHEN, CAFFEINE AND CODEINE PHOSPHATE 300; 50; 40; 30 MG/1; MG/1; MG/1; MG/1
1 CAPSULE ORAL EVERY 4 HOURS
Qty: 10 CAPSULE | Refills: 0 | Status: SHIPPED | OUTPATIENT
Start: 2019-06-04 | End: 2019-06-24 | Stop reason: SDUPTHER

## 2019-06-10 ENCOUNTER — PATIENT MESSAGE (OUTPATIENT)
Dept: NEUROLOGY | Facility: CLINIC | Age: 64
End: 2019-06-10

## 2019-06-10 DIAGNOSIS — G43.719 INTRACTABLE CHRONIC MIGRAINE WITHOUT AURA AND WITHOUT STATUS MIGRAINOSUS: ICD-10-CM

## 2019-06-10 RX ORDER — ELETRIPTAN HYDROBROMIDE 40 MG/1
TABLET, FILM COATED ORAL
Qty: 18 TABLET | Refills: 11 | Status: SHIPPED | OUTPATIENT
Start: 2019-06-10 | End: 2019-06-13 | Stop reason: SDUPTHER

## 2019-06-13 ENCOUNTER — PATIENT MESSAGE (OUTPATIENT)
Dept: NEUROLOGY | Facility: CLINIC | Age: 64
End: 2019-06-13

## 2019-06-13 DIAGNOSIS — G43.719 INTRACTABLE CHRONIC MIGRAINE WITHOUT AURA AND WITHOUT STATUS MIGRAINOSUS: ICD-10-CM

## 2019-06-13 RX ORDER — ELETRIPTAN HYDROBROMIDE 40 MG/1
TABLET, FILM COATED ORAL
Qty: 18 TABLET | Refills: 11 | OUTPATIENT
Start: 2019-06-13 | End: 2019-08-12 | Stop reason: SDUPTHER

## 2019-06-14 ENCOUNTER — PATIENT MESSAGE (OUTPATIENT)
Dept: NEUROLOGY | Facility: CLINIC | Age: 64
End: 2019-06-14

## 2019-06-18 ENCOUNTER — TELEPHONE (OUTPATIENT)
Dept: NEUROLOGY | Facility: CLINIC | Age: 64
End: 2019-06-18

## 2019-06-24 DIAGNOSIS — G43.719 INTRACTABLE CHRONIC MIGRAINE WITHOUT AURA AND WITHOUT STATUS MIGRAINOSUS: ICD-10-CM

## 2019-06-24 RX ORDER — BUTALBITAL, ACETAMINOPHEN, CAFFEINE AND CODEINE PHOSPHATE 300; 50; 40; 30 MG/1; MG/1; MG/1; MG/1
1 CAPSULE ORAL EVERY 4 HOURS
Qty: 10 CAPSULE | Refills: 0 | Status: SHIPPED | OUTPATIENT
Start: 2019-06-24 | End: 2019-11-08

## 2019-07-15 ENCOUNTER — TELEPHONE (OUTPATIENT)
Dept: PHARMACY | Facility: CLINIC | Age: 64
End: 2019-07-15

## 2019-07-15 ENCOUNTER — OFFICE VISIT (OUTPATIENT)
Dept: NEUROLOGY | Facility: CLINIC | Age: 64
End: 2019-07-15
Payer: COMMERCIAL

## 2019-07-15 VITALS
RESPIRATION RATE: 16 BRPM | HEIGHT: 72 IN | HEART RATE: 65 BPM | DIASTOLIC BLOOD PRESSURE: 73 MMHG | SYSTOLIC BLOOD PRESSURE: 118 MMHG | WEIGHT: 217.81 LBS | BODY MASS INDEX: 29.5 KG/M2

## 2019-07-15 DIAGNOSIS — M79.2 NEURALGIA AND NEURITIS: ICD-10-CM

## 2019-07-15 DIAGNOSIS — I10 ESSENTIAL HYPERTENSION: ICD-10-CM

## 2019-07-15 DIAGNOSIS — N28.9 RENAL INSUFFICIENCY: ICD-10-CM

## 2019-07-15 DIAGNOSIS — G47.00 INSOMNIA, UNSPECIFIED TYPE: ICD-10-CM

## 2019-07-15 DIAGNOSIS — F11.90 CHRONIC, CONTINUOUS USE OF OPIOIDS: ICD-10-CM

## 2019-07-15 DIAGNOSIS — K21.9 GASTROESOPHAGEAL REFLUX DISEASE WITHOUT ESOPHAGITIS: ICD-10-CM

## 2019-07-15 DIAGNOSIS — G43.719 INTRACTABLE CHRONIC MIGRAINE WITHOUT AURA AND WITHOUT STATUS MIGRAINOSUS: Primary | ICD-10-CM

## 2019-07-15 DIAGNOSIS — M54.81 BILATERAL OCCIPITAL NEURALGIA: ICD-10-CM

## 2019-07-15 PROCEDURE — 3074F PR MOST RECENT SYSTOLIC BLOOD PRESSURE < 130 MM HG: ICD-10-PCS | Mod: CPTII,S$GLB,, | Performed by: PSYCHIATRY & NEUROLOGY

## 2019-07-15 PROCEDURE — 99215 OFFICE O/P EST HI 40 MIN: CPT | Mod: S$GLB,,, | Performed by: PSYCHIATRY & NEUROLOGY

## 2019-07-15 PROCEDURE — 99215 PR OFFICE/OUTPT VISIT, EST, LEVL V, 40-54 MIN: ICD-10-PCS | Mod: S$GLB,,, | Performed by: PSYCHIATRY & NEUROLOGY

## 2019-07-15 PROCEDURE — 3078F DIAST BP <80 MM HG: CPT | Mod: CPTII,S$GLB,, | Performed by: PSYCHIATRY & NEUROLOGY

## 2019-07-15 PROCEDURE — 3008F PR BODY MASS INDEX (BMI) DOCUMENTED: ICD-10-PCS | Mod: CPTII,S$GLB,, | Performed by: PSYCHIATRY & NEUROLOGY

## 2019-07-15 PROCEDURE — 99999 PR PBB SHADOW E&M-EST. PATIENT-LVL III: CPT | Mod: PBBFAC,,, | Performed by: PSYCHIATRY & NEUROLOGY

## 2019-07-15 PROCEDURE — 3074F SYST BP LT 130 MM HG: CPT | Mod: CPTII,S$GLB,, | Performed by: PSYCHIATRY & NEUROLOGY

## 2019-07-15 PROCEDURE — 3078F PR MOST RECENT DIASTOLIC BLOOD PRESSURE < 80 MM HG: ICD-10-PCS | Mod: CPTII,S$GLB,, | Performed by: PSYCHIATRY & NEUROLOGY

## 2019-07-15 PROCEDURE — 3008F BODY MASS INDEX DOCD: CPT | Mod: CPTII,S$GLB,, | Performed by: PSYCHIATRY & NEUROLOGY

## 2019-07-15 PROCEDURE — 99999 PR PBB SHADOW E&M-EST. PATIENT-LVL III: ICD-10-PCS | Mod: PBBFAC,,, | Performed by: PSYCHIATRY & NEUROLOGY

## 2019-07-15 RX ORDER — ZONISAMIDE 100 MG/1
200 CAPSULE ORAL DAILY
Qty: 180 CAPSULE | Refills: 3 | Status: SHIPPED | OUTPATIENT
Start: 2019-07-15 | End: 2020-09-02 | Stop reason: SDUPTHER

## 2019-07-15 RX ORDER — ALBUTEROL SULFATE 90 UG/1
AEROSOL, METERED RESPIRATORY (INHALATION)
Refills: 0 | COMMUNITY
Start: 2019-06-10 | End: 2020-01-13

## 2019-07-15 NOTE — PROGRESS NOTES
Subjective:       Patient ID: Booker Back is a 62 y.o. male.    Chief Complaint: Headache  INTERVAL HISTORY 7/15/2019  The patient comes for follow up. He has a number of issues to discuss today and comes with a list of questions. His headaches are near daily or daily but he sees an improvement in that, he uses medication BID instead of QID for escalations on a daily basis. He states that recently, he went on a cruise to [place]. His wife forgot her OxyContin. He shared his Oxycodone with her and as a result he did not have enough for himself. The cruise physician gave them a prescription toward the end of the trip. He uses limited amounts of Relpax and Migranal. He is aware of the overall limit of treating escalations 10 days per month. He is on Aimovig 140 mg q28 days and Zonisamide 200 mg daily.    INTERVAL HISTORY 3/26/2019  The patient comes for follow up. He is benefiting from Aimovig, now at 140 mg every 28 days. He also benefited from Cymbalta but when he went to 60 mg he became very sedated. He is aware that he is in medication overuse headache and is perpetually trying to come off it. He would like to try strategies to break the cycle. He is here to discus options    INTERVAL HISTORY  The patient comes for follow up. He is significantly better. After taking Aimovig 70 mg, he reports about 60% overall improvement. He has noted some improvement with Gammacore but the headache tends to recur. He manages escalations starting with Zofran, then either Relpax or Migranal and rescues with Fioricet. He averages 10 Fioricet per month. He is also on Percocet for his back pain. He has not taken Percocet for 4 days in a row, 2 out of those days he had a headache. Last July, he had aphakia OS, ever since he has intermittent conjunctival redness. He was in a  tour this summer and did quite well overall.    HPI    The patient is a pleasant 60 y/o male with long time history of headaches. Over the years he has  "seen neurologists headache specialists and has received expert care. In addition to experiencing tension typt headache and migraine headache, he describes an episode of Ophthalmic Migraine, characterized by loss of vision lasting 2 to 3 hours, not followed by head pain which occurred 10 to 15 years prior to two episodes of Transient Global Amnesia. They happened in 2013 and they were about a month apart. He was admitted to the hospital where imaging was obtained as well as EEG all of which was reportedly negative. He was given the diagnosis of TGA, ex juvantivus. He states that it was after the episodes of TGA when he started having migraine headaches. About 12 years ago he was diagnosed with Medication overuse headache, he was taking Advil 400 mg twice daily. He saw Dr. Nettie Elena who advised him to stop taking Advil. His headaches were resolved but his back pain flared up. His back pain was so severe that he started taking Advil again resulting in headache relapse. In order to treat his back pain he underwent interventional procedures including MBB and RFA without significant relief. When offered a spinal cord stimulator he pursued a second opinion. Instead of having the SCS placed he decided to undergo trans foraminal injections. When done at one level worked about 30% but when given at 3 levels it worked very well. He ascribes a lot of his headaches to "sinus problems." He had 4 headaches last month of October, thus far he has had 4 headaches in November. This increased is attributed to recent sinus surgery. His past medical history is also noteworthy for bariatric surgery, a sleeve stomach reduction which has resulted in profound weight loss. He is overall pleased on his current regime which includes Zonisamide 200 mg daily, Zofran 8 mg prn, Migranal alternating with Relpax and limited doses of Fioricet for rescue. For prevention, he has also tried Gabapentin for 1 month, poorly tolerated. Botox, 3 rounds by Dr." Ky Jensen provided no relief.  He is not a candidate for Topamax because of risk of kidney stones. He cannot take Beta blockers because of history of asthma. He has taken Elavil in the past for reactive depression with no effect on his headaches.   Please see details of headache characteristics headache below.  Headache questionnaire     1. When did your Headaches start?    Age 9     2. Where are your headaches located?   Usually left temple      3. Your headache's characteristics:  Excruciating, Pressure, Throbbing, Pounding, Stabbing, Like a tight band        4. How long does the headache last?  Hours, days     5. How often does the headache occur?  Worst when sinus problems        6. Are your headaches preceded or accompanied by other symptoms? yes  If yes, please describe. Sometimes photophobia, occasional aura        7. Does the headache awaken you at night? no  If so, how often?            8. Please laura the word that best describes your headache's intensity:    moderate        9. Using a scale of 1 through 10, with 0 = no pain and 10 = the worst pain:  What score is your headache now? 0  What score is your headache at its worst? 9  What score is your headache at its best? 1         10. Possible associated headache symptoms:  [x]  Sensitivity to light  [] Dizziness  [x] Nasal or sinus pressure/ pain   [x] Sensitivity to noise  [] Vertigo  [] Problems with concentration  [] Sensitivity to smells  [] Ringing in ears  [] Problems with memory    [] Blurred vision  [x] Irritability  [x] Problems with task completion    [] Double vision  [] Anger  [x]  Problems with relaxation  [x] Loss of appetite  [] Anxiety  [x] Neck tightness, Neck pain  [x] Nausea   [x] Nasal congestion  [] Vomiting           11. Headache improving factors:  [x] Sleep  [] Heat  [x] Darkness  [x] Ice  [x] Local pressure [] Menses (period)  [] Massage   [x] Medications:         12. Headache worsening factors:    [x] Fatigue [x] Sneezing   [x] Changes in Weather  [x] Light [] Bending Over [] Stress  [x] Noise [] Ovulation  [] Multiple Sclerosis Flare-Up  [] Smells  [] Menses  [] Food    [x] Coughing [] Alcohol        13. Number of caffeinated drinks per day: 0        14. Number of diet drinks per day: 0     Review of Systems   Constitutional:  Negative for appetite change, chills, fatigue and fever.   HENT: Negative for dental problem, ear pain, hearing loss, tinnitus, trouble swallowing and voice change.    Eyes: Negative for photophobia, pain and visual disturbance.   Respiratory: Negative for cough, chest tightness and shortness of breath.    Cardiovascular: Negative for chest pain, palpitations and leg swelling.   Gastrointestinal: Negative for abdominal pain, blood in stool, constipation, diarrhea, nausea and vomiting.   Endocrine: Negative for cold intolerance and heat intolerance.   Genitourinary: Negative for difficulty urinating, dysuria and urgency.   Musculoskeletal: Negative for arthralgias, back pain, gait problem, myalgias, neck pain and neck stiffness.   Skin: Negative for color change, pallor and rash.   Neurological: Positive for headaches. Negative for dizziness, tremors, seizures, syncope, facial asymmetry, speech difficulty, weakness, light-headedness and numbness.   Hematological: Negative for adenopathy. Does not bruise/bleed easily.   Psychiatric/Behavioral: Negative for agitation, behavioral problems, confusion, decreased concentration, self-injury, sleep disturbance and suicidal ideas. The patient is not nervous/anxious.          Past Medical History   Diagnosis Date    Abnormal liver enzymes     Anemia     Arthritis     Asthma     Azotemia     Diverticulosis     Esophageal reflux     H/O Nasal MRSA      Hepatitis, unspecified     Hypotension     Infectious mononucleosis     Insomnia     Instability of knee joint     Left Axillary Furuncle 1/26/16     Migraine     Obesity, unspecified     ALPHONSO on CPAP      Other and unspecified hyperlipidemia     Other seborrheic keratosis     Other specified disorder of male genital organs(608.89)     Pulmonary nodule     Renal stone     Seasonal allergic rhinitis     Snoring     Spontaneous ecchymoses     Unspecified essential hypertension     Unspecified vitamin D deficiency     Variants of migraine, not elsewhere classified, without mention of intractable migraine without mention of status migrainosus      Past Surgical History   Procedure Laterality Date    Appendectomy      [place] tooth extraction      Inguinal hernia repair      Wrist fracture surgery Left     Gastric sleeve  11/2012    Radiofrequency ablation       lumbar back    Esophageal dilation       2016    Transformal injections       x 2     Family History   Problem Relation Age of Onset    Hypertension Mother     Stroke Mother     Cancer Father      throat    Alcohol abuse Father     Heart disease Father     Heart attacks under age 50 Father 49     Social History     Social History    Marital status:      Spouse name: N/A    Number of children: N/A    Years of education: N/A     Occupational History    Not on file.     Social History Main Topics    Smoking status: Never Smoker    Smokeless tobacco: Not on file    Alcohol use No    Drug use: No    Sexual activity: Not on file     Other Topics Concern    Not on file     Social History Narrative     Allergies   Allergen Reactions    Tetracyclines Rash       Current Outpatient Medications   Medication Sig    amoxicillin (AMOXIL) 500 MG Tab     butalbital-acetaminophen-caff -40 mg -40 mg Cap Take 1 Tablet by mouth three times a day as needed for migraines    desloratadine (CLARINEX) 5 mg tablet Take 5 mg by mouth once daily.     dexlansoprazole (DEXILANT) 30 mg CpDM Take 2 capsules (60 mg total) by mouth once daily. (Patient taking differently: Take 60 mg by mouth daily as needed. )    dihydroergotamine (MIGRANAL)  0.5 mg/pump act. (4 mg/mL) nasal spray Use in one nostril as directed.  No more than 4 sprays in one hour  Please provide the patient with 90 day supply    DULoxetine (CYMBALTA) 30 MG capsule Take 1 capsule (30 mg total) by mouth once daily.    eletriptan (RELPAX) 40 MG tablet Take 1 tablet by mouth, may repeat in 2 hours if necessary  Allow 18  tablets, a 90 day supply    erenumab-aooe (AIMOVIG AUTOINJECTOR) 70 mg/mL AtIn Inject 140 mg into the skin every 28 days.    eszopiclone (LUNESTA) 3 mg Tab TAKE 1 TABLET EVERY EVENING    ferrous sulfate 325 mg (65 mg iron) Tab tablet Take 325 mg by mouth 2 (two) times daily.     hydroCHLOROthiazide (HYDRODIURIL) 12.5 MG Tab TAKE 1 TABLET ONCE DAILY    HYDROcodone-acetaminophen (NORCO)  mg per tablet     irbesartan (AVAPRO) 150 MG tablet TAKE 1 TABLET ONCE DAILY    LEVALBUTEROL HCL (XOPENEX INHL) Inhale 2 puffs into the lungs continuous prn.    melatonin 10 mg Cap Take 1 capsule by mouth every evening.     montelukast (SINGULAIR) 10 mg tablet     ondansetron (ZOFRAN-ODT) 8 MG TbDL Take 1 tablet (8 mg total) by mouth every 6 (six) hours as needed.    oxycodone-acetaminophen (PERCOCET)  mg per tablet Take 1 tablet by mouth.    oxymetazoline (AFRIN) 0.05 % nasal spray 2 sprays by Nasal route as needed.     polyethylene glycol (GLYCOLAX) 17 gram/dose powder Take 17 g by mouth once daily.    promethazine (PHENERGAN) 25 MG tablet Take 1 tablet (25 mg total) by mouth 2 (two) times daily as needed for Nausea.    QNASL 80 mcg/actuation HFAA 1 spray by Nasal route 2 (two) times daily.     riboflavin, vitamin B2, 400 mg Tab Take by mouth.    tiZANidine (ZANAFLEX) 4 MG tablet TAKE 1 TABLET BY MOUTH THREE TIMES DAILY AS NEEDED    UNABLE TO FIND medication name: lidocaine, prlocaine, lamotrigine, meloxicam cream apply as directed prn    vitamin D 1000 units Tab Take 5,000 Units by mouth once daily. Vitamin d3    vitamin E 100 UNIT capsule Take 400 Units by  mouth 2 (two) times daily.     zonisamide (ZONEGRAN) 100 MG Cap Take 2 capsules (200 mg total) by mouth once daily.     No current facility-administered medications for this visit.          Objective:      Vitals:    07/15/19 0803   BP: 118/73   Pulse: 65   Resp: 16     Body mass index is 29.54 kg/m².    Physical Exam    Constitutional:     He appears well-developed and well-nourished. He is well groomed  HENT:    Head: Atraumatic, oral and nasal mucosa intact  Eyes: Conjunctival hyperemia OS, OD is clear.. Pupils are equal, round, and reactive to light OU  Neck: Neck supple. No thyromegaly present  Cardiovascular: Normal rate and normal heart sounds  No murmur heard  Pulmonary/Chest: Effort normal and breath sounds normal  Skin: Skin is warm and dry  Psychiatric: Normal mood and affect     Neuro exam:  MINI MENTAL ESTATE EXAM 30/30  Mental status:  Awake, attentive, Alert, oriented to self, place, year and month  Language function is intact  Naming, repetition and spontaneous meaningful speech expression are intact  Speech fluency is good and speech is clear  Remote and recent memory are good  Patient able to count backwards by 7    No findings to suggest executive dysfuntion    Patient has adequate insight    Mood is stable    Cranial Nerves:  Smell was not formally evaluated  Cranial Nerves II - XII: intact  Pursuits were smooth, normal saccades, no nystagmus OU  Motor - facial movement was symmetrical and normal     Palate moved well and was symmetrical with normal palatal and oral sensation  Tongue movements were full    Coordination:     Rapid alternating movements and rapid finger tapping - normal bilaterally  Finger to nose - normal and symmetric bilaterally     Motor:  Normal muscle bulk and symmetry. No fasciculations were noted    No pronator drift  Strength 5/5 bilaterally     Reflexes:  Tendon reflexes were 2 + at biceps, triceps, brachioradialis, patellar, and 1+ Achilles bilaterally    Gait: Normal  gait.     Data review:    Lab Results   Component Value Date     09/18/2018    K 3.8 09/18/2018    MG 2.4 07/24/2012     09/18/2018    CO2 27 09/18/2018    BUN 22 (H) 09/18/2018    CREATININE 1.38 09/18/2018     (H) 09/18/2018    HGBA1C 5.8 07/24/2012    AST 17 09/18/2018    ALT 18 09/18/2018    ALBUMIN 4.5 09/18/2018    PROT 7.7 09/18/2018    BILITOT 0.4 09/18/2018    CHOL 189 03/03/2017    HDL 73 03/03/2017    LDLCALC 95.0 03/03/2017    TRIG 105 03/03/2017       Lab Results   Component Value Date    WBC 9.31 09/18/2018    HGB 15.8 09/18/2018    HCT 46.3 09/18/2018    MCV 94 09/18/2018     09/18/2018             Assessment and Plan     Migraine without Aura, chronic. Continue with Zonisamide 200 mg daily and B2 400 mg daily for added prevention. For acute treatment continue with Zofran prn, and Relpax, alternating with Migranal. Rescue with Fioricet (limit #10 per month)  He is on #100 Oxycodone 10 mg by his pain doctor. I am concerned that he tends to refill a bit early. There was a 23 day lapse in June, and he asked me for extra oxycodone when he was given #100 on June 24th, after his cruise. He is n addiction counselor, therefore he is aware of issues regarding addiction. I will defer this to his treating pain specialist, I reiterated to him that he is under a pain contract and prescriptions should only come from one prescriber. I declined to give him additional oxycodone when he asked if I was comfortable adding to what his pain specialist gives him.  D/C Aimovig (no further benefit)  Trial of Emgality  Medication Overuse Headache, has cut down from QID to BID  Continue Gammacore   RTC in 6 months with headache diary    Counseling:  More than 50% of the 25 minute encounter was spent face to face counseling the patient regarding current status and future plan of care as well as side of the medications. I focused on the importance to limit opioids and to observe the contract which  includes controlled substances administered by only one prescriber. All questions were answered to patient's satisfaction        Dana Conn M.D  Medical Director, Headache and Facial Pain  [facility]

## 2019-07-18 NOTE — TELEPHONE ENCOUNTER
DOCUMENTATION ONLY:  Prior authorization for Emgality approved from 6/18/19 to 1/4/20    Co-pay: $338.76- $0 with Emgality Yung    Patient Assistance is not required. Forwarding to clinical pharmacist for consult and shipment.  CARYL

## 2019-07-27 ENCOUNTER — LAB VISIT (OUTPATIENT)
Dept: LAB | Facility: HOSPITAL | Age: 64
End: 2019-07-27
Attending: INTERNAL MEDICINE
Payer: COMMERCIAL

## 2019-07-27 DIAGNOSIS — E78.49 OTHER HYPERLIPIDEMIA: ICD-10-CM

## 2019-07-27 DIAGNOSIS — R73.9 HYPERGLYCEMIA: ICD-10-CM

## 2019-07-27 DIAGNOSIS — I10 ESSENTIAL HYPERTENSION, MALIGNANT: Primary | ICD-10-CM

## 2019-07-27 DIAGNOSIS — E55.9 AVITAMINOSIS D: ICD-10-CM

## 2019-07-27 LAB
25(OH)D3+25(OH)D2 SERPL-MCNC: 50 NG/ML (ref 30–96)
ALBUMIN SERPL BCP-MCNC: 3.5 G/DL (ref 3.5–5.2)
ALP SERPL-CCNC: 97 U/L (ref 55–135)
ALT SERPL W/O P-5'-P-CCNC: 15 U/L (ref 10–44)
ANION GAP SERPL CALC-SCNC: 9 MMOL/L (ref 8–16)
AST SERPL-CCNC: 20 U/L (ref 10–40)
BASOPHILS # BLD AUTO: 0.1 K/UL (ref 0–0.2)
BASOPHILS NFR BLD: 1.3 % (ref 0–1.9)
BILIRUB SERPL-MCNC: 0.3 MG/DL (ref 0.1–1)
BUN SERPL-MCNC: 31 MG/DL (ref 8–23)
CALCIUM SERPL-MCNC: 10.1 MG/DL (ref 8.7–10.5)
CHLORIDE SERPL-SCNC: 102 MMOL/L (ref 95–110)
CHOLEST SERPL-MCNC: 197 MG/DL (ref 120–199)
CHOLEST/HDLC SERPL: 2.8 {RATIO} (ref 2–5)
CO2 SERPL-SCNC: 28 MMOL/L (ref 23–29)
CREAT SERPL-MCNC: 1.3 MG/DL (ref 0.5–1.4)
DIFFERENTIAL METHOD: ABNORMAL
EOSINOPHIL # BLD AUTO: 0.5 K/UL (ref 0–0.5)
EOSINOPHIL NFR BLD: 6.5 % (ref 0–8)
ERYTHROCYTE [DISTWIDTH] IN BLOOD BY AUTOMATED COUNT: 13.3 % (ref 11.5–14.5)
EST. GFR  (AFRICAN AMERICAN): >60 ML/MIN/1.73 M^2
EST. GFR  (NON AFRICAN AMERICAN): 57.7 ML/MIN/1.73 M^2
ESTIMATED AVG GLUCOSE: 105 MG/DL (ref 68–131)
GLUCOSE SERPL-MCNC: 87 MG/DL (ref 70–110)
HBA1C MFR BLD HPLC: 5.3 % (ref 4–5.6)
HCT VFR BLD AUTO: 36 % (ref 40–54)
HDLC SERPL-MCNC: 70 MG/DL (ref 40–75)
HDLC SERPL: 35.5 % (ref 20–50)
HGB BLD-MCNC: 11.8 G/DL (ref 14–18)
IMM GRANULOCYTES # BLD AUTO: 0.04 K/UL (ref 0–0.04)
IMM GRANULOCYTES NFR BLD AUTO: 0.5 % (ref 0–0.5)
LDLC SERPL CALC-MCNC: 104.4 MG/DL (ref 63–159)
LYMPHOCYTES # BLD AUTO: 2.4 K/UL (ref 1–4.8)
LYMPHOCYTES NFR BLD: 30.8 % (ref 18–48)
MCH RBC QN AUTO: 32.5 PG (ref 27–31)
MCHC RBC AUTO-ENTMCNC: 32.8 G/DL (ref 32–36)
MCV RBC AUTO: 99 FL (ref 82–98)
MONOCYTES # BLD AUTO: 0.6 K/UL (ref 0.3–1)
MONOCYTES NFR BLD: 7.6 % (ref 4–15)
NEUTROPHILS # BLD AUTO: 4.1 K/UL (ref 1.8–7.7)
NEUTROPHILS NFR BLD: 53.3 % (ref 38–73)
NONHDLC SERPL-MCNC: 127 MG/DL
NRBC BLD-RTO: 0 /100 WBC
PLATELET # BLD AUTO: 320 K/UL (ref 150–350)
PMV BLD AUTO: 9.3 FL (ref 9.2–12.9)
POTASSIUM SERPL-SCNC: 4.6 MMOL/L (ref 3.5–5.1)
PROT SERPL-MCNC: 7 G/DL (ref 6–8.4)
RBC # BLD AUTO: 3.63 M/UL (ref 4.6–6.2)
SODIUM SERPL-SCNC: 139 MMOL/L (ref 136–145)
TRIGL SERPL-MCNC: 113 MG/DL (ref 30–150)
WBC # BLD AUTO: 7.67 K/UL (ref 3.9–12.7)

## 2019-07-27 PROCEDURE — 85025 COMPLETE CBC W/AUTO DIFF WBC: CPT

## 2019-07-27 PROCEDURE — 80053 COMPREHEN METABOLIC PANEL: CPT

## 2019-07-27 PROCEDURE — 83036 HEMOGLOBIN GLYCOSYLATED A1C: CPT

## 2019-07-27 PROCEDURE — 80061 LIPID PANEL: CPT

## 2019-07-27 PROCEDURE — 82306 VITAMIN D 25 HYDROXY: CPT

## 2019-07-27 PROCEDURE — 36415 COLL VENOUS BLD VENIPUNCTURE: CPT | Mod: PO

## 2019-07-31 ENCOUNTER — TELEPHONE (OUTPATIENT)
Dept: PHARMACY | Facility: CLINIC | Age: 64
End: 2019-07-31

## 2019-07-31 NOTE — TELEPHONE ENCOUNTER
Initial Emgality consult completed on  . Emgality 240mg (loading dose) will be shipped on  to arrive at patient's work on  via [employer]. $0.00 copay. Patient intends to start Emgality on . Address confirmed. Confirmed 2 patient identifiers - name and . Therapy Appropriate.    Patient declined the historical consult, as he is changing from Aimovig to Emgality. He has had reduced effects from Aimovig. 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 Also had questions regarding it's mechanism of action.    Indication: Migraine prophlaxis  goals of treatment: reduction in migraine frequency, intensity, and/or duration.     --Injection experience: Has tried and failed Aimovig  Informed patient on online injection video on  website.      Store in refrigerator prior to use (do not freeze, do not shake, keep in original box until use).    Counseled patient on administration directions:  - Inject two injections (240mg) into the skin as a loading dose, followed by one injection (120mg) every 4 weeks thereafter.   - Take out of the refrigerator 30 minutes prior to injection to reach room temperature.  - Monthly RX will come with gauze, band aids, and alcohol swabs.  - Patient may self-inject in either the front/top of the thighs, abdomen- but at least 2 inches away from belly button   - If someone else is giving the injection, they may also use the outer part of your upper arm or your buttocks.   - If injecting 2 pens for the loading dose, use 2 different injection sites.   - Patient was instructed to rotate injections sites monthly.  - Inspect medication - should be clear and colorless to slightly yellow to slightly brown.   - Remove white base cap from pen (twist to remove).  - Place the pen flat against the injection site and turn the lock ring to the unlocked position then push down and hold the teal button - there will be an initial click;  in 10 seconds you will hear a second click.  - Remove the pen from skin and check to see if the gray plunger is visible - this will indicate that the injection is complete.     Patient was counseled on possible side effects:  - Injection site reaction: redness, soreness, itching, bruising, which should resolve within 3-5 days.  - Allergic reactions: itching, rash, hives, swelling of face, mouth, tongue, throat, trouble breathing.   - Informed that there is no data in pregnancy/breastfeeding.     Consultation included the importance of compliance and of keeping all follow up appointments.  Patient understands to report any medication changes to [facility] and provider. All questions answered and addressed to patients satisfaction. RPh will touchbase with pt in 7 days and [facility] will contact patient in 3 weeks for refills.    Nikita Biswas, PharmD  Clinical Pharmacist  [facility]  P: [phone]    [facility] sent 7/31 at 3:53PM

## 2019-08-11 ENCOUNTER — PATIENT MESSAGE (OUTPATIENT)
Dept: NEUROLOGY | Facility: CLINIC | Age: 64
End: 2019-08-11

## 2019-08-12 DIAGNOSIS — G43.719 INTRACTABLE CHRONIC MIGRAINE WITHOUT AURA AND WITHOUT STATUS MIGRAINOSUS: ICD-10-CM

## 2019-08-12 RX ORDER — BUTALBITAL, ACETAMINOPHEN, CAFFEINE AND CODEINE PHOSPHATE 300; 50; 40; 30 MG/1; MG/1; MG/1; MG/1
1 CAPSULE ORAL EVERY 4 HOURS
Qty: 10 CAPSULE | Refills: 0 | OUTPATIENT
Start: 2019-08-12

## 2019-08-12 RX ORDER — ELETRIPTAN HYDROBROMIDE 40 MG/1
TABLET, FILM COATED ORAL
Qty: 18 TABLET | Refills: 11 | Status: SHIPPED | OUTPATIENT
Start: 2019-08-12 | End: 2019-08-16 | Stop reason: SDUPTHER

## 2019-08-13 NOTE — TELEPHONE ENCOUNTER
Emgality Post Start follow up.  Start date confirmed 8/2/19.  Patient self injected Emgality 240mg loading dose without any difficulties.  He reports a change in quality of headache and fewer headaches when awake.  Patient reports experiencing no side effects since beginning of  therapy.  He is aware maintenance dose is 120mg.  RTS 8/21/19.  [facility] will continue to reach out to patient monthly to arrange refills.

## 2019-08-15 ENCOUNTER — PATIENT MESSAGE (OUTPATIENT)
Dept: NEUROLOGY | Facility: CLINIC | Age: 64
End: 2019-08-15

## 2019-08-16 DIAGNOSIS — G43.719 INTRACTABLE CHRONIC MIGRAINE WITHOUT AURA AND WITHOUT STATUS MIGRAINOSUS: ICD-10-CM

## 2019-08-16 RX ORDER — ELETRIPTAN HYDROBROMIDE 40 MG/1
TABLET, FILM COATED ORAL
Qty: 18 TABLET | Refills: 11 | OUTPATIENT
Start: 2019-08-16 | End: 2020-05-15

## 2019-08-19 PROBLEM — G89.29 CHRONIC LOW BACK PAIN: Status: ACTIVE | Noted: 2019-08-19

## 2019-08-19 PROBLEM — M54.50 CHRONIC LOW BACK PAIN: Status: ACTIVE | Noted: 2019-08-19

## 2019-08-21 ENCOUNTER — TELEPHONE (OUTPATIENT)
Dept: PHARMACY | Facility: CLINIC | Age: 64
End: 2019-08-21

## 2019-08-27 ENCOUNTER — PATIENT MESSAGE (OUTPATIENT)
Dept: NEUROLOGY | Facility: CLINIC | Age: 64
End: 2019-08-27

## 2019-09-16 DIAGNOSIS — G43.711 CHRONIC MIGRAINE WITHOUT AURA, WITH INTRACTABLE MIGRAINE, SO STATED, WITH STATUS MIGRAINOSUS: ICD-10-CM

## 2019-09-16 RX ORDER — ONDANSETRON 8 MG/1
TABLET, ORALLY DISINTEGRATING ORAL
Qty: 60 TABLET | Refills: 0 | Status: SHIPPED | OUTPATIENT
Start: 2019-09-16 | End: 2020-03-23

## 2019-09-18 ENCOUNTER — TELEPHONE (OUTPATIENT)
Dept: PHARMACY | Facility: CLINIC | Age: 64
End: 2019-09-18

## 2019-10-21 ENCOUNTER — TELEPHONE (OUTPATIENT)
Dept: PHARMACY | Facility: CLINIC | Age: 64
End: 2019-10-21

## 2019-10-21 NOTE — TELEPHONE ENCOUNTER
Call attempt 1 for Emgality refill and to follow up on bruising - LVM and MyChart message sent. Copay $0 at 004.    Torres Kinney, PharmD  Clinical Pharmacist   [facility]   P: [phone]

## 2019-12-09 ENCOUNTER — PATIENT MESSAGE (OUTPATIENT)
Dept: NEUROLOGY | Facility: CLINIC | Age: 64
End: 2019-12-09

## 2019-12-09 RX ORDER — DIHYDROERGOTAMINE MESYLATE 4 MG/ML
SPRAY NASAL
Qty: 24 ML | Refills: 3 | OUTPATIENT
Start: 2019-12-09 | End: 2020-12-31 | Stop reason: SDUPTHER

## 2019-12-10 ENCOUNTER — TELEPHONE (OUTPATIENT)
Dept: NEUROLOGY | Facility: CLINIC | Age: 64
End: 2019-12-10

## 2019-12-11 ENCOUNTER — PATIENT MESSAGE (OUTPATIENT)
Dept: NEUROLOGY | Facility: CLINIC | Age: 64
End: 2019-12-11

## 2019-12-11 ENCOUNTER — HOSPITAL ENCOUNTER (OUTPATIENT)
Dept: RADIOLOGY | Facility: HOSPITAL | Age: 64
Discharge: HOME | End: 2019-12-11
Attending: NURSE PRACTITIONER
Payer: COMMERCIAL

## 2019-12-11 ENCOUNTER — OFFICE VISIT (OUTPATIENT)
Dept: ORTHOPEDICS | Facility: CLINIC | Age: 64
End: 2019-12-11
Payer: COMMERCIAL

## 2019-12-11 VITALS
HEART RATE: 65 BPM | BODY MASS INDEX: 29.8 KG/M2 | WEIGHT: 220 LBS | HEIGHT: 72 IN | SYSTOLIC BLOOD PRESSURE: 154 MMHG | DIASTOLIC BLOOD PRESSURE: 87 MMHG

## 2019-12-11 DIAGNOSIS — M25.532 LEFT WRIST PAIN: ICD-10-CM

## 2019-12-11 DIAGNOSIS — M25.532 LEFT WRIST PAIN: Primary | ICD-10-CM

## 2019-12-11 DIAGNOSIS — M19.032 ARTHRITIS OF LEFT WRIST: ICD-10-CM

## 2019-12-11 PROCEDURE — 99213 OFFICE O/P EST LOW 20 MIN: CPT | Mod: S$GLB,,, | Performed by: ORTHOPAEDIC SURGERY

## 2019-12-11 PROCEDURE — 3008F PR BODY MASS INDEX (BMI) DOCUMENTED: ICD-10-PCS | Mod: CPTII,S$GLB,, | Performed by: ORTHOPAEDIC SURGERY

## 2019-12-11 PROCEDURE — 99213 PR OFFICE/OUTPT VISIT, EST, LEVL III, 20-29 MIN: ICD-10-PCS | Mod: S$GLB,,, | Performed by: ORTHOPAEDIC SURGERY

## 2019-12-11 PROCEDURE — 3077F SYST BP >= 140 MM HG: CPT | Mod: CPTII,S$GLB,, | Performed by: ORTHOPAEDIC SURGERY

## 2019-12-11 PROCEDURE — 3077F PR MOST RECENT SYSTOLIC BLOOD PRESSURE >= 140 MM HG: ICD-10-PCS | Mod: CPTII,S$GLB,, | Performed by: ORTHOPAEDIC SURGERY

## 2019-12-11 PROCEDURE — 99999 PR PBB SHADOW E&M-EST. PATIENT-LVL III: CPT | Mod: PBBFAC,,, | Performed by: ORTHOPAEDIC SURGERY

## 2019-12-11 PROCEDURE — 3079F DIAST BP 80-89 MM HG: CPT | Mod: CPTII,S$GLB,, | Performed by: ORTHOPAEDIC SURGERY

## 2019-12-11 PROCEDURE — 73110 X-RAY EXAM OF WRIST: CPT | Mod: 26,LT,, | Performed by: RADIOLOGY

## 2019-12-11 PROCEDURE — 1101F PT FALLS ASSESS-DOCD LE1/YR: CPT | Mod: CPTII,S$GLB,, | Performed by: ORTHOPAEDIC SURGERY

## 2019-12-11 PROCEDURE — 3008F BODY MASS INDEX DOCD: CPT | Mod: CPTII,S$GLB,, | Performed by: ORTHOPAEDIC SURGERY

## 2019-12-11 PROCEDURE — 73110 X-RAY EXAM OF WRIST: CPT | Mod: TC,PO,LT

## 2019-12-11 PROCEDURE — 99999 PR PBB SHADOW E&M-EST. PATIENT-LVL III: ICD-10-PCS | Mod: PBBFAC,,, | Performed by: ORTHOPAEDIC SURGERY

## 2019-12-11 PROCEDURE — 1101F PR PT FALLS ASSESS DOC 0-1 FALLS W/OUT INJ PAST YR: ICD-10-PCS | Mod: CPTII,S$GLB,, | Performed by: ORTHOPAEDIC SURGERY

## 2019-12-11 PROCEDURE — 73110 XR WRIST COMPLETE 3 VIEWS LEFT: ICD-10-PCS | Mod: 26,LT,, | Performed by: RADIOLOGY

## 2019-12-11 PROCEDURE — 3079F PR MOST RECENT DIASTOLIC BLOOD PRESSURE 80-89 MM HG: ICD-10-PCS | Mod: CPTII,S$GLB,, | Performed by: ORTHOPAEDIC SURGERY

## 2019-12-12 NOTE — PROGRESS NOTES
Mr Back returns to clinic.  He has a new complaint of left wrist pain. He does have a remote history of a distal radius fracture which healed in a malunited position. He states that he is also experiencing some limitation of motion. He is here today for further evaluation.    Physical exam:  Examination the left wrist and hand reveals that there is no edema.  There are changes consistent with arthritis about the wrist.  Palpation over the radial carpal joint does produce pain.  Wrist range of motion is limited with extension of 30° and flexion of 50°.  He has full pronation and supination.  There is no significant instability about the wrist. He does have a 2+ radial pulse.  Sensation is grossly intact in the median radial and ulnar distributions.    Radiology:  X-rays of the left wrist were taken in clinic today. He is noted to have a malunion of the radius as well as significant radiocarpal arthritis.    Assessment:  Left wrist arthritis.    Plan:    1.  I discussed with the patient treatment options.  I discussed anti-inflammatories, steroid injections, and fusion of the wrist. He states that he does have anti-inflammatories which have been prescribed for his back and he will intermittently take them. He does not want to undergo a wrist injection at this time.  States that he he also is not interested in surgery at this time    2.  Will follow up with me on a p.r.n. basis

## 2020-01-20 ENCOUNTER — OFFICE VISIT (OUTPATIENT)
Dept: NEUROLOGY | Facility: CLINIC | Age: 65
End: 2020-01-20
Payer: COMMERCIAL

## 2020-01-20 VITALS
RESPIRATION RATE: 16 BRPM | HEIGHT: 72 IN | DIASTOLIC BLOOD PRESSURE: 78 MMHG | WEIGHT: 218.25 LBS | HEART RATE: 51 BPM | BODY MASS INDEX: 29.56 KG/M2 | SYSTOLIC BLOOD PRESSURE: 117 MMHG

## 2020-01-20 DIAGNOSIS — Z98.84 HISTORY OF BARIATRIC SURGERY: ICD-10-CM

## 2020-01-20 DIAGNOSIS — M54.81 BILATERAL OCCIPITAL NEURALGIA: ICD-10-CM

## 2020-01-20 DIAGNOSIS — I10 ESSENTIAL HYPERTENSION: ICD-10-CM

## 2020-01-20 DIAGNOSIS — K21.9 GASTROESOPHAGEAL REFLUX DISEASE WITHOUT ESOPHAGITIS: ICD-10-CM

## 2020-01-20 DIAGNOSIS — G47.00 INSOMNIA, UNSPECIFIED TYPE: ICD-10-CM

## 2020-01-20 DIAGNOSIS — G43.719 INTRACTABLE CHRONIC MIGRAINE WITHOUT AURA AND WITHOUT STATUS MIGRAINOSUS: Primary | ICD-10-CM

## 2020-01-20 DIAGNOSIS — M79.2 NEURALGIA AND NEURITIS: ICD-10-CM

## 2020-01-20 PROCEDURE — 3074F PR MOST RECENT SYSTOLIC BLOOD PRESSURE < 130 MM HG: ICD-10-PCS | Mod: CPTII,S$GLB,, | Performed by: PSYCHIATRY & NEUROLOGY

## 2020-01-20 PROCEDURE — 99999 PR PBB SHADOW E&M-EST. PATIENT-LVL IV: CPT | Mod: PBBFAC,,, | Performed by: PSYCHIATRY & NEUROLOGY

## 2020-01-20 PROCEDURE — 3008F PR BODY MASS INDEX (BMI) DOCUMENTED: ICD-10-PCS | Mod: CPTII,S$GLB,, | Performed by: PSYCHIATRY & NEUROLOGY

## 2020-01-20 PROCEDURE — 3078F PR MOST RECENT DIASTOLIC BLOOD PRESSURE < 80 MM HG: ICD-10-PCS | Mod: CPTII,S$GLB,, | Performed by: PSYCHIATRY & NEUROLOGY

## 2020-01-20 PROCEDURE — 99214 OFFICE O/P EST MOD 30 MIN: CPT | Mod: S$GLB,,, | Performed by: PSYCHIATRY & NEUROLOGY

## 2020-01-20 PROCEDURE — 3074F SYST BP LT 130 MM HG: CPT | Mod: CPTII,S$GLB,, | Performed by: PSYCHIATRY & NEUROLOGY

## 2020-01-20 PROCEDURE — 1101F PR PT FALLS ASSESS DOC 0-1 FALLS W/OUT INJ PAST YR: ICD-10-PCS | Mod: CPTII,S$GLB,, | Performed by: PSYCHIATRY & NEUROLOGY

## 2020-01-20 PROCEDURE — 3008F BODY MASS INDEX DOCD: CPT | Mod: CPTII,S$GLB,, | Performed by: PSYCHIATRY & NEUROLOGY

## 2020-01-20 PROCEDURE — 99214 PR OFFICE/OUTPT VISIT, EST, LEVL IV, 30-39 MIN: ICD-10-PCS | Mod: S$GLB,,, | Performed by: PSYCHIATRY & NEUROLOGY

## 2020-01-20 PROCEDURE — 99999 PR PBB SHADOW E&M-EST. PATIENT-LVL IV: ICD-10-PCS | Mod: PBBFAC,,, | Performed by: PSYCHIATRY & NEUROLOGY

## 2020-01-20 PROCEDURE — 1101F PT FALLS ASSESS-DOCD LE1/YR: CPT | Mod: CPTII,S$GLB,, | Performed by: PSYCHIATRY & NEUROLOGY

## 2020-01-20 PROCEDURE — 3078F DIAST BP <80 MM HG: CPT | Mod: CPTII,S$GLB,, | Performed by: PSYCHIATRY & NEUROLOGY

## 2020-01-20 NOTE — PROGRESS NOTES
Subjective:       Patient ID: Booker Back is a 62 y.o. male.    Chief Complaint: Headache    INTERVAL HISTORY 1/20/2020  The patient comes for follow up. He is stable. He has tried Emgality for 4 months and is beginning to see an improvement. He went to [place] over Paulo, forgot his Oxycodone and obtained #9 tabs from the ED to hold him until back home. Not taken Butalbital for 4 months. He is here to discuss treatment optimization. Otherwise information below is still accurate and current.      INTERVAL HISTORY 7/15/2019  The patient comes for follow up. He has a number of issues to discuss today and comes with a list of questions. His headaches are near daily or daily but he sees an improvement in that, he uses medication BID instead of QID for escalations on a daily basis. He states that recently, he went on a cruise to [place]. His wife forgot her OxyContin. He shared his Oxycodone with her and as a result he did not have enough for himself. The cruise physician gave them a prescription toward the end of the trip. He uses limited amounts of Relpax and Migranal. He is aware of the overall limit of treating escalations 10 days per month. He is on Aimovig 140 mg q28 days and Zonisamide 200 mg daily.    INTERVAL HISTORY 3/26/2019  The patient comes for follow up. He is benefiting from Aimovig, now at 140 mg every 28 days. He also benefited from Cymbalta but when he went to 60 mg he became very sedated. He is aware that he is in medication overuse headache and is perpetually trying to come off it. He would like to try strategies to break the cycle. He is here to discus options    INTERVAL HISTORY  The patient comes for follow up. He is significantly better. After taking Aimovig 70 mg, he reports about 60% overall improvement. He has noted some improvement with Gammacore but the headache tends to recur. He manages escalations starting with Zofran, then either Relpax or Migranal and rescues with Fioricet. He  "averages 10 Fioricet per month. He is also on Percocet for his back pain. He has not taken Percocet for 4 days in a row, 2 out of those days he had a headache. Last July, he had aphakia OS, ever since he has intermittent conjunctival redness. He was in a  tour this summer and did quite well overall.    HPI    The patient is a pleasant 62 y/o male with long time history of headaches. Over the years he has seen neurologists headache specialists and has received expert care. In addition to experiencing tension typt headache and migraine headache, he describes an episode of Ophthalmic Migraine, characterized by loss of vision lasting 2 to 3 hours, not followed by head pain which occurred 10 to 15 years prior to two episodes of Transient [employer] Amnesia. They happened in 2013 and they were about a month apart. He was admitted to the hospital where imaging was obtained as well as EEG all of which was reportedly negative. He was given the diagnosis of TGA, ex juvantivus. He states that it was after the episodes of TGA when he started having migraine headaches. About 12 years ago he was diagnosed with Medication overuse headache, he was taking Advil 400 mg twice daily. He saw Dr. Nettie Elena who advised him to stop taking Advil. His headaches were resolved but his back pain flared up. His back pain was so severe that he started taking Advil again resulting in headache relapse. In order to treat his back pain he underwent interventional procedures including MBB and RFA without significant relief. When offered a spinal cord stimulator he pursued a second opinion. Instead of having the SCS placed he decided to undergo trans foraminal injections. When done at one level worked about 30% but when given at 3 levels it worked very well. He ascribes a lot of his headaches to "sinus problems." He had 4 headaches last month of October, thus far he has had 4 headaches in November. This increased is attributed to recent sinus " surgery. His past medical history is also noteworthy for bariatric surgery, a sleeve stomach reduction which has resulted in profound weight loss. He is overall pleased on his current regime which includes Zonisamide 200 mg daily, Zofran 8 mg prn, Migranal alternating with Relpax and limited doses of Fioricet for rescue. For prevention, he has also tried Gabapentin for 1 month, poorly tolerated. Botox, 3 rounds by Dr. Ky Jensen provided no relief.  He is not a candidate for Topamax because of risk of kidney stones. He cannot take Beta blockers because of history of asthma. He has taken Elavil in the past for reactive depression with no effect on his headaches.   Please see details of headache characteristics headache below.  Headache questionnaire     1. When did your Headaches start?    Age 9     2. Where are your headaches located?   Usually left temple      3. Your headache's characteristics:  Excruciating, Pressure, Throbbing, Pounding, Stabbing, Like a tight band        4. How long does the headache last?  Hours, days     5. How often does the headache occur?  Worst when sinus problems        6. Are your headaches preceded or accompanied by other symptoms? yes  If yes, please describe. Sometimes photophobia, occasional aura        7. Does the headache awaken you at night? no  If so, how often?            8. Please laura the word that best describes your headache's intensity:    moderate        9. Using a scale of 1 through 10, with 0 = no pain and 10 = the worst pain:  What score is your headache now? 0  What score is your headache at its worst? 9  What score is your headache at its best? 1         10. Possible associated headache symptoms:  [x]  Sensitivity to light  [] Dizziness  [x] Nasal or sinus pressure/ pain   [x] Sensitivity to noise  [] Vertigo  [] Problems with concentration  [] Sensitivity to smells  [] Ringing in ears  [] Problems with memory    [] Blurred vision  [x] Irritability  [x]  Problems with task completion    [] Double vision  [] Anger  [x]  Problems with relaxation  [x] Loss of appetite  [] Anxiety  [x] Neck tightness, Neck pain  [x] Nausea   [x] Nasal congestion  [] Vomiting           11. Headache improving factors:  [x] Sleep  [] Heat  [x] Darkness  [x] Ice  [x] Local pressure [] Menses (period)  [] Massage   [x] Medications:         12. Headache worsening factors:    [x] Fatigue [x] Sneezing  [x] Changes in Weather  [x] Light [] Bending Over [] Stress  [x] Noise [] Ovulation  [] Multiple Sclerosis Flare-Up  [] Smells  [] Menses  [] Food    [x] Coughing [] Alcohol        13. Number of caffeinated drinks per day: 0        14. Number of diet drinks per day: 0     Review of Systems   Constitutional:  Negative for appetite change, chills, fatigue and fever.   HENT: Negative for dental problem, ear pain, hearing loss, tinnitus, trouble swallowing and voice change.    Eyes: Negative for photophobia, pain and visual disturbance.   Respiratory: Negative for cough, chest tightness and shortness of breath.    Cardiovascular: Negative for chest pain, palpitations and leg swelling.   Gastrointestinal: Negative for abdominal pain, blood in stool, constipation, diarrhea, nausea and vomiting.   Endocrine: Negative for cold intolerance and heat intolerance.   Genitourinary: Negative for difficulty urinating, dysuria and urgency.   Musculoskeletal: Negative for arthralgias, back pain, gait problem, myalgias, neck pain and neck stiffness.   Skin: Negative for color change, pallor and rash.   Neurological: Positive for headaches. Negative for dizziness, tremors, seizures, syncope, facial asymmetry, speech difficulty, weakness, light-headedness and numbness.   Hematological: Negative for adenopathy. Does not bruise/bleed easily.   Psychiatric/Behavioral: Negative for agitation, behavioral problems, confusion, decreased concentration, self-injury, sleep disturbance and suicidal ideas. The patient is not  nervous/anxious.          Past Medical History   Diagnosis Date    Abnormal liver enzymes     Anemia     Arthritis     Asthma     Azotemia     Diverticulosis     Esophageal reflux     H/O Nasal MRSA      Hepatitis, unspecified     Hypotension     Infectious mononucleosis     Insomnia     Instability of knee joint     Left Axillary Furuncle 1/26/16     Migraine     Obesity, unspecified     ALPHONSO on CPAP     Other and unspecified hyperlipidemia     Other seborrheic keratosis     Other specified disorder of male genital organs(608.89)     Pulmonary nodule     Renal stone     Seasonal allergic rhinitis     Snoring     Spontaneous ecchymoses     Unspecified essential hypertension     Unspecified vitamin D deficiency     Variants of migraine, not elsewhere classified, without mention of intractable migraine without mention of status migrainosus      Past Surgical History   Procedure Laterality Date    Appendectomy      [place] tooth extraction      Inguinal hernia repair      Wrist fracture surgery Left     Gastric sleeve  11/2012    Radiofrequency ablation       lumbar back    Esophageal dilation       2016    Transformal injections       x 2     Family History   Problem Relation Age of Onset    Hypertension Mother     Stroke Mother     Cancer Father      throat    Alcohol abuse Father     Heart disease Father     Heart attacks under age 50 Father 49     Social History     Social History    Marital status:      Spouse name: N/A    Number of children: N/A    Years of education: N/A     Occupational History    Not on file.     Social History Main Topics    Smoking status: Never Smoker    Smokeless tobacco: Not on file    Alcohol use No    Drug use: No    Sexual activity: Not on file     Other Topics Concern    Not on file     Social History Narrative     Allergies   Allergen Reactions    Tetracyclines Rash       Current Outpatient Medications   Medication Sig     amoxicillin (AMOXIL) 500 MG Tab     butalbital-acetaminophen-caff -40 mg -40 mg Cap Take 1 Tablet by mouth three times a day as needed for migraines    desloratadine (CLARINEX) 5 mg tablet Take 5 mg by mouth once daily.     dexlansoprazole (DEXILANT) 30 mg CpDM Take 2 capsules (60 mg total) by mouth once daily. (Patient taking differently: Take 60 mg by mouth daily as needed. )    dihydroergotamine (MIGRANAL) 0.5 mg/pump act. (4 mg/mL) nasal spray Use in one nostril as directed.  No more than 4 sprays in one hour  Please provide the patient with 90 day supply    DULoxetine (CYMBALTA) 30 MG capsule Take 1 capsule (30 mg total) by mouth once daily.    eletriptan (RELPAX) 40 MG tablet Take 1 tablet by mouth, may repeat in 2 hours if necessary  Allow 18  tablets, a 90 day supply    erenumab-aooe (AIMOVIG AUTOINJECTOR) 70 mg/mL AtIn Inject 140 mg into the skin every 28 days.    eszopiclone (LUNESTA) 3 mg Tab TAKE 1 TABLET EVERY EVENING    ferrous sulfate 325 mg (65 mg iron) Tab tablet Take 325 mg by mouth 2 (two) times daily.     hydroCHLOROthiazide (HYDRODIURIL) 12.5 MG Tab TAKE 1 TABLET ONCE DAILY    HYDROcodone-acetaminophen (NORCO)  mg per tablet     irbesartan (AVAPRO) 150 MG tablet TAKE 1 TABLET ONCE DAILY    LEVALBUTEROL HCL (XOPENEX INHL) Inhale 2 puffs into the lungs continuous prn.    melatonin 10 mg Cap Take 1 capsule by mouth every evening.     montelukast (SINGULAIR) 10 mg tablet     ondansetron (ZOFRAN-ODT) 8 MG TbDL Take 1 tablet (8 mg total) by mouth every 6 (six) hours as needed.    oxycodone-acetaminophen (PERCOCET)  mg per tablet Take 1 tablet by mouth.    oxymetazoline (AFRIN) 0.05 % nasal spray 2 sprays by Nasal route as needed.     polyethylene glycol (GLYCOLAX) 17 gram/dose powder Take 17 g by mouth once daily.    promethazine (PHENERGAN) 25 MG tablet Take 1 tablet (25 mg total) by mouth 2 (two) times daily as needed for Nausea.    QNASL 80 mcg/actuation  HFAA 1 spray by Nasal route 2 (two) times daily.     riboflavin, vitamin B2, 400 mg Tab Take by mouth.    tiZANidine (ZANAFLEX) 4 MG tablet TAKE 1 TABLET BY MOUTH THREE TIMES DAILY AS NEEDED    UNABLE TO FIND medication name: lidocaine, prlocaine, lamotrigine, meloxicam cream apply as directed prn    vitamin D 1000 units Tab Take 5,000 Units by mouth once daily. Vitamin d3    vitamin E 100 UNIT capsule Take 400 Units by mouth 2 (two) times daily.     zonisamide (ZONEGRAN) 100 MG Cap Take 2 capsules (200 mg total) by mouth once daily.     No current facility-administered medications for this visit.          Objective:      Vitals:    01/20/20 0808   BP: 117/78   Pulse: (!) 51   Resp: 16     Body mass index is 29.6 kg/m².      Physical Exam    Constitutional:   Neurological Exam:  General: well-developed, well-nourished, no distress  Mental status: Awake and alert  Speech language: No dysarthria or aphasia on conversation  Cranial nerves: Face symmetric  Motor: Moves all extremities well  Coordination: No ataxia. No tremor.     Data review:    Lab Results   Component Value Date     07/27/2019    K 4.6 07/27/2019    MG 2.4 07/24/2012     07/27/2019    CO2 28 07/27/2019    BUN 31 (H) 07/27/2019    CREATININE 1.3 07/27/2019    GLU 87 07/27/2019    HGBA1C 5.3 07/27/2019    AST 20 07/27/2019    ALT 15 07/27/2019    ALBUMIN 3.5 07/27/2019    PROT 7.0 07/27/2019    BILITOT 0.3 07/27/2019    CHOL 197 07/27/2019    HDL 70 07/27/2019    LDLCALC 104.4 07/27/2019    TRIG 113 07/27/2019       Lab Results   Component Value Date    WBC 7.67 07/27/2019    HGB 11.8 (L) 07/27/2019    HCT 36.0 (L) 07/27/2019    MCV 99 (H) 07/27/2019     07/27/2019       Lab Results   Component Value Date    TSH 1.930 11/02/2017           Assessment and Plan     Migraine without Aura, chronic. Continue with Zonisamide 200 mg daily and B2 400 mg daily for added prevention. For acute treatment continue with Zofran prn, and Relpax,  alternating with Migranal. No butalbital for the last 4 months  Add Intranasal Lidocaine. Consider Sprix.  He is on #100 Oxycodone 10 mg by his pain doctor. D/C Aimovig (no further benefit)  Currently on Emgality, status post 4 doses. Continue to at least 6 doses. If no further benefit consider Ajovy  Medication Overuse Headache, has cut down from QID to BID  Continue Gammacore   RTC in 6 months with headache diary    Counseling:  More than 50% of the 25 minute encounter was spent face to face counseling the patient regarding current status and future plan of care as well as side of the medications. I focused on the importance to limit opioids and to observe the contract which includes controlled substances administered by only one prescriber. All questions were answered to patient's satisfaction        Dana Conn M.D  Medical Director, Headache and Facial Pain  [facility]

## 2020-02-19 ENCOUNTER — TELEPHONE (OUTPATIENT)
Dept: PHARMACY | Facility: CLINIC | Age: 65
End: 2020-02-19

## 2020-02-21 DIAGNOSIS — G43.719 INTRACTABLE CHRONIC MIGRAINE WITHOUT AURA AND WITHOUT STATUS MIGRAINOSUS: Primary | ICD-10-CM

## 2020-03-23 DIAGNOSIS — G43.711 CHRONIC MIGRAINE WITHOUT AURA, WITH INTRACTABLE MIGRAINE, SO STATED, WITH STATUS MIGRAINOSUS: ICD-10-CM

## 2020-03-23 RX ORDER — ONDANSETRON 8 MG/1
TABLET, ORALLY DISINTEGRATING ORAL
Qty: 60 TABLET | Refills: 0 | Status: SHIPPED | OUTPATIENT
Start: 2020-03-23 | End: 2020-09-16

## 2020-04-01 ENCOUNTER — TELEPHONE (OUTPATIENT)
Dept: NEUROLOGY | Facility: CLINIC | Age: 65
End: 2020-04-01

## 2020-04-16 ENCOUNTER — TELEPHONE (OUTPATIENT)
Dept: PHARMACY | Facility: CLINIC | Age: 65
End: 2020-04-16

## 2020-04-20 ENCOUNTER — OFFICE VISIT (OUTPATIENT)
Dept: NEUROLOGY | Facility: CLINIC | Age: 65
End: 2020-04-20
Payer: COMMERCIAL

## 2020-04-20 DIAGNOSIS — G43.719 INTRACTABLE CHRONIC MIGRAINE WITHOUT AURA AND WITHOUT STATUS MIGRAINOSUS: Primary | ICD-10-CM

## 2020-04-20 DIAGNOSIS — I10 ESSENTIAL HYPERTENSION: ICD-10-CM

## 2020-04-20 DIAGNOSIS — K21.9 GASTROESOPHAGEAL REFLUX DISEASE WITHOUT ESOPHAGITIS: ICD-10-CM

## 2020-04-20 DIAGNOSIS — M79.2 NEURALGIA AND NEURITIS: ICD-10-CM

## 2020-04-20 DIAGNOSIS — G47.00 INSOMNIA, UNSPECIFIED TYPE: ICD-10-CM

## 2020-04-20 DIAGNOSIS — M54.81 BILATERAL OCCIPITAL NEURALGIA: ICD-10-CM

## 2020-04-20 DIAGNOSIS — Z98.84 HISTORY OF BARIATRIC SURGERY: ICD-10-CM

## 2020-04-20 PROCEDURE — 99214 PR OFFICE/OUTPT VISIT, EST, LEVL IV, 30-39 MIN: ICD-10-PCS | Mod: 95,,, | Performed by: PSYCHIATRY & NEUROLOGY

## 2020-04-20 PROCEDURE — 99214 OFFICE O/P EST MOD 30 MIN: CPT | Mod: 95,,, | Performed by: PSYCHIATRY & NEUROLOGY

## 2020-04-20 PROCEDURE — 1101F PR PT FALLS ASSESS DOC 0-1 FALLS W/OUT INJ PAST YR: ICD-10-PCS | Mod: CPTII,,, | Performed by: PSYCHIATRY & NEUROLOGY

## 2020-04-20 PROCEDURE — 1101F PT FALLS ASSESS-DOCD LE1/YR: CPT | Mod: CPTII,,, | Performed by: PSYCHIATRY & NEUROLOGY

## 2020-04-20 NOTE — PROGRESS NOTES
Subjective:       Patient ID: Booker Back is a 64 y.o. male.    Chief Complaint: Headache  The patient location is: Home  The chief complaint leading to consultation is: Migraine  Visit type: Virtual visit with synchronous audio and video  Total time spent with patient: 25 minutes  The patient was informed of the relationship between the physician and patient and notified that he or she may decline to receive medical services by telemedicine and may withdraw from such care at any time.    INTERVAL HISTORY 4/20/2020  The patient presents for a follow up virtual visit. He is doing well and having headache frees since on Emgality. He is due for the next dose in the immediate future. He is  However, he is still in medication overuse from OTC analgesics. Otherwise information below is still accurate and current.      INTERVAL HISTORY 1/20/2020  The patient comes for follow up. He is stable. He has tried Emgality for 4 months and is beginning to see an improvement. He went to [place] over [place], forgot his Oxycodone and obtained #9 tabs from the ED to hold him until back home. Not taken Butalbital for 4 months. He is here to discuss treatment optimization. Otherwise information below is still accurate and current.      INTERVAL HISTORY 7/15/2019  The patient comes for follow up. He has a number of issues to discuss today and comes with a list of questions. His headaches are near daily or daily but he sees an improvement in that, he uses medication BID instead of QID for escalations on a daily basis. He states that recently, he went on a cruise to [place]. His wife forgot her OxyContin. He shared his Oxycodone with her and as a result he did not have enough for himself. The cruise physician gave them a prescription toward the end of the trip. He uses limited amounts of Relpax and Migranal. He is aware of the overall limit of treating escalations 10 days per month. He is on Aimovig 140 mg q28 days and Zonisamide 200  mg daily.    INTERVAL HISTORY 3/26/2019  The patient comes for follow up. He is benefiting from Aimovig, now at 140 mg every 28 days. He also benefited from Cymbalta but when he went to 60 mg he became very sedated. He is aware that he is in medication overuse headache and is perpetually trying to come off it. He would like to try strategies to break the cycle. He is here to discus options    INTERVAL HISTORY  The patient comes for follow up. He is significantly better. After taking Aimovig 70 mg, he reports about 60% overall improvement. He has noted some improvement with Gammacore but the headache tends to recur. He manages escalations starting with Zofran, then either Relpax or Migranal and rescues with Fioricet. He averages 10 Fioricet per month. He is also on Percocet for his back pain. He has not taken Percocet for 4 days in a row, 2 out of those days he had a headache. Last July, he had aphakia OS, ever since he has intermittent conjunctival redness. He was in a  tour this summer and did quite well overall.    HPI    The patient is a pleasant 62 y/o male with long time history of headaches. Over the years he has seen neurologists headache specialists and has received expert care. In addition to experiencing tension typt headache and migraine headache, he describes an episode of Ophthalmic Migraine, characterized by loss of vision lasting 2 to 3 hours, not followed by head pain which occurred 10 to 15 years prior to two episodes of Transient Global Amnesia. They happened in 2013 and they were about a month apart. He was admitted to the hospital where imaging was obtained as well as EEG all of which was reportedly negative. He was given the diagnosis of TGA, ex juvantivus. He states that it was after the episodes of TGA when he started having migraine headaches. About 12 years ago he was diagnosed with Medication overuse headache, he was taking Advil 400 mg twice daily. He saw Dr. Nettie Elena who advised  "him to stop taking Advil. His headaches were resolved but his back pain flared up. His back pain was so severe that he started taking Advil again resulting in headache relapse. In order to treat his back pain he underwent interventional procedures including MBB and RFA without significant relief. When offered a spinal cord stimulator he pursued a second opinion. Instead of having the SCS placed he decided to undergo trans foraminal injections. When done at one level worked about 30% but when given at 3 levels it worked very well. He ascribes a lot of his headaches to "sinus problems." He had 4 headaches last month of October, thus far he has had 4 headaches in November. This increased is attributed to recent sinus surgery. His past medical history is also noteworthy for bariatric surgery, a sleeve stomach reduction which has resulted in profound weight loss. He is overall pleased on his current regime which includes Zonisamide 200 mg daily, Zofran 8 mg prn, Migranal alternating with Relpax and limited doses of Fioricet for rescue. For prevention, he has also tried Gabapentin for 1 month, poorly tolerated. Botox, 3 rounds by Dr. Ky Jensen provided no relief.  He is not a candidate for Topamax because of risk of kidney stones. He cannot take Beta blockers because of history of asthma. He has taken Elavil in the past for reactive depression with no effect on his headaches.   Please see details of headache characteristics headache below.  Headache questionnaire     1. When did your Headaches start?    Age 9     2. Where are your headaches located?   Usually left temple      3. Your headache's characteristics:  Excruciating, Pressure, Throbbing, Pounding, Stabbing, Like a tight band        4. How long does the headache last?  Hours, days     5. How often does the headache occur?  Worst when sinus problems        6. Are your headaches preceded or accompanied by other symptoms? yes  If yes, please describe. " Sometimes photophobia, occasional aura        7. Does the headache awaken you at night? no  If so, how often?            8. Please laura the word that best describes your headache's intensity:    moderate        9. Using a scale of 1 through 10, with 0 = no pain and 10 = the worst pain:  What score is your headache now? 0  What score is your headache at its worst? 9  What score is your headache at its best? 1         10. Possible associated headache symptoms:  [x]  Sensitivity to light  [] Dizziness  [x] Nasal or sinus pressure/ pain   [x] Sensitivity to noise  [] Vertigo  [] Problems with concentration  [] Sensitivity to smells  [] Ringing in ears  [] Problems with memory    [] Blurred vision  [x] Irritability  [x] Problems with task completion    [] Double vision  [] Anger  [x]  Problems with relaxation  [x] Loss of appetite  [] Anxiety  [x] Neck tightness, Neck pain  [x] Nausea   [x] Nasal congestion  [] Vomiting           11. Headache improving factors:  [x] Sleep  [] Heat  [x] Darkness  [x] Ice  [x] Local pressure [] Menses (period)  [] Massage   [x] Medications:         12. Headache worsening factors:    [x] Fatigue [x] Sneezing  [x] Changes in Weather  [x] Light [] Bending Over [] Stress  [x] Noise [] Ovulation  [] Multiple Sclerosis Flare-Up  [] Smells  [] Menses  [] Food    [x] Coughing [] Alcohol        13. Number of caffeinated drinks per day: 0        14. Number of diet drinks per day: 0     Review of Systems   Constitutional:  Negative for appetite change, chills, fatigue and fever.   HENT: Negative for dental problem, ear pain, hearing loss, tinnitus, trouble swallowing and voice change.    Eyes: Negative for photophobia, pain and visual disturbance.   Respiratory: Negative for cough, chest tightness and shortness of breath.    Cardiovascular: Negative for chest pain, palpitations and leg swelling.   Gastrointestinal: Negative for abdominal pain, blood in stool, constipation, diarrhea, nausea and  vomiting.   Endocrine: Negative for cold intolerance and heat intolerance.   Genitourinary: Negative for difficulty urinating, dysuria and urgency.   Musculoskeletal: Negative for arthralgias, back pain, gait problem, myalgias, neck pain and neck stiffness.   Skin: Negative for color change, pallor and rash.   Neurological: Positive for headaches. Negative for dizziness, tremors, seizures, syncope, facial asymmetry, speech difficulty, weakness, light-headedness and numbness.   Hematological: Negative for adenopathy. Does not bruise/bleed easily.   Psychiatric/Behavioral: Negative for agitation, behavioral problems, confusion, decreased concentration, self-injury, sleep disturbance and suicidal ideas. The patient is not nervous/anxious.          Past Medical History   Diagnosis Date    Abnormal liver enzymes     Anemia     Arthritis     Asthma     Azotemia     Diverticulosis     Esophageal reflux     H/O Nasal MRSA      Hepatitis, unspecified     Hypotension     Infectious mononucleosis     Insomnia     Instability of knee joint     Left Axillary Furuncle 1/26/16     Migraine     Obesity, unspecified     ALPHONSO on CPAP     Other and unspecified hyperlipidemia     Other seborrheic keratosis     Other specified disorder of male genital organs(608.89)     Pulmonary nodule     Renal stone     Seasonal allergic rhinitis     Snoring     Spontaneous ecchymoses     Unspecified essential hypertension     Unspecified vitamin D deficiency     Variants of migraine, not elsewhere classified, without mention of intractable migraine without mention of status migrainosus      Past Surgical History   Procedure Laterality Date    Appendectomy      [place] tooth extraction      Inguinal hernia repair      Wrist fracture surgery Left     Gastric sleeve  11/2012    Radiofrequency ablation       lumbar back    Esophageal dilation       2016    Transformal injections       x 2     Family History   Problem  Relation Age of Onset    Hypertension Mother     Stroke Mother     Cancer Father      throat    Alcohol abuse Father     Heart disease Father     Heart attacks under age 50 Father 49     Social History     Social History    Marital status:      Spouse name: N/A    Number of children: N/A    Years of education: N/A     Occupational History    Not on file.     Social History Main Topics    Smoking status: Never Smoker    Smokeless tobacco: Not on file    Alcohol use No    Drug use: No    Sexual activity: Not on file     Other Topics Concern    Not on file     Social History Narrative     Allergies   Allergen Reactions    Tetracyclines Rash       Current Outpatient Medications     Current Outpatient Medications   Medication Sig    acetaminophen (TYLENOL EXTRA STRENGTH ORAL) Take 2 tablets by mouth 2 (two) times daily as needed.    amLODIPine (NORVASC) 5 MG tablet Take 1 tablet (5 mg total) by mouth once daily.    bioflav,lemon/vit Bcomp,C (LIPOFLAVOVIT ORAL) Take by mouth.    butalbital-acetaminophen-caff -40 mg -40 mg Cap Take 1 Tablet by mouth three times a day as needed for migraines    desloratadine (CLARINEX) 5 mg tablet Take 5 mg by mouth once daily.     dihydroergotamine (MIGRANAL) 0.5 mg/pump act. (4 mg/mL) nasal spray Use in one nostril as directed.  No more than 4 sprays in one hour  Please provide the patient with 90 day supply    eletriptan (RELPAX) 40 MG tablet Take 1 tablet by mouth, may repeat in 2 hours if necessary  Allow 18  tablets, a 90 day supply    eszopiclone (LUNESTA) 3 mg Tab TAKE 1 TABLET EVERY EVENING    ferrous sulfate 325 mg (65 mg iron) Tab tablet Take 325 mg by mouth 2 (two) times daily.     galcanezumab-gnlm (EMGALITY PEN) 120 mg/mL PnIj Inject 120 mg into the skin every 28 days.    ibuprofen (ADVIL,MOTRIN) 800 MG tablet Take 800 mg by mouth every 6 (six) hours as needed for Pain.    irbesartan (AVAPRO) 150 MG tablet TAKE 1 TABLET ONCE  DAILY    lansoprazole (PREVACID) 30 MG capsule Take 30 mg by mouth once daily.    melatonin 10 mg Cap Take 1 capsule by mouth every evening.     metoclopramide HCl (REGLAN) 10 MG tablet Take 10 mg by mouth 4 (four) times daily.    montelukast (SINGULAIR) 10 mg tablet     ondansetron (ZOFRAN-ODT) 8 MG TbDL DISSOLVE 1 TABLET(8 MG) ON THE TONGUE EVERY 6 HOURS AS NEEDED    oxycodone-acetaminophen (PERCOCET)  mg per tablet Take 1 tablet by mouth.    polyethylene glycol (GLYCOLAX) 17 gram/dose powder Take 17 g by mouth once daily.    QNASL 80 mcg/actuation HFAA 1 spray by Nasal route 2 (two) times daily.     riboflavin, vitamin B2, 400 mg Tab Take by mouth.    spironolactone (ALDACTONE) 25 MG tablet Take 1 tablet (25 mg total) by mouth once daily.    tiZANidine (ZANAFLEX) 4 MG tablet Take 1 tablet (4 mg total) by mouth 3 (three) times daily as needed.    UNABLE TO FIND medication name: lidocaine, prlocaine, lamotrigine, meloxicam cream apply as directed prn    vitamin D 1000 units Tab Take 5,000 Units by mouth once daily. Vitamin d3    zonisamide (ZONEGRAN) 100 MG Cap Take 2 capsules (200 mg total) by mouth once daily.     No current facility-administered medications for this visit.          Objective:      Vitals:    04/20/20    BP: 104/72   Pulse: (!) 46   Resp: 16     Constitutional:   Neurological Exam:  General: well-developed, well-nourished, no distress  Mental status: Awake and alert  Speech language: No dysarthria or aphasia on conversation  Cranial nerves: Face symmetric  Motor: Moves all extremities well  Coordination: No ataxia. No tremor.     Data review:    Lab Results   Component Value Date     07/27/2019    K 4.6 07/27/2019    MG 2.4 07/24/2012     07/27/2019    CO2 28 07/27/2019    BUN 31 (H) 07/27/2019    CREATININE 1.3 07/27/2019    GLU 87 07/27/2019    HGBA1C 5.3 07/27/2019    AST 20 07/27/2019    ALT 15 07/27/2019    ALBUMIN 3.5 07/27/2019    PROT 7.0 07/27/2019     BILITOT 0.3 07/27/2019    CHOL 197 07/27/2019    HDL 70 07/27/2019    LDLCALC 104.4 07/27/2019    TRIG 113 07/27/2019       Lab Results   Component Value Date    WBC 7.67 07/27/2019    HGB 11.8 (L) 07/27/2019    HCT 36.0 (L) 07/27/2019    MCV 99 (H) 07/27/2019     07/27/2019       Lab Results   Component Value Date    TSH 1.930 11/02/2017           Assessment and Plan     Migraine without Aura, chronic. Continue with Zonisamide 200 mg daily and B2 400 mg daily for added prevention. For acute treatment continue with Zofran prn, and Relpax, alternating with Migranal. No butalbital for the last 4 months  Continue Sprix.  Start Nurtec 75 ,g ODT, 1 po daily prn, #8 per month.  Currently on Emgality, status post 6 doses. Doing well  Medication Overuse Headache, has cut down from QID to BID, hopefully, he will be able to cut further when taking Nurtec  Add Nerivio to be used at the onset of escalation, but within 60 minutes of the onset. Use for 45 minutes. Start at 12% stimulation and increase as tolerated up to sub-pain threshold.      RTC in 3 months with headache diary    Counseling:  More than 50% of the 25 minute encounter was spent counseling the patient regarding current status and future plan of care as well as side of the medications. I focused on the importance to limit opioids and to observe the contract which includes controlled substances administered by only one prescriber. All questions were answered to patient's satisfaction        Dana Conn M.D  Medical Director, Headache and Facial Pain  [facility]

## 2020-05-15 ENCOUNTER — PATIENT MESSAGE (OUTPATIENT)
Dept: NEUROLOGY | Facility: CLINIC | Age: 65
End: 2020-05-15

## 2020-05-15 DIAGNOSIS — G43.719 INTRACTABLE CHRONIC MIGRAINE WITHOUT AURA AND WITHOUT STATUS MIGRAINOSUS: ICD-10-CM

## 2020-05-15 RX ORDER — ELETRIPTAN HYDROBROMIDE 40 MG/1
TABLET, FILM COATED ORAL
Qty: 12 TABLET | Refills: 0 | Status: SHIPPED | OUTPATIENT
Start: 2020-05-15 | End: 2020-10-02 | Stop reason: SDUPTHER

## 2020-05-18 ENCOUNTER — TELEPHONE (OUTPATIENT)
Dept: PHARMACY | Facility: CLINIC | Age: 65
End: 2020-05-18

## 2020-05-18 NOTE — TELEPHONE ENCOUNTER
RX call attempt 2 regarding Emgality refill from [facility]. Patient was not reached, left voicemail. Copay $0.00.

## 2020-06-12 ENCOUNTER — TELEPHONE (OUTPATIENT)
Dept: PHARMACY | Facility: CLINIC | Age: 65
End: 2020-06-12

## 2020-07-09 ENCOUNTER — TELEPHONE (OUTPATIENT)
Dept: PHARMACY | Facility: CLINIC | Age: 65
End: 2020-07-09

## 2020-08-07 ENCOUNTER — TELEPHONE (OUTPATIENT)
Dept: PHARMACY | Facility: CLINIC | Age: 65
End: 2020-08-07

## 2020-09-02 DIAGNOSIS — G43.719 INTRACTABLE CHRONIC MIGRAINE WITHOUT AURA AND WITHOUT STATUS MIGRAINOSUS: Primary | ICD-10-CM

## 2020-09-02 RX ORDER — ZONISAMIDE 100 MG/1
200 CAPSULE ORAL DAILY
Qty: 180 CAPSULE | Refills: 3 | Status: SHIPPED | OUTPATIENT
Start: 2020-09-02 | End: 2021-08-08

## 2020-09-04 ENCOUNTER — TELEPHONE (OUTPATIENT)
Dept: PHARMACY | Facility: CLINIC | Age: 65
End: 2020-09-04

## 2020-09-25 ENCOUNTER — PATIENT MESSAGE (OUTPATIENT)
Dept: NEUROLOGY | Facility: CLINIC | Age: 65
End: 2020-09-25

## 2020-10-02 ENCOUNTER — PATIENT MESSAGE (OUTPATIENT)
Dept: NEUROLOGY | Facility: CLINIC | Age: 65
End: 2020-10-02

## 2020-10-02 DIAGNOSIS — G43.719 INTRACTABLE CHRONIC MIGRAINE WITHOUT AURA AND WITHOUT STATUS MIGRAINOSUS: ICD-10-CM

## 2020-10-02 RX ORDER — ELETRIPTAN HYDROBROMIDE 40 MG/1
TABLET, FILM COATED ORAL
Qty: 12 TABLET | Refills: 0 | Status: SHIPPED | OUTPATIENT
Start: 2020-10-02 | End: 2020-10-05

## 2020-10-03 ENCOUNTER — PATIENT MESSAGE (OUTPATIENT)
Dept: NEUROLOGY | Facility: CLINIC | Age: 65
End: 2020-10-03

## 2020-10-05 ENCOUNTER — TELEPHONE (OUTPATIENT)
Dept: PHARMACY | Facility: CLINIC | Age: 65
End: 2020-10-05

## 2020-10-05 ENCOUNTER — PATIENT MESSAGE (OUTPATIENT)
Dept: PHARMACY | Facility: CLINIC | Age: 65
End: 2020-10-05

## 2020-10-06 ENCOUNTER — PATIENT MESSAGE (OUTPATIENT)
Dept: NEUROLOGY | Facility: CLINIC | Age: 65
End: 2020-10-06

## 2020-10-06 ENCOUNTER — TELEPHONE (OUTPATIENT)
Dept: NEUROLOGY | Facility: CLINIC | Age: 65
End: 2020-10-06

## 2020-10-06 NOTE — TELEPHONE ENCOUNTER
----- Message from Collette Sidhu sent at 10/5/2020  4:55 PM CDT -----  Type: Needs Medical Advice  Who Called:  pt  Symptoms (please be specific):    How long has patient had these symptoms:    Pharmacy name and phone #:    Best Call Back Number: [phone]  Additional Information: requesting a callback to schedule an appt. Please contact pt

## 2020-10-07 NOTE — TELEPHONE ENCOUNTER
Called patient and scheduled Virtual visit. Date/Time/Location confirmed. Verbalized understanding.

## 2020-10-09 DIAGNOSIS — G43.719 INTRACTABLE CHRONIC MIGRAINE WITHOUT AURA AND WITHOUT STATUS MIGRAINOSUS: Primary | ICD-10-CM

## 2020-10-09 RX ORDER — GALCANEZUMAB 120 MG/ML
120 INJECTION, SOLUTION SUBCUTANEOUS
Qty: 3 SYRINGE | Refills: 3 | Status: SHIPPED | OUTPATIENT
Start: 2020-10-09 | End: 2020-10-22

## 2020-10-16 ENCOUNTER — TELEPHONE (OUTPATIENT)
Dept: NEUROLOGY | Facility: CLINIC | Age: 65
End: 2020-10-16

## 2020-10-16 NOTE — TELEPHONE ENCOUNTER
After speaking with 3 different phone persons, and holding on line, I finally spoke with the pharmacist to clarify the Emgality is for continuous treatment.

## 2020-10-16 NOTE — TELEPHONE ENCOUNTER
----- Message from Patria Amos sent at 10/15/2020 11:14 AM CDT -----  Contact: Sameera with [facility] at [phone]  Type:  Pharmacy Calling to Clarify an RX    Name of Caller:  Sameera  Pharmacy Name:   [facility]   Prescription Name:  emgalaty  What do they need to clarify?:  starter for emgalaty and if they are new to therapy  Best Call Back Number:  [phone] fax [phone]

## 2020-10-22 ENCOUNTER — OFFICE VISIT (OUTPATIENT)
Dept: NEUROLOGY | Facility: CLINIC | Age: 65
End: 2020-10-22
Payer: COMMERCIAL

## 2020-10-22 DIAGNOSIS — G47.00 INSOMNIA, UNSPECIFIED TYPE: ICD-10-CM

## 2020-10-22 DIAGNOSIS — M79.2 NEURALGIA AND NEURITIS: Primary | ICD-10-CM

## 2020-10-22 DIAGNOSIS — G43.719 INTRACTABLE CHRONIC MIGRAINE WITHOUT AURA AND WITHOUT STATUS MIGRAINOSUS: ICD-10-CM

## 2020-10-22 DIAGNOSIS — Z98.84 HISTORY OF BARIATRIC SURGERY: ICD-10-CM

## 2020-10-22 DIAGNOSIS — M54.81 BILATERAL OCCIPITAL NEURALGIA: ICD-10-CM

## 2020-10-22 DIAGNOSIS — K21.9 GASTROESOPHAGEAL REFLUX DISEASE WITHOUT ESOPHAGITIS: ICD-10-CM

## 2020-10-22 DIAGNOSIS — I10 ESSENTIAL HYPERTENSION: ICD-10-CM

## 2020-10-22 PROCEDURE — 1101F PT FALLS ASSESS-DOCD LE1/YR: CPT | Mod: CPTII,,, | Performed by: PSYCHIATRY & NEUROLOGY

## 2020-10-22 PROCEDURE — 1101F PR PT FALLS ASSESS DOC 0-1 FALLS W/OUT INJ PAST YR: ICD-10-PCS | Mod: CPTII,,, | Performed by: PSYCHIATRY & NEUROLOGY

## 2020-10-22 PROCEDURE — 99214 OFFICE O/P EST MOD 30 MIN: CPT | Mod: 95,,, | Performed by: PSYCHIATRY & NEUROLOGY

## 2020-10-22 PROCEDURE — 99214 PR OFFICE/OUTPT VISIT, EST, LEVL IV, 30-39 MIN: ICD-10-PCS | Mod: 95,,, | Performed by: PSYCHIATRY & NEUROLOGY

## 2020-10-22 RX ORDER — GALCANEZUMAB 120 MG/ML
120 INJECTION, SOLUTION SUBCUTANEOUS
Qty: 3 SYRINGE | Refills: 3 | Status: SHIPPED | OUTPATIENT
Start: 2020-10-22 | End: 2021-01-20

## 2020-10-22 RX ORDER — UBROGEPANT 100 MG/1
100 TABLET ORAL ONCE AS NEEDED
Qty: 10 TABLET | Refills: 5 | Status: SHIPPED | OUTPATIENT
Start: 2020-10-22 | End: 2020-11-02

## 2020-10-22 RX ORDER — ELETRIPTAN HYDROBROMIDE 40 MG/1
TABLET, FILM COATED ORAL
Qty: 18 TABLET | Refills: 4 | Status: SHIPPED | OUTPATIENT
Start: 2020-10-22 | End: 2020-11-12 | Stop reason: SDUPTHER

## 2020-10-22 NOTE — PROGRESS NOTES
Subjective:       Patient ID: Booker Back is a 64 y.o. male.    Chief Complaint: Headache  The patient location is: Home  The chief complaint leading to consultation is: Migraine  Visit type: Virtual visit with synchronous audio and video  Total time spent with patient: 25 minutes  The patient was informed of the relationship between the physician and patient and notified that he or she may decline to receive medical services by telemedicine and may withdraw from such care at any time.    INTERVAL HISTORY 10/22//2020  The patient presents for a follow up virtual visit. He is doing well and having headache free since on Emgality. However, it is only a couple of days per month. He is still in medication overuse from OTC analgesics. Otherwise information below is still accurate and current. He alternate medications for acute attack including Ubrelvy, Relpax, and OTC. He is acutely aware of MOH and is on a constant struggle to decrease use. Otherwise information below is still accurate and current.    INTERVAL HISTORY 4/20/2020  The patient presents for a follow up virtual visit. He is doing well and having headache frees since on Emgality. He is due for the next dose in the immediate future. He is  However, he is still in medication overuse from OTC analgesics. Otherwise information below is still accurate and current.      INTERVAL HISTORY 1/20/2020  The patient comes for follow up. He is stable. He has tried Emgality for 4 months and is beginning to see an improvement. He went to [place] over [place], forgot his Oxycodone and obtained #9 tabs from the ED to hold him until back home. Not taken Butalbital for 4 months. He is here to discuss treatment optimization. Otherwise information below is still accurate and current.      INTERVAL HISTORY 7/15/2019  The patient comes for follow up. He has a number of issues to discuss today and comes with a list of questions. His headaches are near daily or daily but he sees  an improvement in that, he uses medication BID instead of QID for escalations on a daily basis. He states that recently, he went on a cruise to [place]. His wife forgot her OxyContin. He shared his Oxycodone with her and as a result he did not have enough for himself. The cruise physician gave them a prescription toward the end of the trip. He uses limited amounts of Relpax and Migranal. He is aware of the overall limit of treating escalations 10 days per month. He is on Aimovig 140 mg q28 days and Zonisamide 200 mg daily.    INTERVAL HISTORY 3/26/2019  The patient comes for follow up. He is benefiting from Aimovig, now at 140 mg every 28 days. He also benefited from Cymbalta but when he went to 60 mg he became very sedated. He is aware that he is in medication overuse headache and is perpetually trying to come off it. He would like to try strategies to break the cycle. He is here to discus options    INTERVAL HISTORY  The patient comes for follow up. He is significantly better. After taking Aimovig 70 mg, he reports about 60% overall improvement. He has noted some improvement with Gammacore but the headache tends to recur. He manages escalations starting with Zofran, then either Relpax or Migranal and rescues with Fioricet. He averages 10 Fioricet per month. He is also on Percocet for his back pain. He has not taken Percocet for 4 days in a row, 2 out of those days he had a headache. Last July, he had aphakia OS, ever since he has intermittent conjunctival redness. He was in a  tour this summer and did quite well overall.    HPI    The patient is a pleasant 62 y/o male with long time history of headaches. Over the years he has seen neurologists headache specialists and has received expert care. In addition to experiencing tension typt headache and migraine headache, he describes an episode of Ophthalmic Migraine, characterized by loss of vision lasting 2 to 3 hours, not followed by head pain which occurred  "10 to 15 years prior to two episodes of Transient Global Amnesia. They happened in 2013 and they were about a month apart. He was admitted to the hospital where imaging was obtained as well as EEG all of which was reportedly negative. He was given the diagnosis of TGA, ex juvantivus. He states that it was after the episodes of TGA when he started having migraine headaches. About 12 years ago he was diagnosed with Medication overuse headache, he was taking Advil 400 mg twice daily. He saw Dr. Nettie Elena who advised him to stop taking Advil. His headaches were resolved but his back pain flared up. His back pain was so severe that he started taking Advil again resulting in headache relapse. In order to treat his back pain he underwent interventional procedures including MBB and RFA without significant relief. When offered a spinal cord stimulator he pursued a second opinion. Instead of having the SCS placed he decided to undergo trans foraminal injections. When done at one level worked about 30% but when given at 3 levels it worked very well. He ascribes a lot of his headaches to "sinus problems." He had 4 headaches last month of October, thus far he has had 4 headaches in November. This increased is attributed to recent sinus surgery. His past medical history is also noteworthy for bariatric surgery, a sleeve stomach reduction which has resulted in profound weight loss. He is overall pleased on his current regime which includes Zonisamide 200 mg daily, Zofran 8 mg prn, Migranal alternating with Relpax and limited doses of Fioricet for rescue. For prevention, he has also tried Gabapentin for 1 month, poorly tolerated. Botox, 3 rounds by Dr. Ky Jensen provided no relief.  He is not a candidate for Topamax because of risk of kidney stones. He cannot take Beta blockers because of history of asthma. He has taken Elavil in the past for reactive depression with no effect on his headaches.   Please see details of headache " characteristics headache below.  Headache questionnaire     1. When did your Headaches start?    Age 9     2. Where are your headaches located?   Usually left temple      3. Your headache's characteristics:  Excruciating, Pressure, Throbbing, Pounding, Stabbing, Like a tight band        4. How long does the headache last?  Hours, days     5. How often does the headache occur?  Worst when sinus problems        6. Are your headaches preceded or accompanied by other symptoms? yes  If yes, please describe. Sometimes photophobia, occasional aura        7. Does the headache awaken you at night? no  If so, how often?            8. Please laura the word that best describes your headache's intensity:    moderate        9. Using a scale of 1 through 10, with 0 = no pain and 10 = the worst pain:  What score is your headache now? 0  What score is your headache at its worst? 9  What score is your headache at its best? 1         10. Possible associated headache symptoms:  [x]  Sensitivity to light  [] Dizziness  [x] Nasal or sinus pressure/ pain   [x] Sensitivity to noise  [] Vertigo  [] Problems with concentration  [] Sensitivity to smells  [] Ringing in ears  [] Problems with memory    [] Blurred vision  [x] Irritability  [x] Problems with task completion    [] Double vision  [] Anger  [x]  Problems with relaxation  [x] Loss of appetite  [] Anxiety  [x] Neck tightness, Neck pain  [x] Nausea   [x] Nasal congestion  [] Vomiting           11. Headache improving factors:  [x] Sleep  [] Heat  [x] Darkness  [x] Ice  [x] Local pressure [] Menses (period)  [] Massage   [x] Medications:         12. Headache worsening factors:    [x] Fatigue [x] Sneezing  [x] Changes in Weather  [x] Light [] Bending Over [] Stress  [x] Noise [] Ovulation  [] Multiple Sclerosis Flare-Up  [] Smells  [] Menses  [] Food    [x] Coughing [] Alcohol        13. Number of caffeinated drinks per day: 0        14. Number of diet drinks per day: 0     Review of  Systems   Constitutional:  Negative for appetite change, chills, fatigue and fever.   HENT: Negative for dental problem, ear pain, hearing loss, tinnitus, trouble swallowing and voice change.    Eyes: Negative for photophobia, pain and visual disturbance.   Respiratory: Negative for cough, chest tightness and shortness of breath.    Cardiovascular: Negative for chest pain, palpitations and leg swelling.   Gastrointestinal: Negative for abdominal pain, blood in stool, constipation, diarrhea, nausea and vomiting.   Endocrine: Negative for cold intolerance and heat intolerance.   Genitourinary: Negative for difficulty urinating, dysuria and urgency.   Musculoskeletal: Negative for arthralgias, back pain, gait problem, myalgias, neck pain and neck stiffness.   Skin: Negative for color change, pallor and rash.   Neurological: Positive for headaches. Negative for dizziness, tremors, seizures, syncope, facial asymmetry, speech difficulty, weakness, light-headedness and numbness.   Hematological: Negative for adenopathy. Does not bruise/bleed easily.   Psychiatric/Behavioral: Negative for agitation, behavioral problems, confusion, decreased concentration, self-injury, sleep disturbance and suicidal ideas. The patient is not nervous/anxious.          Past Medical History   Diagnosis Date    Abnormal liver enzymes     Anemia     Arthritis     Asthma     Azotemia     Diverticulosis     Esophageal reflux     H/O Nasal MRSA      Hepatitis, unspecified     Hypotension     Infectious mononucleosis     Insomnia     Instability of knee joint     Left Axillary Furuncle 1/26/16     Migraine     Obesity, unspecified     ALPHONSO on CPAP     Other and unspecified hyperlipidemia     Other seborrheic keratosis     Other specified disorder of male genital organs(608.89)     Pulmonary nodule     Renal stone     Seasonal allergic rhinitis     Snoring     Spontaneous ecchymoses     Unspecified essential hypertension      Unspecified vitamin D deficiency     Variants of migraine, not elsewhere classified, without mention of intractable migraine without mention of status migrainosus      Past Surgical History   Procedure Laterality Date    Appendectomy      [place] tooth extraction      Inguinal hernia repair      Wrist fracture surgery Left     Gastric sleeve  11/2012    Radiofrequency ablation       lumbar back    Esophageal dilation       2016    Transformal injections       x 2     Family History   Problem Relation Age of Onset    Hypertension Mother     Stroke Mother     Cancer Father      throat    Alcohol abuse Father     Heart disease Father     Heart attacks under age 50 Father 49     Social History     Social History    Marital status:      Spouse name: N/A    Number of children: N/A    Years of education: N/A     Occupational History    Not on file.     Social History Main Topics    Smoking status: Never Smoker    Smokeless tobacco: Not on file    Alcohol use No    Drug use: No    Sexual activity: Not on file     Other Topics Concern    Not on file     Social History Narrative     Allergies   Allergen Reactions    Tetracyclines Rash       Current Outpatient Medications     Current Outpatient Medications   Medication Sig    acetaminophen (TYLENOL EXTRA STRENGTH ORAL) Take 2 tablets by mouth 2 (two) times daily as needed.    amLODIPine (NORVASC) 5 MG tablet Take 1 tablet (5 mg total) by mouth once daily.    bioflav,lemon/vit Bcomp,C (LIPOFLAVOVIT ORAL) Take by mouth.    butalbital-acetaminophen-caff -40 mg -40 mg Cap Take 1 Tablet by mouth three times a day as needed for migraines    desloratadine (CLARINEX) 5 mg tablet Take 5 mg by mouth once daily.     dihydroergotamine (MIGRANAL) 0.5 mg/pump act. (4 mg/mL) nasal spray Use in one nostril as directed.  No more than 4 sprays in one hour  Please provide the patient with 90 day supply    eletriptan (RELPAX) 40 MG tablet Take 1  tablet by mouth, may repeat in 2 hours if necessary  Allow 18  tablets, a 90 day supply    eszopiclone (LUNESTA) 3 mg Tab TAKE 1 TABLET EVERY EVENING    ferrous sulfate 325 mg (65 mg iron) Tab tablet Take 325 mg by mouth 2 (two) times daily.     galcanezumab-gnlm (EMGALITY PEN) 120 mg/mL PnIj Inject 120 mg into the skin every 28 days.    ibuprofen (ADVIL,MOTRIN) 800 MG tablet Take 800 mg by mouth every 6 (six) hours as needed for Pain.    irbesartan (AVAPRO) 150 MG tablet TAKE 1 TABLET ONCE DAILY    lansoprazole (PREVACID) 30 MG capsule Take 30 mg by mouth once daily.    melatonin 10 mg Cap Take 1 capsule by mouth every evening.     metoclopramide HCl (REGLAN) 10 MG tablet Take 10 mg by mouth 4 (four) times daily.    montelukast (SINGULAIR) 10 mg tablet     ondansetron (ZOFRAN-ODT) 8 MG TbDL DISSOLVE 1 TABLET(8 MG) ON THE TONGUE EVERY 6 HOURS AS NEEDED    oxycodone-acetaminophen (PERCOCET)  mg per tablet Take 1 tablet by mouth.    polyethylene glycol (GLYCOLAX) 17 gram/dose powder Take 17 g by mouth once daily.    QNASL 80 mcg/actuation HFAA 1 spray by Nasal route 2 (two) times daily.     riboflavin, vitamin B2, 400 mg Tab Take by mouth.    spironolactone (ALDACTONE) 25 MG tablet Take 1 tablet (25 mg total) by mouth once daily.    tiZANidine (ZANAFLEX) 4 MG tablet Take 1 tablet (4 mg total) by mouth 3 (three) times daily as needed.    UNABLE TO FIND medication name: lidocaine, prlocaine, lamotrigine, meloxicam cream apply as directed prn    vitamin D 1000 units Tab Take 5,000 Units by mouth once daily. Vitamin d3    zonisamide (ZONEGRAN) 100 MG Cap Take 2 capsules (200 mg total) by mouth once daily.     No current facility-administered medications for this visit.          Objective:      Constitutional:   Neurological Exam:  General: well-developed, well-nourished, no distress  Mental status: Awake and alert  Speech language: No dysarthria or aphasia on conversation  Cranial nerves: Face  symmetric  Motor: Moves all extremities well  Coordination: No ataxia. No tremor.     Data review:    Lab Results   Component Value Date     07/27/2019    K 4.6 07/27/2019    MG 2.4 07/24/2012     07/27/2019    CO2 28 07/27/2019    BUN 31 (H) 07/27/2019    CREATININE 1.3 07/27/2019    GLU 87 07/27/2019    HGBA1C 5.3 07/27/2019    AST 20 07/27/2019    ALT 15 07/27/2019    ALBUMIN 3.5 07/27/2019    PROT 7.0 07/27/2019    BILITOT 0.3 07/27/2019    CHOL 197 07/27/2019    HDL 70 07/27/2019    LDLCALC 104.4 07/27/2019    TRIG 113 07/27/2019       Lab Results   Component Value Date    WBC 7.67 07/27/2019    HGB 11.8 (L) 07/27/2019    HCT 36.0 (L) 07/27/2019    MCV 99 (H) 07/27/2019     07/27/2019       Lab Results   Component Value Date    TSH 1.930 11/02/2017           Assessment and Plan     Migraine without Aura, chronic. Continue with Zonisamide 200 mg daily, Amlodipine, and B2 400 mg daily for added prevention. For acute treatment continue with Zofran prn, and Relpax, alternating with Migranal and Ubrelvy. No butalbital for the last several months  Continue Sprix.  Nurtec 75 ODT not effective  Currently on Emgality, doing well but still reports only a couple of days per month headache free  Medication Overuse Headache, has cut down from QID to BID.  Add Nerivio to be used at the onset of escalation, but within 60 minutes of the onset. Use for 45 minutes. Start at 12% stimulation and increase as tolerated up to sub-pain threshold.      RTC in 3 months with headache diary    Counseling:  More than 50% of the 25 minute encounter was spent counseling the patient regarding current status and future plan of care as well as side of the medications. I focused on the importance to limit opioids and to observe the contract which includes controlled substances administered by only one prescriber. All questions were answered to patient's satisfaction        Dana Conn M.D  Medical Director, Headache  and Facial Pain  [facility]

## 2020-10-23 ENCOUNTER — TELEPHONE (OUTPATIENT)
Dept: NEUROLOGY | Facility: CLINIC | Age: 65
End: 2020-10-23

## 2020-10-23 RX ORDER — ELETRIPTAN HYDROBROMIDE 40 MG/1
TABLET, FILM COATED ORAL
Qty: 6 TABLET | Refills: 11 | Status: SHIPPED | OUTPATIENT
Start: 2020-10-23 | End: 2020-11-05 | Stop reason: CLARIF

## 2020-10-23 NOTE — TELEPHONE ENCOUNTER
Put in mail signed prescription as per Dr Conn, as well as information on Nerivio information and faxed order.

## 2020-10-30 ENCOUNTER — TELEPHONE (OUTPATIENT)
Dept: NEUROLOGY | Facility: CLINIC | Age: 65
End: 2020-10-30

## 2020-10-30 NOTE — TELEPHONE ENCOUNTER
----- Message from Michael Pedersen sent at 10/26/2020  1:47 PM CDT -----  Regarding: rx  Contact: pharmacist  Type:  Pharmacy Calling to Clarify an RX    Name of Caller:  Fernanda  Pharmacy Name:  [facility]  Prescription Name:  well pack  What do they need to clarify?:  directions   Best Call Back Number:  [phone]  Additional Information:

## 2020-11-05 PROBLEM — E16.2 HYPOGLYCEMIA: Status: ACTIVE | Noted: 2020-11-05

## 2020-11-05 PROBLEM — K31.84 GASTROPARESIS: Status: ACTIVE | Noted: 2020-11-05

## 2020-11-05 PROBLEM — K92.2 GI BLEEDING: Status: ACTIVE | Noted: 2020-11-05

## 2020-11-05 PROBLEM — N17.9 AKI (ACUTE KIDNEY INJURY): Status: ACTIVE | Noted: 2020-11-05

## 2020-11-12 ENCOUNTER — PATIENT MESSAGE (OUTPATIENT)
Dept: NEUROLOGY | Facility: CLINIC | Age: 65
End: 2020-11-12

## 2020-11-12 DIAGNOSIS — G43.719 INTRACTABLE CHRONIC MIGRAINE WITHOUT AURA AND WITHOUT STATUS MIGRAINOSUS: ICD-10-CM

## 2020-11-13 ENCOUNTER — TELEPHONE (OUTPATIENT)
Dept: NEUROLOGY | Facility: CLINIC | Age: 65
End: 2020-11-13

## 2020-11-13 RX ORDER — ELETRIPTAN HYDROBROMIDE 40 MG/1
40 TABLET, FILM COATED ORAL DAILY PRN
Qty: 18 TABLET | Refills: 4 | Status: SHIPPED | OUTPATIENT
Start: 2020-11-13 | End: 2021-08-11

## 2020-11-13 NOTE — TELEPHONE ENCOUNTER
Received fax from "[employer] for clarification of order. Called [phone] and spoke with Vannessa, notified it was to be Emgality Pen. She stated that is what shipped to patient.

## 2020-11-17 ENCOUNTER — TELEPHONE (OUTPATIENT)
Dept: NEUROLOGY | Facility: CLINIC | Age: 65
End: 2020-11-17

## 2020-11-17 NOTE — TELEPHONE ENCOUNTER
----- Message from Gabriela  sent at 11/17/2020  9:38 AM CST -----  Regarding: call back  Contact: JACQUELINE MCFARLAND  Type: Needs Medical Advice    Who Called:      Best Call Back Number:     Additional Information: Requesting a call back from Nurse, regarding Rx galcanezumab-gnlm (EMGALITY PEN) 120 mg/mL PnIj 3 Syringe pharmacy needs to know if it is for the PENS or Syringes ,please call back ASAP per pharmacist

## 2020-11-17 NOTE — TELEPHONE ENCOUNTER
Called and spoke with Jailene and informed that it was the pen auto-injector. She will make note of this in record.

## 2020-11-18 ENCOUNTER — CLINICAL SUPPORT (OUTPATIENT)
Dept: URGENT CARE | Facility: CLINIC | Age: 65
End: 2020-11-18
Payer: COMMERCIAL

## 2020-11-18 VITALS
HEIGHT: 72 IN | TEMPERATURE: 99 F | HEART RATE: 73 BPM | RESPIRATION RATE: 16 BRPM | WEIGHT: 187 LBS | BODY MASS INDEX: 25.33 KG/M2 | OXYGEN SATURATION: 95 %

## 2020-11-18 DIAGNOSIS — Z71.84 TRAVEL ADVICE ENCOUNTER: Primary | ICD-10-CM

## 2020-11-18 LAB
CTP QC/QA: YES
SARS-COV-2 RDRP RESP QL NAA+PROBE: NEGATIVE

## 2020-11-18 PROCEDURE — U0002: ICD-10-PCS | Mod: QW,S$GLB,, | Performed by: PHYSICIAN ASSISTANT

## 2020-11-18 PROCEDURE — U0002 COVID-19 LAB TEST NON-CDC: HCPCS | Mod: QW,S$GLB,, | Performed by: PHYSICIAN ASSISTANT

## 2020-11-18 NOTE — PROGRESS NOTES
"covid test for travel  CDC Testing and Quarantine Guidelines for patients with exposure to a known-positive COVID-19 person:    A "close exposure" is defined as anyone who has had an exposure (masked or unmasked) to a known COVID -19 positive person within 6 ft for longer than 15 minutes. If your exposure meets this definition you are required by CDC guidelines to quarantine for 14 days from time of exposure. CDC now states that a test can be performed for an asymptomatic patient (someone who does not have any symptoms) after a close exposure, and that a test should be done if you develop symptoms after a close exposure as described above.     If you meet the definition of a close exposure, it will not matter whether you are experiencing symptoms- quarantine for 14 days after close exposure is required by CDC guidelines regardless of test status- a negative test does not shorten the quarantine period.     If your exposure does not meet the above definition, you can return to your normal daily activities to include social distancing, wearing a mask and frequent handwashing.    "

## 2020-12-08 ENCOUNTER — OFFICE VISIT (OUTPATIENT)
Dept: URGENT CARE | Facility: CLINIC | Age: 65
End: 2020-12-08
Payer: COMMERCIAL

## 2020-12-08 VITALS
SYSTOLIC BLOOD PRESSURE: 135 MMHG | TEMPERATURE: 97 F | RESPIRATION RATE: 18 BRPM | OXYGEN SATURATION: 98 % | BODY MASS INDEX: 25.32 KG/M2 | HEIGHT: 72 IN | WEIGHT: 186.94 LBS | HEART RATE: 88 BPM | DIASTOLIC BLOOD PRESSURE: 93 MMHG

## 2020-12-08 DIAGNOSIS — R05.9 COUGH: Primary | ICD-10-CM

## 2020-12-08 LAB
CTP QC/QA: YES
SARS-COV-2 RDRP RESP QL NAA+PROBE: NEGATIVE

## 2020-12-08 PROCEDURE — U0002: ICD-10-PCS | Mod: QW,S$GLB,, | Performed by: PHYSICIAN ASSISTANT

## 2020-12-08 PROCEDURE — 3008F PR BODY MASS INDEX (BMI) DOCUMENTED: ICD-10-PCS | Mod: CPTII,S$GLB,, | Performed by: PHYSICIAN ASSISTANT

## 2020-12-08 PROCEDURE — 99203 OFFICE O/P NEW LOW 30 MIN: CPT | Mod: S$GLB,,, | Performed by: PHYSICIAN ASSISTANT

## 2020-12-08 PROCEDURE — U0002 COVID-19 LAB TEST NON-CDC: HCPCS | Mod: QW,S$GLB,, | Performed by: PHYSICIAN ASSISTANT

## 2020-12-08 PROCEDURE — 3008F BODY MASS INDEX DOCD: CPT | Mod: CPTII,S$GLB,, | Performed by: PHYSICIAN ASSISTANT

## 2020-12-08 PROCEDURE — 99203 PR OFFICE/OUTPT VISIT, NEW, LEVL III, 30-44 MIN: ICD-10-PCS | Mod: S$GLB,,, | Performed by: PHYSICIAN ASSISTANT

## 2020-12-08 RX ORDER — PROMETHAZINE HYDROCHLORIDE AND CODEINE PHOSPHATE 6.25; 1 MG/5ML; MG/5ML
SOLUTION ORAL
COMMUNITY
Start: 2020-12-05 | End: 2021-02-23

## 2020-12-08 RX ORDER — BUDESONIDE AND FORMOTEROL FUMARATE DIHYDRATE 160; 4.5 UG/1; UG/1
AEROSOL RESPIRATORY (INHALATION) 2 TIMES DAILY PRN
COMMUNITY
Start: 2020-12-05 | End: 2022-08-08

## 2020-12-08 RX ORDER — ALBUTEROL SULFATE 90 UG/1
1 AEROSOL, METERED RESPIRATORY (INHALATION)
COMMUNITY
Start: 2020-12-05

## 2020-12-08 NOTE — LETTER
[address] [address], [address]  [place] [address]  Phone: [phone]  Fax: [phone]          Return to Work/School    Patient: Booker Back  YOB: 1955   Date: 12/08/2020     To Whom It May Concern:     Booker Back was in contact with/seen in my office on 12/08/2020. COVID-19 is present in our communities across the state. There is limited testing for COVID at this time, so not all patients can be tested. In this situation, your employee meets the following criteria:     Booker Back has met the criteria for COVID-19 testing and has a NEGATIVE result. The employee can return to work once they are asymptomatic for 24 hours without the use of fever reducing medications (Tylenol, Motrin, etc).     If you have any questions or concerns, or if I can be of further assistance, please do not hesitate to contact me.     Sincerely,    VALERIE Shaffer

## 2020-12-08 NOTE — PATIENT INSTRUCTIONS
"Your test was NEGATIVE for COVID-19 (coronavirus).      You may leave home and/or return to work when the following conditions are met:   24 hours fever free without fever-reducing medications AND   Improved symptoms   You have not met the conditions of a closed exposure       A "close exposure" is defined as anyone who has had an exposure (masked or unmasked) to a known COVID -19 positive person within 6 ft for longer than 15 minutes. If your exposure meets this definition you are required by CDC guidelines to quarantine for 14 days from time of exposure regardless of test status.      Additional instructions:  · Social distance per your local guidelines  · Call ahead before visiting your doctor.  · Wear a facemask when around others who do not live in your household.  · Cover your coughs and sneezes.  · Wash your hands often with soap and water; hand  can be used, too.      If your symptoms worsen or if you have any other concerns, please contact [facility] at [phone].     Sincerely,    VALERIE Shaffer  "

## 2020-12-08 NOTE — PROGRESS NOTES
Subjective:       Patient ID: Booker Back is a 65 y.o. male.    Vitals:  height is 6' (1.829 m) and weight is 84.8 kg (186 lb 15.2 oz). His temperature is 97.4 °F (36.3 °C). His blood pressure is 135/93 (abnormal) and his pulse is 88. His respiration is 18 and oxygen saturation is 98%.     Chief Complaint: No chief complaint on file.    Pt presents to the clinic for COVID testing. Pt c/o cough, congestion, and post nasal drip, x Friday. Pt is treating with antibiotics    URI   This is a new problem. The current episode started in the past 7 days. The problem has been unchanged. There has been no fever. Associated symptoms include congestion, coughing, nausea and vomiting. Pertinent negatives include no diarrhea, ear pain, rash, sinus pain, sore throat or wheezing. Associated symptoms comments: Pt states that nausea and vomiting are normal and he is being worked up for it. . Treatments tried: antibiotics. The treatment provided no relief.       Constitution: Negative for chills, sweating, fatigue and fever.   HENT: Positive for congestion. Negative for ear pain, sinus pain, sinus pressure, sore throat and voice change.    Neck: Negative for painful lymph nodes.   Eyes: Negative for eye redness.   Respiratory: Positive for cough. Negative for chest tightness, sputum production, bloody sputum, COPD, shortness of breath, stridor, wheezing and asthma.    Gastrointestinal: Positive for nausea and vomiting. Negative for diarrhea.   Musculoskeletal: Negative for muscle ache.   Skin: Negative for rash.   Allergic/Immunologic: Negative for seasonal allergies and asthma.   Hematologic/Lymphatic: Negative for swollen lymph nodes.       Objective:      Physical Exam   Constitutional: He is oriented to person, place, and time. He appears well-developed. He is cooperative.  Non-toxic appearance. He does not appear ill. No distress.   HENT:   Head: Normocephalic and atraumatic.   Ears:   Right Ear: Hearing and external ear  normal.   Left Ear: Hearing and external ear normal.   Nose: No mucosal edema or nasal deformity. No epistaxis. Right sinus exhibits no maxillary sinus tenderness and no frontal sinus tenderness. Left sinus exhibits no maxillary sinus tenderness and no frontal sinus tenderness.   Mouth/Throat: Uvula is midline, oropharynx is clear and moist and mucous membranes are normal. No trismus in the jaw. Normal dentition. No uvula swelling. No posterior oropharyngeal edema.   Eyes: Conjunctivae and lids are normal. No scleral icterus.   Neck: Trachea normal, full passive range of motion without pain and phonation normal. Neck supple. No neck rigidity. No edema and no erythema present.   Cardiovascular: Normal rate, regular rhythm, normal heart sounds and normal pulses.   Pulmonary/Chest: Effort normal and breath sounds normal. No respiratory distress. He has no decreased breath sounds. He has no wheezes. He has no rhonchi. He has no rales.   Abdominal: Normal appearance.   Musculoskeletal: Normal range of motion.         General: No deformity.   Neurological: He is alert and oriented to person, place, and time. He exhibits normal muscle tone. Coordination normal.   Skin: Skin is warm, dry, intact, not diaphoretic and not pale. Psychiatric: His speech is normal and behavior is normal. Mood, judgment and thought content normal.   Nursing note and vitals reviewed.        Assessment:       1. Cough        Plan:         Cough  -     POCT COVID-19 Rapid Screening      Results for orders placed or performed in visit on 12/08/20   POCT COVID-19 Rapid Screening   Result Value Ref Range    POC Rapid COVID Negative Negative     Acceptable Yes         Patient Instructions   Your test was NEGATIVE for COVID-19 (coronavirus).      You may leave home and/or return to work when the following conditions are met:   24 hours fever free without fever-reducing medications AND   Improved symptoms   You have not met the  "conditions of a closed exposure       A "close exposure" is defined as anyone who has had an exposure (masked or unmasked) to a known COVID -19 positive person within 6 ft for longer than 15 minutes. If your exposure meets this definition you are required by CDC guidelines to quarantine for 14 days from time of exposure regardless of test status.      Additional instructions:  · Social distance per your local guidelines  · Call ahead before visiting your doctor.  · Wear a facemask when around others who do not live in your household.  · Cover your coughs and sneezes.  · Wash your hands often with soap and water; hand  can be used, too.      If your symptoms worsen or if you have any other concerns, please contact [facility] at [phone].     Sincerely,    VALERIE Shaffer    "

## 2020-12-12 ENCOUNTER — OFFICE VISIT (OUTPATIENT)
Dept: URGENT CARE | Facility: CLINIC | Age: 65
End: 2020-12-12
Payer: COMMERCIAL

## 2020-12-12 VITALS
SYSTOLIC BLOOD PRESSURE: 149 MMHG | DIASTOLIC BLOOD PRESSURE: 93 MMHG | HEIGHT: 72 IN | RESPIRATION RATE: 16 BRPM | HEART RATE: 72 BPM | WEIGHT: 180 LBS | BODY MASS INDEX: 24.38 KG/M2 | TEMPERATURE: 98 F | OXYGEN SATURATION: 99 %

## 2020-12-12 DIAGNOSIS — R09.89 SYMPTOMS OF UPPER RESPIRATORY INFECTION (URI): Primary | ICD-10-CM

## 2020-12-12 PROCEDURE — 3008F PR BODY MASS INDEX (BMI) DOCUMENTED: ICD-10-PCS | Mod: CPTII,S$GLB,, | Performed by: PHYSICIAN ASSISTANT

## 2020-12-12 PROCEDURE — 99214 PR OFFICE/OUTPT VISIT, EST, LEVL IV, 30-39 MIN: ICD-10-PCS | Mod: 25,S$GLB,, | Performed by: PHYSICIAN ASSISTANT

## 2020-12-12 PROCEDURE — 3008F BODY MASS INDEX DOCD: CPT | Mod: CPTII,S$GLB,, | Performed by: PHYSICIAN ASSISTANT

## 2020-12-12 PROCEDURE — 96372 THER/PROPH/DIAG INJ SC/IM: CPT | Mod: S$GLB,,, | Performed by: PHYSICIAN ASSISTANT

## 2020-12-12 PROCEDURE — 96372 PR INJECTION,THERAP/PROPH/DIAG2ST, IM OR SUBCUT: ICD-10-PCS | Mod: S$GLB,,, | Performed by: PHYSICIAN ASSISTANT

## 2020-12-12 PROCEDURE — 99214 OFFICE O/P EST MOD 30 MIN: CPT | Mod: 25,S$GLB,, | Performed by: PHYSICIAN ASSISTANT

## 2020-12-12 RX ORDER — BETAMETHASONE SODIUM PHOSPHATE AND BETAMETHASONE ACETATE 3; 3 MG/ML; MG/ML
6 INJECTION, SUSPENSION INTRA-ARTICULAR; INTRALESIONAL; INTRAMUSCULAR; SOFT TISSUE
Status: COMPLETED | OUTPATIENT
Start: 2020-12-12 | End: 2020-12-12

## 2020-12-12 RX ORDER — PROMETHAZINE HYDROCHLORIDE AND DEXTROMETHORPHAN HYDROBROMIDE 6.25; 15 MG/5ML; MG/5ML
5 SYRUP ORAL
Qty: 118 ML | Refills: 0 | Status: SHIPPED | OUTPATIENT
Start: 2020-12-12 | End: 2020-12-19

## 2020-12-12 RX ORDER — PREDNISONE 20 MG/1
TABLET ORAL
Qty: 5 TABLET | Refills: 0 | Status: SHIPPED | OUTPATIENT
Start: 2020-12-12 | End: 2020-12-12 | Stop reason: CLARIF

## 2020-12-12 RX ADMIN — BETAMETHASONE SODIUM PHOSPHATE AND BETAMETHASONE ACETATE 6 MG: 3; 3 INJECTION, SUSPENSION INTRA-ARTICULAR; INTRALESIONAL; INTRAMUSCULAR; SOFT TISSUE at 02:12

## 2020-12-12 NOTE — PATIENT INSTRUCTIONS
· Follow up with your primary care if symptoms do not improve, or you may return here at any time.  · If you were referred to a specialist, please follow up with that specialty.  · If you were prescribed antibiotics, please take them to completion.  · If you were prescribed a narcotic or any medication with sedative effects, do not drive or operate heavy equipment or machinery while taking these medications.  · You must understand that you have received treatment at an Urgent Care facility only, and that you may be released before all of your medical problems are known or treated. Urgent Care facilities are not equipped to handle life threatening emergencies. It is recommended that you seek care at an Emergency Department for further evaluation of worsening or concerning symptoms, or possibly life threatening conditions as discussed.                                        If you  smoke, please stop smoking               PLEASE PRACTICE SOCIAL DISTANCING    COVID rapid testing was NEGATIVE at recent previous visit. However based on exposure to known COVID positive individual, and current symptoms, you should quarantine for 10 days from the onset of symptoms. You should be 24 hours symptoms improved/fever free before quarantine end.     You should follow CDC guidelines as well as your employer/school protocols for safely returning to work/school. Please be aware that there are False Negative possibilities with testing and you should return to work based upon CDC guidelines, not simply a negative result, unless your employer/school has a different RTW protocol/guidance for you. If you are able to return to work, you should still quarantine outside of work based on CDC guidance as we discussed.     Please refer to CDC website for additional information - https://www.cdc.gov/coronavirus/2019-ncov/if-you-are-sick/index.html.      Over the counter and home treatment of symptoms:  Alternate tylenol and motrin every 3  hours  Salt water gargles  Cold-eeze helps to reduce the duration of sore throat symptoms  Cepachol helps to numb the discomfort  Chloroseptic spray  Nasal saline spray reduces inflammation and dryness  Warm face compresses as often as you can  Vicks vapor rub at night  Flonase OTC or Nasacort OTC  Simple foods like chicken noodle soup help  Pedialyte helps with dehydration if lacking appetite  Rest as much as you can

## 2020-12-12 NOTE — PROGRESS NOTES
Subjective:       Patient ID: Booker Back is a 65 y.o. male.    Vitals:  height is 6' (1.829 m) and weight is 81.6 kg (180 lb). His temperature is 97.6 °F (36.4 °C). His blood pressure is 149/93 (abnormal) and his pulse is 72. His respiration is 16 and oxygen saturation is 99%.     Chief Complaint: Cough    Patient reports persistent cough, worse at night. Sinus congestion/drainage, onset of symptome 12/07/2020. COVID negative on 12/8 via rapid test.    Cough  This is a new problem. The problem has been unchanged. The problem occurs every few minutes. The cough is productive of purulent sputum. Associated symptoms include a sore throat. Pertinent negatives include no chest pain, chills, fever, headaches, myalgias, postnasal drip, rash, rhinorrhea, shortness of breath or wheezing. Treatments tried: mucinx D, nebulizer. The treatment provided moderate relief. His past medical history is significant for asthma. There is no history of COPD.       Constitution: Negative for chills, fatigue and fever.   HENT: Positive for sore throat. Negative for congestion and postnasal drip.    Neck: Negative for painful lymph nodes.   Cardiovascular: Negative for chest pain and leg swelling.   Eyes: Negative for double vision and blurred vision.   Respiratory: Positive for cough. Negative for shortness of breath and wheezing.    Gastrointestinal: Negative for nausea, vomiting and diarrhea.   Genitourinary: Negative for dysuria, frequency and urgency.   Musculoskeletal: Negative for joint pain, joint swelling, muscle cramps and muscle ache.   Skin: Negative for color change, pale and rash.   Allergic/Immunologic: Negative for seasonal allergies.   Neurological: Negative for dizziness, history of vertigo, light-headedness, passing out and headaches.   Hematologic/Lymphatic: Negative for swollen lymph nodes, easy bruising/bleeding and history of blood clots. Does not bruise/bleed easily.   Psychiatric/Behavioral: Negative for  nervous/anxious, sleep disturbance and depression. The patient is not nervous/anxious.        Objective:      Physical Exam   Constitutional: He is oriented to person, place, and time. He appears well-developed.  Non-toxic appearance. He does not appear ill. No distress.   HENT:   Head: Normocephalic and atraumatic.   Ears:   Right Ear: Hearing and external ear normal.   Left Ear: Hearing and external ear normal.   Eyes: Pupils are equal, round, and reactive to light. Conjunctivae and EOM are normal.   Neck: Normal range of motion. Neck supple. No neck rigidity.   Cardiovascular: Normal rate and regular rhythm.   Pulmonary/Chest: Effort normal and breath sounds normal. No respiratory distress. He has no decreased breath sounds. He has no wheezes. He has no rhonchi.   There was no cough on physical exam    Comments: There was no cough on physical exam    Abdominal:      Comments: Normal appearance of abdomen   Musculoskeletal: Normal range of motion.   Neurological: He is alert and oriented to person, place, and time.   Skin: Skin is warm, dry and not diaphoretic. Psychiatric: His speech is normal and behavior is normal. Mood normal.   Nursing note and vitals reviewed.        Assessment:       1. Symptoms of upper respiratory infection (URI)        Plan:       All hx was provided by the pt or available as part of established EMR. The pt past medical hx, family hx, social hx, and current medications were reviewed. Interpretation of diagnostics performed today were discussed. Tx discussed. Quarantine recommendations based on CDC guidelines discussed. Pt to follow up with [facility] or PCP if no improvement or for any concern. Seek treatment in an ER for worsening. Pt voiced understanding of all discussed, and agreed.    Symptoms of upper respiratory infection (URI)  -     promethazine-dextromethorphan (PROMETHAZINE-DM) 6.25-15 mg/5 mL Syrp; Take 5 mLs by mouth every 4 to 6 hours as needed.  Dispense: 118 mL; Refill: 0  -      Discontinue: predniSONE (DELTASONE) 20 MG tablet; Take 1 tablet (20 mg total) by mouth 2 (two) times daily for 2 days, THEN 1 tablet (20 mg total) once daily for 2 days, THEN 0.5 tablets (10 mg total) once daily for 2 days.  Dispense: 5 tablet; Refill: 0  -     betamethasone acetate-betamethasone sodium phosphate injection 6 mg      Patient Instructions   · Follow up with your primary care if symptoms do not improve, or you may return here at any time.  · If you were referred to a specialist, please follow up with that specialty.  · If you were prescribed antibiotics, please take them to completion.  · If you were prescribed a narcotic or any medication with sedative effects, do not drive or operate heavy equipment or machinery while taking these medications.  · You must understand that you have received treatment at an Urgent Care facility only, and that you may be released before all of your medical problems are known or treated. Urgent Care facilities are not equipped to handle life threatening emergencies. It is recommended that you seek care at an Emergency Department for further evaluation of worsening or concerning symptoms, or possibly life threatening conditions as discussed.                                        If you  smoke, please stop smoking               PLEASE PRACTICE SOCIAL DISTANCING    COVID rapid testing was NEGATIVE at recent previous visit. However based on exposure to known COVID positive individual, and current symptoms, you should quarantine for 10 days from the onset of symptoms. You should be 24 hours symptoms improved/fever free before quarantine end.     You should follow CDC guidelines as well as your employer/school protocols for safely returning to work/school. Please be aware that there are False Negative possibilities with testing and you should return to work based upon CDC guidelines, not simply a negative result, unless your employer/school has a different RTW  protocol/guidance for you. If you are able to return to work, you should still quarantine outside of work based on CDC guidance as we discussed.     Please refer to CDC website for additional information - https://www.cdc.gov/coronavirus/2019-ncov/if-you-are-sick/index.html.      Over the counter and home treatment of symptoms:  Alternate tylenol and motrin every 3 hours  Salt water gargles  Cold-eeze helps to reduce the duration of sore throat symptoms  Cepachol helps to numb the discomfort  Chloroseptic spray  Nasal saline spray reduces inflammation and dryness  Warm face compresses as often as you can  Vicks vapor rub at night  Flonase OTC or Nasacort OTC  Simple foods like chicken noodle soup help  Pedialyte helps with dehydration if lacking appetite  Rest as much as you can

## 2020-12-31 RX ORDER — DIHYDROERGOTAMINE MESYLATE 4 MG/ML
SPRAY NASAL
Qty: 24 ML | Refills: 3 | Status: SHIPPED | OUTPATIENT
Start: 2020-12-31 | End: 2021-01-04 | Stop reason: SDUPTHER

## 2021-01-04 RX ORDER — DIHYDROERGOTAMINE MESYLATE 4 MG/ML
SPRAY NASAL
Qty: 24 ML | Refills: 3 | OUTPATIENT
Start: 2021-01-04 | End: 2021-01-06 | Stop reason: SDUPTHER

## 2021-01-06 RX ORDER — DIHYDROERGOTAMINE MESYLATE 4 MG/ML
SPRAY NASAL
Qty: 24 ML | Refills: 3 | OUTPATIENT
Start: 2021-01-06 | End: 2021-11-04

## 2021-01-11 ENCOUNTER — IMMUNIZATION (OUTPATIENT)
Dept: FAMILY MEDICINE | Facility: CLINIC | Age: 66
End: 2021-01-11
Payer: COMMERCIAL

## 2021-01-11 DIAGNOSIS — Z23 NEED FOR VACCINATION: ICD-10-CM

## 2021-01-11 PROCEDURE — 91300 COVID-19, MRNA, LNP-S, PF, 30 MCG/0.3 ML DOSE VACCINE: CPT | Mod: PBBFAC | Performed by: FAMILY MEDICINE

## 2021-01-13 ENCOUNTER — TELEPHONE (OUTPATIENT)
Dept: NEUROLOGY | Facility: CLINIC | Age: 66
End: 2021-01-13

## 2021-01-14 ENCOUNTER — TELEPHONE (OUTPATIENT)
Dept: NEUROLOGY | Facility: CLINIC | Age: 66
End: 2021-01-14

## 2021-01-20 ENCOUNTER — TELEPHONE (OUTPATIENT)
Dept: NEUROLOGY | Facility: CLINIC | Age: 66
End: 2021-01-20

## 2021-01-21 ENCOUNTER — OFFICE VISIT (OUTPATIENT)
Dept: NEUROLOGY | Facility: CLINIC | Age: 66
End: 2021-01-21
Payer: COMMERCIAL

## 2021-01-21 DIAGNOSIS — G43.719 INTRACTABLE CHRONIC MIGRAINE WITHOUT AURA AND WITHOUT STATUS MIGRAINOSUS: Primary | ICD-10-CM

## 2021-01-21 DIAGNOSIS — I10 ESSENTIAL HYPERTENSION: ICD-10-CM

## 2021-01-21 DIAGNOSIS — Z98.84 HISTORY OF BARIATRIC SURGERY: ICD-10-CM

## 2021-01-21 DIAGNOSIS — M54.81 BILATERAL OCCIPITAL NEURALGIA: ICD-10-CM

## 2021-01-21 DIAGNOSIS — G47.00 INSOMNIA, UNSPECIFIED TYPE: ICD-10-CM

## 2021-01-21 DIAGNOSIS — K21.9 GASTROESOPHAGEAL REFLUX DISEASE WITHOUT ESOPHAGITIS: ICD-10-CM

## 2021-01-21 DIAGNOSIS — N28.9 RENAL INSUFFICIENCY: ICD-10-CM

## 2021-01-21 DIAGNOSIS — M79.2 NEURALGIA AND NEURITIS: ICD-10-CM

## 2021-01-21 PROCEDURE — 99213 OFFICE O/P EST LOW 20 MIN: CPT | Mod: 95,,, | Performed by: PSYCHIATRY & NEUROLOGY

## 2021-01-21 PROCEDURE — 99213 PR OFFICE/OUTPT VISIT, EST, LEVL III, 20-29 MIN: ICD-10-PCS | Mod: 95,,, | Performed by: PSYCHIATRY & NEUROLOGY

## 2021-02-01 ENCOUNTER — IMMUNIZATION (OUTPATIENT)
Dept: FAMILY MEDICINE | Facility: CLINIC | Age: 66
End: 2021-02-01
Payer: COMMERCIAL

## 2021-02-01 DIAGNOSIS — Z23 NEED FOR VACCINATION: Primary | ICD-10-CM

## 2021-02-01 PROCEDURE — 91300 COVID-19, MRNA, LNP-S, PF, 30 MCG/0.3 ML DOSE VACCINE: CPT | Mod: PBBFAC | Performed by: FAMILY MEDICINE

## 2021-02-01 PROCEDURE — 0002A COVID-19, MRNA, LNP-S, PF, 30 MCG/0.3 ML DOSE VACCINE: CPT | Mod: PBBFAC | Performed by: FAMILY MEDICINE

## 2021-02-17 DIAGNOSIS — G43.711 CHRONIC MIGRAINE WITHOUT AURA, WITH INTRACTABLE MIGRAINE, SO STATED, WITH STATUS MIGRAINOSUS: ICD-10-CM

## 2021-02-17 RX ORDER — ONDANSETRON 8 MG/1
TABLET, ORALLY DISINTEGRATING ORAL
Qty: 60 TABLET | Refills: 2 | Status: SHIPPED | OUTPATIENT
Start: 2021-02-17 | End: 2021-12-27

## 2021-03-06 ENCOUNTER — PATIENT MESSAGE (OUTPATIENT)
Dept: NEUROLOGY | Facility: CLINIC | Age: 66
End: 2021-03-06

## 2021-03-08 ENCOUNTER — PATIENT MESSAGE (OUTPATIENT)
Dept: NEUROLOGY | Facility: CLINIC | Age: 66
End: 2021-03-08

## 2021-03-21 ENCOUNTER — OFFICE VISIT (OUTPATIENT)
Dept: URGENT CARE | Facility: CLINIC | Age: 66
End: 2021-03-21
Payer: COMMERCIAL

## 2021-03-21 VITALS
SYSTOLIC BLOOD PRESSURE: 140 MMHG | HEIGHT: 72 IN | OXYGEN SATURATION: 98 % | TEMPERATURE: 98 F | HEART RATE: 73 BPM | DIASTOLIC BLOOD PRESSURE: 88 MMHG | WEIGHT: 190 LBS | BODY MASS INDEX: 25.73 KG/M2

## 2021-03-21 DIAGNOSIS — L02.91 ABSCESS: Primary | ICD-10-CM

## 2021-03-21 PROCEDURE — 10060 PR DRAIN SKIN ABSCESS SIMPLE: ICD-10-PCS | Mod: S$GLB,,, | Performed by: PHYSICIAN ASSISTANT

## 2021-03-21 PROCEDURE — 99214 PR OFFICE/OUTPT VISIT, EST, LEVL IV, 30-39 MIN: ICD-10-PCS | Mod: 25,S$GLB,, | Performed by: PHYSICIAN ASSISTANT

## 2021-03-21 PROCEDURE — 10060 I&D ABSCESS SIMPLE/SINGLE: CPT | Mod: S$GLB,,, | Performed by: PHYSICIAN ASSISTANT

## 2021-03-21 PROCEDURE — 3008F BODY MASS INDEX DOCD: CPT | Mod: CPTII,S$GLB,, | Performed by: PHYSICIAN ASSISTANT

## 2021-03-21 PROCEDURE — 99214 OFFICE O/P EST MOD 30 MIN: CPT | Mod: 25,S$GLB,, | Performed by: PHYSICIAN ASSISTANT

## 2021-03-21 PROCEDURE — 3008F PR BODY MASS INDEX (BMI) DOCUMENTED: ICD-10-PCS | Mod: CPTII,S$GLB,, | Performed by: PHYSICIAN ASSISTANT

## 2021-03-21 RX ORDER — SULFAMETHOXAZOLE AND TRIMETHOPRIM 800; 160 MG/1; MG/1
1 TABLET ORAL 2 TIMES DAILY
Qty: 14 TABLET | Refills: 0 | Status: SHIPPED | OUTPATIENT
Start: 2021-03-21 | End: 2021-03-28

## 2021-03-21 RX ORDER — NALOXEGOL OXALATE 25 MG/1
25 TABLET, FILM COATED ORAL DAILY
COMMUNITY
Start: 2021-03-01 | End: 2021-07-01

## 2021-05-15 ENCOUNTER — OFFICE VISIT (OUTPATIENT)
Dept: URGENT CARE | Facility: CLINIC | Age: 66
End: 2021-05-15
Payer: COMMERCIAL

## 2021-05-15 VITALS
HEIGHT: 72 IN | DIASTOLIC BLOOD PRESSURE: 89 MMHG | TEMPERATURE: 97 F | BODY MASS INDEX: 25.73 KG/M2 | SYSTOLIC BLOOD PRESSURE: 140 MMHG | OXYGEN SATURATION: 98 % | RESPIRATION RATE: 20 BRPM | HEART RATE: 88 BPM | WEIGHT: 190 LBS

## 2021-05-15 DIAGNOSIS — S81.811A LACERATION OF SKIN OF RIGHT LOWER LEG, INITIAL ENCOUNTER: Primary | ICD-10-CM

## 2021-05-15 PROCEDURE — 13122 CMPLX RPR S/A/L ADDL 5 CM/>: CPT | Mod: S$GLB,,, | Performed by: PHYSICIAN ASSISTANT

## 2021-05-15 PROCEDURE — 99214 OFFICE O/P EST MOD 30 MIN: CPT | Mod: 25,S$GLB,, | Performed by: PHYSICIAN ASSISTANT

## 2021-05-15 PROCEDURE — 3008F BODY MASS INDEX DOCD: CPT | Mod: CPTII,S$GLB,, | Performed by: PHYSICIAN ASSISTANT

## 2021-05-15 PROCEDURE — 13122 LACERATION REPAIR: ICD-10-PCS | Mod: S$GLB,,, | Performed by: PHYSICIAN ASSISTANT

## 2021-05-15 PROCEDURE — 13121 CMPLX RPR S/A/L 2.6-7.5 CM: CPT | Mod: S$GLB,,, | Performed by: PHYSICIAN ASSISTANT

## 2021-05-15 PROCEDURE — 3008F PR BODY MASS INDEX (BMI) DOCUMENTED: ICD-10-PCS | Mod: CPTII,S$GLB,, | Performed by: PHYSICIAN ASSISTANT

## 2021-05-15 PROCEDURE — 13121 LACERATION REPAIR: ICD-10-PCS | Mod: S$GLB,,, | Performed by: PHYSICIAN ASSISTANT

## 2021-05-15 PROCEDURE — 99214 PR OFFICE/OUTPT VISIT, EST, LEVL IV, 30-39 MIN: ICD-10-PCS | Mod: 25,S$GLB,, | Performed by: PHYSICIAN ASSISTANT

## 2021-05-15 RX ORDER — SULFAMETHOXAZOLE AND TRIMETHOPRIM 800; 160 MG/1; MG/1
1 TABLET ORAL 2 TIMES DAILY
Qty: 20 TABLET | Refills: 0 | Status: SHIPPED | OUTPATIENT
Start: 2021-05-15 | End: 2021-05-25

## 2021-05-15 RX ORDER — MUPIROCIN 20 MG/G
OINTMENT TOPICAL
Qty: 22 G | Refills: 1 | Status: SHIPPED | OUTPATIENT
Start: 2021-05-15 | End: 2021-12-28

## 2021-05-15 RX ORDER — HYDROCODONE BITARTRATE AND ACETAMINOPHEN 10; 325 MG/1; MG/1
1 TABLET ORAL EVERY 6 HOURS PRN
Qty: 10 TABLET | Refills: 0 | Status: SHIPPED | OUTPATIENT
Start: 2021-05-15 | End: 2021-07-01

## 2021-05-29 ENCOUNTER — CLINICAL SUPPORT (OUTPATIENT)
Dept: URGENT CARE | Facility: CLINIC | Age: 66
End: 2021-05-29
Payer: COMMERCIAL

## 2021-05-29 VITALS
OXYGEN SATURATION: 99 % | TEMPERATURE: 98 F | DIASTOLIC BLOOD PRESSURE: 88 MMHG | HEIGHT: 72 IN | BODY MASS INDEX: 25.73 KG/M2 | WEIGHT: 190 LBS | SYSTOLIC BLOOD PRESSURE: 135 MMHG | HEART RATE: 66 BPM | RESPIRATION RATE: 16 BRPM

## 2021-05-29 DIAGNOSIS — Z48.02 VISIT FOR SUTURE REMOVAL: Primary | ICD-10-CM

## 2021-05-29 PROCEDURE — 99499 NO LOS: ICD-10-PCS | Mod: S$GLB,,, | Performed by: PHYSICIAN ASSISTANT

## 2021-05-29 PROCEDURE — 99499 UNLISTED E&M SERVICE: CPT | Mod: S$GLB,,, | Performed by: PHYSICIAN ASSISTANT

## 2021-09-18 ENCOUNTER — PATIENT MESSAGE (OUTPATIENT)
Dept: NEUROLOGY | Facility: CLINIC | Age: 66
End: 2021-09-18

## 2021-09-20 ENCOUNTER — TELEPHONE (OUTPATIENT)
Dept: NEUROLOGY | Facility: CLINIC | Age: 66
End: 2021-09-20

## 2021-10-06 PROBLEM — R07.9 CHEST PAIN: Status: ACTIVE | Noted: 2021-10-06

## 2021-10-07 DIAGNOSIS — U07.1 COVID-19 VIRUS DETECTED: ICD-10-CM

## 2021-10-07 PROBLEM — Z71.89 ADVANCE CARE PLANNING: Status: ACTIVE | Noted: 2021-10-07

## 2021-10-07 PROBLEM — G47.33 OSA ON CPAP: Status: ACTIVE | Noted: 2021-10-07

## 2021-11-04 ENCOUNTER — PATIENT MESSAGE (OUTPATIENT)
Dept: NEUROLOGY | Facility: CLINIC | Age: 66
End: 2021-11-04
Payer: COMMERCIAL

## 2021-11-04 PROBLEM — D50.9 IRON DEFICIENCY ANEMIA: Status: ACTIVE | Noted: 2021-11-04

## 2021-12-04 ENCOUNTER — PATIENT MESSAGE (OUTPATIENT)
Dept: NEUROLOGY | Facility: CLINIC | Age: 66
End: 2021-12-04
Payer: COMMERCIAL

## 2021-12-06 ENCOUNTER — PATIENT MESSAGE (OUTPATIENT)
Dept: NEUROLOGY | Facility: CLINIC | Age: 66
End: 2021-12-06
Payer: COMMERCIAL

## 2021-12-27 PROBLEM — R94.39 ABNORMAL CARDIOVASCULAR STRESS TEST: Status: ACTIVE | Noted: 2021-12-27

## 2021-12-28 PROBLEM — I25.10 CAD IN NATIVE ARTERY: Status: ACTIVE | Noted: 2021-12-28

## 2021-12-28 PROBLEM — G93.32 COVID-19 LONG HAULER MANIFESTING CHRONIC FATIGUE: Status: ACTIVE | Noted: 2021-12-28

## 2021-12-28 PROBLEM — U09.9 COVID-19 LONG HAULER MANIFESTING CHRONIC FATIGUE: Status: ACTIVE | Noted: 2021-12-28

## 2022-01-12 ENCOUNTER — PATIENT MESSAGE (OUTPATIENT)
Dept: NEUROLOGY | Facility: CLINIC | Age: 67
End: 2022-01-12
Payer: COMMERCIAL

## 2022-01-12 DIAGNOSIS — G43.719 INTRACTABLE CHRONIC MIGRAINE WITHOUT AURA AND WITHOUT STATUS MIGRAINOSUS: ICD-10-CM

## 2022-01-12 RX ORDER — ELETRIPTAN HYDROBROMIDE 40 MG/1
TABLET, FILM COATED ORAL
Qty: 18 TABLET | Refills: 3 | Status: SHIPPED | OUTPATIENT
Start: 2022-01-12 | End: 2022-09-27 | Stop reason: SDUPTHER

## 2022-01-18 ENCOUNTER — TELEPHONE (OUTPATIENT)
Dept: NEUROLOGY | Facility: CLINIC | Age: 67
End: 2022-01-18
Payer: COMMERCIAL

## 2022-01-19 ENCOUNTER — PATIENT MESSAGE (OUTPATIENT)
Dept: NEUROLOGY | Facility: CLINIC | Age: 67
End: 2022-01-19

## 2022-01-19 ENCOUNTER — OFFICE VISIT (OUTPATIENT)
Dept: NEUROLOGY | Facility: CLINIC | Age: 67
End: 2022-01-19
Payer: COMMERCIAL

## 2022-01-19 VITALS
HEIGHT: 72 IN | RESPIRATION RATE: 17 BRPM | BODY MASS INDEX: 28.99 KG/M2 | SYSTOLIC BLOOD PRESSURE: 150 MMHG | WEIGHT: 214 LBS | TEMPERATURE: 98 F | HEART RATE: 76 BPM | DIASTOLIC BLOOD PRESSURE: 89 MMHG

## 2022-01-19 DIAGNOSIS — M54.81 BILATERAL OCCIPITAL NEURALGIA: ICD-10-CM

## 2022-01-19 DIAGNOSIS — I10 ESSENTIAL HYPERTENSION: ICD-10-CM

## 2022-01-19 DIAGNOSIS — G43.719 INTRACTABLE CHRONIC MIGRAINE WITHOUT AURA AND WITHOUT STATUS MIGRAINOSUS: Primary | ICD-10-CM

## 2022-01-19 DIAGNOSIS — M79.2 NEURALGIA AND NEURITIS: ICD-10-CM

## 2022-01-19 DIAGNOSIS — Z98.84 HISTORY OF BARIATRIC SURGERY: ICD-10-CM

## 2022-01-19 PROCEDURE — 3079F DIAST BP 80-89 MM HG: CPT | Mod: CPTII,S$GLB,, | Performed by: PSYCHIATRY & NEUROLOGY

## 2022-01-19 PROCEDURE — 99214 OFFICE O/P EST MOD 30 MIN: CPT | Mod: S$GLB,,, | Performed by: PSYCHIATRY & NEUROLOGY

## 2022-01-19 PROCEDURE — 1101F PR PT FALLS ASSESS DOC 0-1 FALLS W/OUT INJ PAST YR: ICD-10-PCS | Mod: CPTII,S$GLB,, | Performed by: PSYCHIATRY & NEUROLOGY

## 2022-01-19 PROCEDURE — 99999 PR PBB SHADOW E&M-EST. PATIENT-LVL V: CPT | Mod: PBBFAC,,, | Performed by: PSYCHIATRY & NEUROLOGY

## 2022-01-19 PROCEDURE — 3079F PR MOST RECENT DIASTOLIC BLOOD PRESSURE 80-89 MM HG: ICD-10-PCS | Mod: CPTII,S$GLB,, | Performed by: PSYCHIATRY & NEUROLOGY

## 2022-01-19 PROCEDURE — 3077F SYST BP >= 140 MM HG: CPT | Mod: CPTII,S$GLB,, | Performed by: PSYCHIATRY & NEUROLOGY

## 2022-01-19 PROCEDURE — 3008F BODY MASS INDEX DOCD: CPT | Mod: CPTII,S$GLB,, | Performed by: PSYCHIATRY & NEUROLOGY

## 2022-01-19 PROCEDURE — 1101F PT FALLS ASSESS-DOCD LE1/YR: CPT | Mod: CPTII,S$GLB,, | Performed by: PSYCHIATRY & NEUROLOGY

## 2022-01-19 PROCEDURE — 99999 PR PBB SHADOW E&M-EST. PATIENT-LVL V: ICD-10-PCS | Mod: PBBFAC,,, | Performed by: PSYCHIATRY & NEUROLOGY

## 2022-01-19 PROCEDURE — 3077F PR MOST RECENT SYSTOLIC BLOOD PRESSURE >= 140 MM HG: ICD-10-PCS | Mod: CPTII,S$GLB,, | Performed by: PSYCHIATRY & NEUROLOGY

## 2022-01-19 PROCEDURE — 1125F PR PAIN SEVERITY QUANTIFIED, PAIN PRESENT: ICD-10-PCS | Mod: CPTII,S$GLB,, | Performed by: PSYCHIATRY & NEUROLOGY

## 2022-01-19 PROCEDURE — 3288F FALL RISK ASSESSMENT DOCD: CPT | Mod: CPTII,S$GLB,, | Performed by: PSYCHIATRY & NEUROLOGY

## 2022-01-19 PROCEDURE — 3288F PR FALLS RISK ASSESSMENT DOCUMENTED: ICD-10-PCS | Mod: CPTII,S$GLB,, | Performed by: PSYCHIATRY & NEUROLOGY

## 2022-01-19 PROCEDURE — 3008F PR BODY MASS INDEX (BMI) DOCUMENTED: ICD-10-PCS | Mod: CPTII,S$GLB,, | Performed by: PSYCHIATRY & NEUROLOGY

## 2022-01-19 PROCEDURE — 1159F PR MEDICATION LIST DOCUMENTED IN MEDICAL RECORD: ICD-10-PCS | Mod: CPTII,S$GLB,, | Performed by: PSYCHIATRY & NEUROLOGY

## 2022-01-19 PROCEDURE — 99214 PR OFFICE/OUTPT VISIT, EST, LEVL IV, 30-39 MIN: ICD-10-PCS | Mod: S$GLB,,, | Performed by: PSYCHIATRY & NEUROLOGY

## 2022-01-19 PROCEDURE — 1159F MED LIST DOCD IN RCRD: CPT | Mod: CPTII,S$GLB,, | Performed by: PSYCHIATRY & NEUROLOGY

## 2022-01-19 PROCEDURE — 1125F AMNT PAIN NOTED PAIN PRSNT: CPT | Mod: CPTII,S$GLB,, | Performed by: PSYCHIATRY & NEUROLOGY

## 2022-01-19 RX ORDER — FREMANEZUMAB-VFRM 225 MG/1.5ML
225 INJECTION SUBCUTANEOUS
Qty: 1 EACH | Refills: 11 | Status: SHIPPED | OUTPATIENT
Start: 2022-01-19 | End: 2023-01-17 | Stop reason: SDUPTHER

## 2022-01-19 RX ORDER — ZONISAMIDE 100 MG/1
CAPSULE ORAL
Qty: 180 CAPSULE | Refills: 3 | Status: SHIPPED | OUTPATIENT
Start: 2022-01-19 | End: 2022-10-03

## 2022-01-19 NOTE — PROGRESS NOTES
Subjective:       Patient ID: Booker Back is a 66 y.o. male.    Chief Complaint: Headache    INTERVAL HISTORY 1/19/2022  The patient presents for a follow up. He is stable. Emgality had decreased the intensity but not the frequency eventually lost efficacy completely. He passed out on 1/15 while sitting in the bathroom and hit his forehead causing a large laceration requiring multiple stitches. CT of head negative. He is still in medication overuse from OTC analgesics. In addition to the Tylenol and the opioid medication given by pain medicine. Moreover, he suffers with long Covid, unable to work. 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 He alternates medications for acute attack including Ubrelvy, Relpax, and OTC. He is acutely aware of MOH and is on a constant struggle to decrease use. Otherwise information below is still accurate and current.    INTERVAL HISTORY 1/21/2021  The patient presents for a follow up virtual visit. He is stable. Emgality has decreased the intensity but not the frequency which is still daily. He is still in medication overuse from OTC analgesics. Tylenol in addition to the Tylenol in the opioid medication. Otherwise information below is still accurate and current. He alternates medications for acute attack including Ubrelvy, Relpax, and OTC. He is acutely aware of MOH and is on a constant struggle to decrease use. Otherwise information below is still accurate and current.    INTERVAL HISTORY 10/22//2020  The patient presents for a follow up virtual visit. He is doing well and having headache free since on Emgality. However, it is only a couple of days per month. He is still in medication overuse from OTC analgesics. Otherwise information below is still accurate and current. He alternate medications for acute attack including Ubrelvy, Relpax, and OTC. He is acutely aware of MOH and is on a constant struggle to decrease use. Otherwise information below is still accurate and  current.    INTERVAL HISTORY 4/20/2020  The patient presents for a follow up virtual visit. He is doing well and having headache frees since on Emgality. He is due for the next dose in the immediate future. He is  However, he is still in medication overuse from OTC analgesics. Otherwise information below is still accurate and current.      INTERVAL HISTORY 1/20/2020  The patient comes for follow up. He is stable. He has tried Emgality for 4 months and is beginning to see an improvement. He went to [place] over [place], forgot his Oxycodone and obtained #9 tabs from the ED to hold him until back home. Not taken Butalbital for 4 months. He is here to discuss treatment optimization. Otherwise information below is still accurate and current.      INTERVAL HISTORY 7/15/2019  The patient comes for follow up. He has a number of issues to discuss today and comes with a list of questions. His headaches are near daily or daily but he sees an improvement in that, he uses medication BID instead of QID for escalations on a daily basis. He states that recently, he went on a cruise to [place]. His wife forgot her OxyContin. He shared his Oxycodone with her and as a result he did not have enough for himself. The cruise physician gave them a prescription toward the end of the trip. He uses limited amounts of Relpax and Migranal. He is aware of the overall limit of treating escalations 10 days per month. He is on Aimovig 140 mg q28 days and Zonisamide 200 mg daily.    INTERVAL HISTORY 3/26/2019  The patient comes for follow up. He is benefiting from Aimovig, now at 140 mg every 28 days. He also benefited from Cymbalta but when he went to 60 mg he became very sedated. He is aware that he is in medication overuse headache and is perpetually trying to come off it. He would like to try strategies to break the cycle. He is here to discus options    INTERVAL HISTORY  The patient comes for follow up. He is significantly better. After  taking Aimovig 70 mg, he reports about 60% overall improvement. He has noted some improvement with Gammacore but the headache tends to recur. He manages escalations starting with Zofran, then either Relpax or Migranal and rescues with Fioricet. He averages 10 Fioricet per month. He is also on Percocet for his back pain. He has not taken Percocet for 4 days in a row, 2 out of those days he had a headache. Last July, he had aphakia OS, ever since he has intermittent conjunctival redness. He was in a  tour this summer and did quite well overall.    HPI    The patient is a pleasant 60 y/o male with long time history of headaches. Over the years he has seen neurologists headache specialists and has received expert care. In addition to experiencing tension typt headache and migraine headache, he describes an episode of Ophthalmic Migraine, characterized by loss of vision lasting 2 to 3 hours, not followed by head pain which occurred 10 to 15 years prior to two episodes of Transient Global Amnesia. They happened in 2013 and they were about a month apart. He was admitted to the hospital where imaging was obtained as well as EEG all of which was reportedly negative. He was given the diagnosis of TGA, ex juvantivus. He states that it was after the episodes of TGA when he started having migraine headaches. About 12 years ago he was diagnosed with Medication overuse headache, he was taking Advil 400 mg twice daily. He saw Dr. Nettie Elena who advised him to stop taking Advil. His headaches were resolved but his back pain flared up. His back pain was so severe that he started taking Advil again resulting in headache relapse. In order to treat his back pain he underwent interventional procedures including MBB and RFA without significant relief. When offered a spinal cord stimulator he pursued a second opinion. Instead of having the SCS placed he decided to undergo trans foraminal injections. When done at one level worked  "about 30% but when given at 3 levels it worked very well. He ascribes a lot of his headaches to "sinus problems." He had 4 headaches last month of October, thus far he has had 4 headaches in November. This increased is attributed to recent sinus surgery. His past medical history is also noteworthy for bariatric surgery, a sleeve stomach reduction which has resulted in profound weight loss. He is overall pleased on his current regime which includes Zonisamide 200 mg daily, Zofran 8 mg prn, Migranal alternating with Relpax and limited doses of Fioricet for rescue. For prevention, he has also tried Gabapentin for 1 month, poorly tolerated. Botox, 3 rounds by Dr. Ky Jensen provided no relief.  He is not a candidate for Topamax because of risk of kidney stones. He cannot take Beta blockers because of history of asthma. He has taken Elavil in the past for reactive depression with no effect on his headaches.   Please see details of headache characteristics headache below.  Headache questionnaire     1. When did your Headaches start?    Age 9     2. Where are your headaches located?   Usually left temple      3. Your headache's characteristics:  Excruciating, Pressure, Throbbing, Pounding, Stabbing, Like a tight band        4. How long does the headache last?  Hours, days     5. How often does the headache occur?  Worst when sinus problems        6. Are your headaches preceded or accompanied by other symptoms? yes  If yes, please describe. Sometimes photophobia, occasional aura        7. Does the headache awaken you at night? no  If so, how often?            8. Please laura the word that best describes your headache's intensity:    moderate        9. Using a scale of 1 through 10, with 0 = no pain and 10 = the worst pain:  What score is your headache now? 0  What score is your headache at its worst? 9  What score is your headache at its best? 1         10. Possible associated headache symptoms:  [x]  Sensitivity to " light  [] Dizziness  [x] Nasal or sinus pressure/ pain   [x] Sensitivity to noise  [] Vertigo  [] Problems with concentration  [] Sensitivity to smells  [] Ringing in ears  [] Problems with memory    [] Blurred vision  [x] Irritability  [x] Problems with task completion    [] Double vision  [] Anger  [x]  Problems with relaxation  [x] Loss of appetite  [] Anxiety  [x] Neck tightness, Neck pain  [x] Nausea   [x] Nasal congestion  [] Vomiting           11. Headache improving factors:  [x] Sleep  [] Heat  [x] Darkness  [x] Ice  [x] Local pressure [] Menses (period)  [] Massage   [x] Medications:         12. Headache worsening factors:    [x] Fatigue [x] Sneezing  [x] Changes in Weather  [x] Light [] Bending Over [] Stress  [x] Noise [] Ovulation  [] Multiple Sclerosis Flare-Up  [] Smells  [] Menses  [] Food    [x] Coughing [] Alcohol        13. Number of caffeinated drinks per day: 0        14. Number of diet drinks per day: 0     Review of Systems   Constitutional:  Negative for appetite change, chills, fatigue and fever.   HENT: Negative for dental problem, ear pain, hearing loss, tinnitus, trouble swallowing and voice change.    Eyes: Negative for photophobia, pain and visual disturbance.   Respiratory: Negative for cough, chest tightness and shortness of breath.    Cardiovascular: Negative for chest pain, palpitations and leg swelling.   Gastrointestinal: Negative for abdominal pain, blood in stool, constipation, diarrhea, nausea and vomiting.   Endocrine: Negative for cold intolerance and heat intolerance.   Genitourinary: Negative for difficulty urinating, dysuria and urgency.   Musculoskeletal: Negative for arthralgias, back pain, gait problem, myalgias, neck pain and neck stiffness.   Skin: Negative for color change, pallor and rash.   Neurological: Positive for headaches. Negative for dizziness, tremors, seizures, syncope, facial asymmetry, speech difficulty, weakness, light-headedness and numbness.    Hematological: Negative for adenopathy. Does not bruise/bleed easily.   Psychiatric/Behavioral: Negative for agitation, behavioral problems, confusion, decreased concentration, self-injury, sleep disturbance and suicidal ideas. The patient is not nervous/anxious.          Past Medical History   Diagnosis Date    Abnormal liver enzymes     Anemia     Arthritis     Asthma     Azotemia     Diverticulosis     Esophageal reflux     H/O Nasal MRSA      Hepatitis, unspecified     Hypotension     Infectious mononucleosis     Insomnia     Instability of knee joint     Left Axillary Furuncle 1/26/16     Migraine     Obesity, unspecified     ALPHONSO on CPAP     Other and unspecified hyperlipidemia     Other seborrheic keratosis     Other specified disorder of male genital organs(608.89)     Pulmonary nodule     Renal stone     Seasonal allergic rhinitis     Snoring     Spontaneous ecchymoses     Unspecified essential hypertension     Unspecified vitamin D deficiency     Variants of migraine, not elsewhere classified, without mention of intractable migraine without mention of status migrainosus      Past Surgical History   Procedure Laterality Date    Appendectomy      [place] tooth extraction      Inguinal hernia repair      Wrist fracture surgery Left     Gastric sleeve  11/2012    Radiofrequency ablation       lumbar back    Esophageal dilation       2016    Transformal injections       x 2     Family History   Problem Relation Age of Onset    Hypertension Mother     Stroke Mother     Cancer Father      throat    Alcohol abuse Father     Heart disease Father     Heart attacks under age 50 Father 49     Social History     Social History    Marital status:      Spouse name: N/A    Number of children: N/A    Years of education: N/A     Occupational History    Not on file.     Social History Main Topics    Smoking status: Never Smoker    Smokeless tobacco: Not on file     Alcohol use No    Drug use: No    Sexual activity: Not on file     Other Topics Concern    Not on file     Social History Narrative     Allergies   Allergen Reactions    Tetracyclines Rash       Current Outpatient Medications     Current Outpatient Medications   Medication Sig    acetaminophen (TYLENOL EXTRA STRENGTH ORAL) Take 2 tablets by mouth 2 (two) times daily as needed.    amLODIPine (NORVASC) 5 MG tablet Take 1 tablet (5 mg total) by mouth once daily.    bioflav,lemon/vit Bcomp,C (LIPOFLAVOVIT ORAL) Take by mouth.    butalbital-acetaminophen-caff -40 mg -40 mg Cap Take 1 Tablet by mouth three times a day as needed for migraines    desloratadine (CLARINEX) 5 mg tablet Take 5 mg by mouth once daily.     dihydroergotamine (MIGRANAL) 0.5 mg/pump act. (4 mg/mL) nasal spray Use in one nostril as directed.  No more than 4 sprays in one hour  Please provide the patient with 90 day supply    eletriptan (RELPAX) 40 MG tablet Take 1 tablet by mouth, may repeat in 2 hours if necessary  Allow 18  tablets, a 90 day supply    eszopiclone (LUNESTA) 3 mg Tab TAKE 1 TABLET EVERY EVENING    ferrous sulfate 325 mg (65 mg iron) Tab tablet Take 325 mg by mouth 2 (two) times daily.     galcanezumab-gnlm (EMGALITY PEN) 120 mg/mL PnIj Inject 120 mg into the skin every 28 days.    ibuprofen (ADVIL,MOTRIN) 800 MG tablet Take 800 mg by mouth every 6 (six) hours as needed for Pain.    irbesartan (AVAPRO) 150 MG tablet TAKE 1 TABLET ONCE DAILY    lansoprazole (PREVACID) 30 MG capsule Take 30 mg by mouth once daily.    melatonin 10 mg Cap Take 1 capsule by mouth every evening.     metoclopramide HCl (REGLAN) 10 MG tablet Take 10 mg by mouth 4 (four) times daily.    montelukast (SINGULAIR) 10 mg tablet     ondansetron (ZOFRAN-ODT) 8 MG TbDL DISSOLVE 1 TABLET(8 MG) ON THE TONGUE EVERY 6 HOURS AS NEEDED    oxycodone-acetaminophen (PERCOCET)  mg per tablet Take 1 tablet by mouth.    polyethylene glycol  (GLYCOLAX) 17 gram/dose powder Take 17 g by mouth once daily.    QNASL 80 mcg/actuation HFAA 1 spray by Nasal route 2 (two) times daily.     riboflavin, vitamin B2, 400 mg Tab Take by mouth.    spironolactone (ALDACTONE) 25 MG tablet Take 1 tablet (25 mg total) by mouth once daily.    tiZANidine (ZANAFLEX) 4 MG tablet Take 1 tablet (4 mg total) by mouth 3 (three) times daily as needed.    UNABLE TO FIND medication name: lidocaine, prlocaine, lamotrigine, meloxicam cream apply as directed prn    vitamin D 1000 units Tab Take 5,000 Units by mouth once daily. Vitamin d3    zonisamide (ZONEGRAN) 100 MG Cap Take 2 capsules (200 mg total) by mouth once daily.     No current facility-administered medications for this visit.          Objective:      Vitals:    01/19/22 1002   BP: (!) 150/89   Pulse: 76   Resp: 17   Temp: 97.9 °F (36.6 °C)     Body mass index is 29.02 kg/m².    Constitutional:   Neurological Exam:  General: well-developed, well-nourished, no distress  Mental status: Awake and alert  Speech language: No dysarthria or aphasia on conversation  Cranial nerves: Face symmetric  Motor: Moves all extremities well  Coordination: No ataxia. No tremor.     Data review:    Lab Results   Component Value Date     01/15/2022    K 4.2 01/15/2022    MG 2.1 10/07/2021     01/15/2022    CO2 26 01/15/2022    BUN 18 01/15/2022    CREATININE 1.33 01/15/2022     01/15/2022    HGBA1C 5.4 10/07/2021    AST 51 01/15/2022    ALT 25 01/15/2022    ALBUMIN 4.2 01/15/2022    PROT 7.7 01/15/2022    BILITOT 0.3 01/15/2022    CHOL 139 10/07/2021    HDL 66 10/07/2021    LDLCALC 46.4 (L) 10/07/2021    TRIG 133 10/07/2021       Lab Results   Component Value Date    WBC 11.95 01/15/2022    HGB 14.1 01/15/2022    HCT 42.5 01/15/2022    MCV 97 01/15/2022     01/15/2022       Lab Results   Component Value Date    TSH 1.910 10/28/2021           Assessment and Plan     Migraine without Aura, chronic. On Zonisamide 200  mg daily, Amlodipine, and B2 400 mg daily for added prevention. For acute treatment continue with Zofran prn, and Relpax, alternating with Migranal and Ubrelvy. No butalbital for the last several months  Continue Sprix.    Failure to Nurtec QOD,  Aimovig and Emgality    Start Ajovy 225 mg SC q28 days  Continue Zonisamide 200 mg QHS      Medication Overuse Headache, has cut down from QID to BID.    Add Nerivio to be used at the onset of escalation, but within 60 minutes of the onset. Use for 45 minutes. Start at 12% stimulation and increase as tolerated up to sub-pain threshold.      RTC in 3 months with headache diary        Dana Conn M.D  Medical Director, Headache and Facial Pain  [facility]

## 2022-01-21 ENCOUNTER — TELEPHONE (OUTPATIENT)
Dept: PHARMACY | Facility: CLINIC | Age: 67
End: 2022-01-21
Payer: COMMERCIAL

## 2022-01-21 ENCOUNTER — PATIENT MESSAGE (OUTPATIENT)
Dept: NEUROLOGY | Facility: CLINIC | Age: 67
End: 2022-01-21
Payer: COMMERCIAL

## 2022-01-21 NOTE — TELEPHONE ENCOUNTER
Ajovy: APPROVED FROM 1/21/2022 TO 7/20/2022 with copayment of $5.[facility] [place] @[phone] will reach out to patient for further correspondence.

## 2022-02-09 DIAGNOSIS — G43.719 INTRACTABLE CHRONIC MIGRAINE WITHOUT AURA AND WITHOUT STATUS MIGRAINOSUS: Primary | ICD-10-CM

## 2022-02-09 RX ORDER — LASMIDITAN 50 MG/1
50 TABLET ORAL DAILY PRN
Qty: 8 TABLET | Refills: 11 | Status: SHIPPED | OUTPATIENT
Start: 2022-02-09 | End: 2022-04-28 | Stop reason: DRUGHIGH

## 2022-02-18 ENCOUNTER — TELEPHONE (OUTPATIENT)
Dept: PHARMACY | Facility: CLINIC | Age: 67
End: 2022-02-18
Payer: COMMERCIAL

## 2022-02-18 NOTE — TELEPHONE ENCOUNTER
Reyvow prescription has been APPROVED FROM 2/17/2022 TO 2/17/2024 with copayment of $349.77.       [facility] [place] @[phone] will reach out to patient for further correspondence.

## 2022-02-27 ENCOUNTER — PATIENT MESSAGE (OUTPATIENT)
Dept: NEUROLOGY | Facility: CLINIC | Age: 67
End: 2022-02-27
Payer: COMMERCIAL

## 2022-03-11 ENCOUNTER — TELEPHONE (OUTPATIENT)
Dept: PHARMACY | Facility: CLINIC | Age: 67
End: 2022-03-11
Payer: COMMERCIAL

## 2022-03-11 DIAGNOSIS — G43.719 INTRACTABLE CHRONIC MIGRAINE WITHOUT AURA AND WITHOUT STATUS MIGRAINOSUS: ICD-10-CM

## 2022-03-11 RX ORDER — LASMIDITAN 100 MG/1
100 TABLET ORAL DAILY PRN
Qty: 8 TABLET | Refills: 11 | Status: SHIPPED | OUTPATIENT
Start: 2022-03-11 | End: 2023-03-28 | Stop reason: SDUPTHER

## 2022-03-21 ENCOUNTER — TELEPHONE (OUTPATIENT)
Dept: NEUROLOGY | Facility: CLINIC | Age: 67
End: 2022-03-21
Payer: COMMERCIAL

## 2022-03-23 ENCOUNTER — TELEPHONE (OUTPATIENT)
Dept: PHARMACY | Facility: CLINIC | Age: 67
End: 2022-03-23
Payer: COMMERCIAL

## 2022-06-09 ENCOUNTER — TELEPHONE (OUTPATIENT)
Dept: HEMATOLOGY/ONCOLOGY | Facility: CLINIC | Age: 67
End: 2022-06-09
Payer: COMMERCIAL

## 2022-06-09 NOTE — NURSING
Reached out to patient to schedule an appt from his referral for anemia from Dr. Burch. [facility] with callback number.

## 2022-06-10 ENCOUNTER — TELEPHONE (OUTPATIENT)
Dept: HEMATOLOGY/ONCOLOGY | Facility: CLINIC | Age: 67
End: 2022-06-10
Payer: COMMERCIAL

## 2022-06-10 NOTE — NURSING
Reached back out to patient to schedule his hematology appt per referral from Dr. Burch for anemia. Pt offered first available appt of 6/21, but to better fit patient's work schedule, he opted for 6/24 with Fauzia Herring NP. Date, time, and location confirmed. All questions and concerns addressed at this time.

## 2022-06-24 ENCOUNTER — TELEPHONE (OUTPATIENT)
Dept: HEMATOLOGY/ONCOLOGY | Facility: CLINIC | Age: 67
End: 2022-06-24
Payer: COMMERCIAL

## 2022-06-24 NOTE — NURSING
Reached out to patient on behalf of Fauzia Herring NP to inform patient all of his repeat blood work has returned to normal and see if patient still wanted to come in to see hematology or if he wanted to cancel appt.  [facility] with callback number.

## 2022-06-27 ENCOUNTER — TELEPHONE (OUTPATIENT)
Dept: HEMATOLOGY/ONCOLOGY | Facility: CLINIC | Age: 67
End: 2022-06-27
Payer: COMMERCIAL

## 2022-06-27 NOTE — TELEPHONE ENCOUNTER
Called and spoke to pt.  He stated he spoke to a nurse from here at the  and was informed his labs were normal and did not have to be seen for anemia at this time and to f/u w/ PCP.  Pt stated he believes he knows what was dropping the iron levels and fixing the issue.  He said he will have repeat labs in about 3mths w/ his PCP.  Hem referral canceled at this time until further notice.

## 2022-08-09 ENCOUNTER — PATIENT MESSAGE (OUTPATIENT)
Dept: NEUROLOGY | Facility: CLINIC | Age: 67
End: 2022-08-09
Payer: COMMERCIAL

## 2022-08-11 ENCOUNTER — TELEPHONE (OUTPATIENT)
Dept: PHARMACY | Facility: CLINIC | Age: 67
End: 2022-08-11
Payer: COMMERCIAL

## 2022-09-26 ENCOUNTER — PATIENT MESSAGE (OUTPATIENT)
Dept: NEUROLOGY | Facility: CLINIC | Age: 67
End: 2022-09-26
Payer: COMMERCIAL

## 2022-09-26 DIAGNOSIS — G43.719 INTRACTABLE CHRONIC MIGRAINE WITHOUT AURA AND WITHOUT STATUS MIGRAINOSUS: ICD-10-CM

## 2022-09-27 ENCOUNTER — PATIENT MESSAGE (OUTPATIENT)
Dept: NEUROLOGY | Facility: CLINIC | Age: 67
End: 2022-09-27
Payer: COMMERCIAL

## 2022-09-27 RX ORDER — ELETRIPTAN HYDROBROMIDE 40 MG/1
TABLET, FILM COATED ORAL
Qty: 18 TABLET | Refills: 3 | Status: SHIPPED | OUTPATIENT
Start: 2022-09-27 | End: 2023-07-18 | Stop reason: SDUPTHER

## 2022-12-09 PROBLEM — Z95.5 STENTED CORONARY ARTERY: Status: ACTIVE | Noted: 2022-12-09

## 2023-01-17 DIAGNOSIS — G43.719 INTRACTABLE CHRONIC MIGRAINE WITHOUT AURA AND WITHOUT STATUS MIGRAINOSUS: ICD-10-CM

## 2023-01-18 RX ORDER — FREMANEZUMAB-VFRM 225 MG/1.5ML
225 INJECTION SUBCUTANEOUS
Qty: 1 EACH | Refills: 11 | Status: SHIPPED | OUTPATIENT
Start: 2023-01-18 | End: 2024-02-23 | Stop reason: SDUPTHER

## 2023-02-10 PROBLEM — E78.5 HYPERLIPIDEMIA: Status: ACTIVE | Noted: 2023-02-10

## 2023-03-28 DIAGNOSIS — G43.719 INTRACTABLE CHRONIC MIGRAINE WITHOUT AURA AND WITHOUT STATUS MIGRAINOSUS: ICD-10-CM

## 2023-03-28 RX ORDER — LASMIDITAN 100 MG/1
100 TABLET ORAL DAILY PRN
Qty: 8 TABLET | Refills: 11 | Status: CANCELLED | OUTPATIENT
Start: 2023-03-28

## 2023-03-29 RX ORDER — LASMIDITAN 100 MG/1
100 TABLET ORAL DAILY PRN
Qty: 8 TABLET | Refills: 11 | Status: SHIPPED | OUTPATIENT
Start: 2023-03-29 | End: 2024-03-13 | Stop reason: CLARIF

## 2023-06-16 DIAGNOSIS — G43.719 INTRACTABLE CHRONIC MIGRAINE WITHOUT AURA AND WITHOUT STATUS MIGRAINOSUS: ICD-10-CM

## 2023-06-16 RX ORDER — ZONISAMIDE 100 MG/1
200 CAPSULE ORAL DAILY
Qty: 20 CAPSULE | Refills: 0 | Status: SHIPPED | OUTPATIENT
Start: 2023-06-16 | End: 2023-08-01 | Stop reason: SDUPTHER

## 2023-06-16 RX ORDER — ZONISAMIDE 100 MG/1
200 CAPSULE ORAL DAILY
Qty: 180 CAPSULE | Refills: 0 | Status: SHIPPED | OUTPATIENT
Start: 2023-06-16 | End: 2023-08-01 | Stop reason: SDUPTHER

## 2023-07-15 DIAGNOSIS — G43.719 INTRACTABLE CHRONIC MIGRAINE WITHOUT AURA AND WITHOUT STATUS MIGRAINOSUS: ICD-10-CM

## 2023-07-17 ENCOUNTER — PATIENT MESSAGE (OUTPATIENT)
Dept: NEUROLOGY | Facility: CLINIC | Age: 68
End: 2023-07-17
Payer: COMMERCIAL

## 2023-07-17 RX ORDER — ELETRIPTAN HYDROBROMIDE 40 MG/1
TABLET, FILM COATED ORAL
Qty: 18 TABLET | Refills: 3 | OUTPATIENT
Start: 2023-07-17

## 2023-07-17 NOTE — TELEPHONE ENCOUNTER
Has not been seen in >1 year and since last refill has had CAD with stenting. I would not recommend this medicine and needs to discuss options when Dr. Conn returns. He is scheduled for follow up

## 2023-08-01 ENCOUNTER — OFFICE VISIT (OUTPATIENT)
Dept: NEUROLOGY | Facility: CLINIC | Age: 68
End: 2023-08-01
Payer: COMMERCIAL

## 2023-08-01 ENCOUNTER — PATIENT MESSAGE (OUTPATIENT)
Dept: NEUROLOGY | Facility: CLINIC | Age: 68
End: 2023-08-01

## 2023-08-01 VITALS
HEART RATE: 88 BPM | WEIGHT: 227.88 LBS | HEIGHT: 72 IN | RESPIRATION RATE: 17 BRPM | DIASTOLIC BLOOD PRESSURE: 99 MMHG | SYSTOLIC BLOOD PRESSURE: 170 MMHG | BODY MASS INDEX: 30.86 KG/M2

## 2023-08-01 DIAGNOSIS — I10 ESSENTIAL HYPERTENSION: ICD-10-CM

## 2023-08-01 DIAGNOSIS — G43.719 INTRACTABLE CHRONIC MIGRAINE WITHOUT AURA AND WITHOUT STATUS MIGRAINOSUS: ICD-10-CM

## 2023-08-01 DIAGNOSIS — M54.81 BILATERAL OCCIPITAL NEURALGIA: ICD-10-CM

## 2023-08-01 DIAGNOSIS — Z98.84 HISTORY OF BARIATRIC SURGERY: ICD-10-CM

## 2023-08-01 PROCEDURE — 3008F PR BODY MASS INDEX (BMI) DOCUMENTED: ICD-10-PCS | Mod: CPTII,S$GLB,, | Performed by: PSYCHIATRY & NEUROLOGY

## 2023-08-01 PROCEDURE — 3077F SYST BP >= 140 MM HG: CPT | Mod: CPTII,S$GLB,, | Performed by: PSYCHIATRY & NEUROLOGY

## 2023-08-01 PROCEDURE — 1125F AMNT PAIN NOTED PAIN PRSNT: CPT | Mod: CPTII,S$GLB,, | Performed by: PSYCHIATRY & NEUROLOGY

## 2023-08-01 PROCEDURE — 99215 PR OFFICE/OUTPT VISIT, EST, LEVL V, 40-54 MIN: ICD-10-PCS | Mod: S$GLB,,, | Performed by: PSYCHIATRY & NEUROLOGY

## 2023-08-01 PROCEDURE — 1159F MED LIST DOCD IN RCRD: CPT | Mod: CPTII,S$GLB,, | Performed by: PSYCHIATRY & NEUROLOGY

## 2023-08-01 PROCEDURE — 1101F PR PT FALLS ASSESS DOC 0-1 FALLS W/OUT INJ PAST YR: ICD-10-PCS | Mod: CPTII,S$GLB,, | Performed by: PSYCHIATRY & NEUROLOGY

## 2023-08-01 PROCEDURE — 1159F PR MEDICATION LIST DOCUMENTED IN MEDICAL RECORD: ICD-10-PCS | Mod: CPTII,S$GLB,, | Performed by: PSYCHIATRY & NEUROLOGY

## 2023-08-01 PROCEDURE — 99999 PR PBB SHADOW E&M-EST. PATIENT-LVL V: CPT | Mod: PBBFAC,,, | Performed by: PSYCHIATRY & NEUROLOGY

## 2023-08-01 PROCEDURE — 3077F PR MOST RECENT SYSTOLIC BLOOD PRESSURE >= 140 MM HG: ICD-10-PCS | Mod: CPTII,S$GLB,, | Performed by: PSYCHIATRY & NEUROLOGY

## 2023-08-01 PROCEDURE — 4010F ACE/ARB THERAPY RXD/TAKEN: CPT | Mod: CPTII,S$GLB,, | Performed by: PSYCHIATRY & NEUROLOGY

## 2023-08-01 PROCEDURE — 1101F PT FALLS ASSESS-DOCD LE1/YR: CPT | Mod: CPTII,S$GLB,, | Performed by: PSYCHIATRY & NEUROLOGY

## 2023-08-01 PROCEDURE — 3288F FALL RISK ASSESSMENT DOCD: CPT | Mod: CPTII,S$GLB,, | Performed by: PSYCHIATRY & NEUROLOGY

## 2023-08-01 PROCEDURE — 3288F PR FALLS RISK ASSESSMENT DOCUMENTED: ICD-10-PCS | Mod: CPTII,S$GLB,, | Performed by: PSYCHIATRY & NEUROLOGY

## 2023-08-01 PROCEDURE — 3080F PR MOST RECENT DIASTOLIC BLOOD PRESSURE >= 90 MM HG: ICD-10-PCS | Mod: CPTII,S$GLB,, | Performed by: PSYCHIATRY & NEUROLOGY

## 2023-08-01 PROCEDURE — 99215 OFFICE O/P EST HI 40 MIN: CPT | Mod: S$GLB,,, | Performed by: PSYCHIATRY & NEUROLOGY

## 2023-08-01 PROCEDURE — 4010F PR ACE/ARB THEARPY RXD/TAKEN: ICD-10-PCS | Mod: CPTII,S$GLB,, | Performed by: PSYCHIATRY & NEUROLOGY

## 2023-08-01 PROCEDURE — 3008F BODY MASS INDEX DOCD: CPT | Mod: CPTII,S$GLB,, | Performed by: PSYCHIATRY & NEUROLOGY

## 2023-08-01 PROCEDURE — 1125F PR PAIN SEVERITY QUANTIFIED, PAIN PRESENT: ICD-10-PCS | Mod: CPTII,S$GLB,, | Performed by: PSYCHIATRY & NEUROLOGY

## 2023-08-01 PROCEDURE — 3080F DIAST BP >= 90 MM HG: CPT | Mod: CPTII,S$GLB,, | Performed by: PSYCHIATRY & NEUROLOGY

## 2023-08-01 PROCEDURE — 99999 PR PBB SHADOW E&M-EST. PATIENT-LVL V: ICD-10-PCS | Mod: PBBFAC,,, | Performed by: PSYCHIATRY & NEUROLOGY

## 2023-08-01 RX ORDER — ZONISAMIDE 100 MG/1
200 CAPSULE ORAL DAILY
Qty: 180 CAPSULE | Refills: 3 | Status: SHIPPED | OUTPATIENT
Start: 2023-08-01 | End: 2023-10-13 | Stop reason: SDUPTHER

## 2023-08-01 RX ORDER — RIMEGEPANT SULFATE 75 MG/75MG
TABLET, ORALLY DISINTEGRATING ORAL
Qty: 16 TABLET | Refills: 11 | Status: SHIPPED | OUTPATIENT
Start: 2023-08-01

## 2023-08-01 RX ORDER — ZONISAMIDE 100 MG/1
200 CAPSULE ORAL DAILY
Qty: 180 CAPSULE | Refills: 3 | Status: ON HOLD | OUTPATIENT
Start: 2023-08-01 | End: 2024-03-04 | Stop reason: HOSPADM

## 2023-08-01 RX ORDER — DOXEPIN HYDROCHLORIDE 10 MG/1
10 CAPSULE ORAL NIGHTLY
Status: ON HOLD | COMMUNITY
Start: 2023-07-24 | End: 2024-03-04 | Stop reason: HOSPADM

## 2023-08-01 RX ORDER — BUPROPION HYDROCHLORIDE 300 MG/1
300 TABLET ORAL DAILY
COMMUNITY
Start: 2023-07-14

## 2023-08-01 RX ORDER — ELETRIPTAN HYDROBROMIDE 40 MG/1
TABLET, FILM COATED ORAL
Qty: 18 TABLET | Refills: 3 | Status: SHIPPED | OUTPATIENT
Start: 2023-08-01 | End: 2023-08-01 | Stop reason: SDUPTHER

## 2023-08-01 RX ORDER — ELETRIPTAN HYDROBROMIDE 40 MG/1
TABLET, FILM COATED ORAL
Qty: 18 TABLET | Refills: 3 | Status: SHIPPED | OUTPATIENT
Start: 2023-08-01 | End: 2024-03-13 | Stop reason: CLARIF

## 2023-08-02 NOTE — PROGRESS NOTES
"Subjective:       Patient ID: Booker Back is a 67 y.o. male.    Chief Complaint: Headache    INTERVAL HISTORY 8/1/2023  The patient presents for follow up after a long hiatus. He is "the same." He recognizes that he suffers with MOH as the headache recurs after the abortive agent wears off. He takes Oxycontin BID and Percocet plus Tylenol plus Ibuprofen, plus Relpax and Reyvow. He tries to stay under 4,000 mg of Tylenol. For prevention he has tried multiple medications, currently on Ajovy. No change in habitual headache pattern. He presents to discuss strategies to break the MOH cycle.    INTERVAL HISTORY 1/19/2022  The patient presents for a follow up. He is stable. Emgality had decreased the intensity but not the frequency eventually lost efficacy completely. He passed out on 1/15 while sitting in the bathroom and hit his forehead causing a large laceration requiring multiple stitches. CT of head negative. He is still in medication overuse from OTC analgesics. In addition to the Tylenol and the opioid medication given by pain medicine. Moreover, he suffers with long Covid, unable to work. 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 He alternates medications for acute attack including Ubrelvy, Relpax, and OTC. He is acutely aware of MOH and is on a constant struggle to decrease use. Otherwise information below is still accurate and current.    INTERVAL HISTORY 1/21/2021  The patient presents for a follow up virtual visit. He is stable. Emgality has decreased the intensity but not the frequency which is still daily. He is still in medication overuse from OTC analgesics. Tylenol in addition to the Tylenol in the opioid medication. Otherwise information below is still accurate and current. He alternates medications for acute attack including Ubrelvy, Relpax, and OTC. He is acutely aware of MOH and is on a constant struggle to decrease use. Otherwise information below is still accurate and current.    INTERVAL HISTORY " 10/22//2020  The patient presents for a follow up virtual visit. He is doing well and having headache free since on Emgality. However, it is only a couple of days per month. He is still in medication overuse from OTC analgesics. Otherwise information below is still accurate and current. He alternate medications for acute attack including Ubrelvy, Relpax, and OTC. He is acutely aware of MOH and is on a constant struggle to decrease use. Otherwise information below is still accurate and current.    INTERVAL HISTORY 4/20/2020  The patient presents for a follow up virtual visit. He is doing well and having headache frees since on Emgality. He is due for the next dose in the immediate future. He is  However, he is still in medication overuse from OTC analgesics. Otherwise information below is still accurate and current.      INTERVAL HISTORY 1/20/2020  The patient comes for follow up. He is stable. He has tried Emgality for 4 months and is beginning to see an improvement. He went to [place] over [place], forgot his Oxycodone and obtained #9 tabs from the ED to hold him until back home. Not taken Butalbital for 4 months. He is here to discuss treatment optimization. Otherwise information below is still accurate and current.      INTERVAL HISTORY 7/15/2019  The patient comes for follow up. He has a number of issues to discuss today and comes with a list of questions. His headaches are near daily or daily but he sees an improvement in that, he uses medication BID instead of QID for escalations on a daily basis. He states that recently, he went on a cruise to [place]. His wife forgot her OxyContin. He shared his Oxycodone with her and as a result he did not have enough for himself. The cruise physician gave them a prescription toward the end of the trip. He uses limited amounts of Relpax and Migranal. He is aware of the overall limit of treating escalations 10 days per month. He is on Aimovig 140 mg q28 days and Zonisamide  200 mg daily.    INTERVAL HISTORY 3/26/2019  The patient comes for follow up. He is benefiting from Aimovig, now at 140 mg every 28 days. He also benefited from Cymbalta but when he went to 60 mg he became very sedated. He is aware that he is in medication overuse headache and is perpetually trying to come off it. He would like to try strategies to break the cycle. He is here to discus options    INTERVAL HISTORY  The patient comes for follow up. He is significantly better. After taking Aimovig 70 mg, he reports about 60% overall improvement. He has noted some improvement with Gammacore but the headache tends to recur. He manages escalations starting with Zofran, then either Relpax or Migranal and rescues with Fioricet. He averages 10 Fioricet per month. He is also on Percocet for his back pain. He has not taken Percocet for 4 days in a row, 2 out of those days he had a headache. Last July, he had aphakia OS, ever since he has intermittent conjunctival redness. He was in a  tour this summer and did quite well overall.    HPI    The patient is a pleasant 60 y/o male with long time history of headaches. Over the years he has seen neurologists headache specialists and has received expert care. In addition to experiencing tension typt headache and migraine headache, he describes an episode of Ophthalmic Migraine, characterized by loss of vision lasting 2 to 3 hours, not followed by head pain which occurred 10 to 15 years prior to two episodes of Transient [employer] Amnesia. They happened in 2013 and they were about a month apart. He was admitted to the hospital where imaging was obtained as well as EEG all of which was reportedly negative. He was given the diagnosis of TGA, ex juvantivus. He states that it was after the episodes of TGA when he started having migraine headaches. About 12 years ago he was diagnosed with Medication overuse headache, he was taking Advil 400 mg twice daily. He saw Dr. Nettie Elena who  "advised him to stop taking Advil. His headaches were resolved but his back pain flared up. His back pain was so severe that he started taking Advil again resulting in headache relapse. In order to treat his back pain he underwent interventional procedures including MBB and RFA without significant relief. When offered a spinal cord stimulator he pursued a second opinion. Instead of having the SCS placed he decided to undergo trans foraminal injections. When done at one level worked about 30% but when given at 3 levels it worked very well. He ascribes a lot of his headaches to "sinus problems." He had 4 headaches last month of October, thus far he has had 4 headaches in November. This increased is attributed to recent sinus surgery. His past medical history is also noteworthy for bariatric surgery, a sleeve stomach reduction which has resulted in profound weight loss. He is overall pleased on his current regime which includes Zonisamide 200 mg daily, Zofran 8 mg prn, Migranal alternating with Relpax and limited doses of Fioricet for rescue. For prevention, he has also tried Gabapentin for 1 month, poorly tolerated. Botox, 3 rounds by Dr. Ky Jensen provided no relief.  He is not a candidate for Topamax because of risk of kidney stones. He cannot take Beta blockers because of history of asthma. He has taken Elavil in the past for reactive depression with no effect on his headaches.   Please see details of headache characteristics headache below.  Headache questionnaire     1. When did your Headaches start?    Age 9     2. Where are your headaches located?   Usually left temple      3. Your headache's characteristics:  Excruciating, Pressure, Throbbing, Pounding, Stabbing, Like a tight band        4. How long does the headache last?  Hours, days     5. How often does the headache occur?  Worst when sinus problems        6. Are your headaches preceded or accompanied by other symptoms? yes  If yes, please describe. " Sometimes photophobia, occasional aura        7. Does the headache awaken you at night? no  If so, how often?            8. Please laura the word that best describes your headache's intensity:    moderate        9. Using a scale of 1 through 10, with 0 = no pain and 10 = the worst pain:  What score is your headache now? 0  What score is your headache at its worst? 9  What score is your headache at its best? 1         10. Possible associated headache symptoms:  [x]  Sensitivity to light  [] Dizziness  [x] Nasal or sinus pressure/ pain   [x] Sensitivity to noise  [] Vertigo  [] Problems with concentration  [] Sensitivity to smells  [] Ringing in ears  [] Problems with memory    [] Blurred vision  [x] Irritability  [x] Problems with task completion    [] Double vision  [] Anger  [x]  Problems with relaxation  [x] Loss of appetite  [] Anxiety  [x] Neck tightness, Neck pain  [x] Nausea   [x] Nasal congestion  [] Vomiting           11. Headache improving factors:  [x] Sleep  [] Heat  [x] Darkness  [x] Ice  [x] Local pressure [] Menses (period)  [] Massage   [x] Medications:         12. Headache worsening factors:    [x] Fatigue [x] Sneezing  [x] Changes in Weather  [x] Light [] Bending Over [] Stress  [x] Noise [] Ovulation  [] Multiple Sclerosis Flare-Up  [] Smells  [] Menses  [] Food    [x] Coughing [] Alcohol        13. Number of caffeinated drinks per day: 0        14. Number of diet drinks per day: 0     Review of Systems   Constitutional:  Negative for appetite change, chills, fever. Positive for severe fatigue since Covid infection  HENT: Negative for dental problem, ear pain, hearing loss, tinnitus, trouble swallowing and voice change.    Eyes: Negative for photophobia, pain and visual disturbance.   Respiratory: Negative for cough, chest tightness and shortness of breath.    Cardiovascular: Negative for chest pain, palpitations and leg swelling.   Gastrointestinal: Negative for abdominal pain, blood in stool,  constipation, diarrhea, nausea and vomiting.   Endocrine: Negative for cold intolerance and heat intolerance.   Genitourinary: Negative for difficulty urinating, dysuria and urgency.   Musculoskeletal: Negative for arthralgias, back pain, gait problem, myalgias, neck pain and neck stiffness.   Skin: Negative for color change, pallor and rash.   Neurological: Positive for headaches. Negative for dizziness, tremors, seizures, syncope, facial asymmetry, speech difficulty, weakness, light-headedness and numbness.   Hematological: Negative for adenopathy. Does not bruise/bleed easily.   Psychiatric/Behavioral: Negative for agitation, behavioral problems, confusion, decreased concentration, self-injury, sleep disturbance and suicidal ideas. The patient is not nervous/anxious.          Past Medical History   Diagnosis Date    Abnormal liver enzymes     Anemia     Arthritis     Asthma     Azotemia     Diverticulosis     Esophageal reflux     H/O Nasal MRSA      Hepatitis, unspecified     Hypotension     Infectious mononucleosis     Insomnia     Instability of knee joint     Left Axillary Furuncle 1/26/16     Migraine     Obesity, unspecified     ALPHONSO on CPAP     Other and unspecified hyperlipidemia     Other seborrheic keratosis     Other specified disorder of male genital organs(608.89)     Pulmonary nodule     Renal stone     Seasonal allergic rhinitis     Snoring     Spontaneous ecchymoses     Unspecified essential hypertension     Unspecified vitamin D deficiency     Variants of migraine, not elsewhere classified, without mention of intractable migraine without mention of status migrainosus      Past Surgical History   Procedure Laterality Date    Appendectomy      [place] tooth extraction      Inguinal hernia repair      Wrist fracture surgery Left     Gastric sleeve  11/2012    Radiofrequency ablation       lumbar back    Esophageal dilation       2016    Transformal injections       x 2     Family History   Problem  Relation Age of Onset    Hypertension Mother     Stroke Mother     Cancer Father      throat    Alcohol abuse Father     Heart disease Father     Heart attacks under age 50 Father 49     Social History     Social History    Marital status:      Spouse name: N/A    Number of children: N/A    Years of education: N/A     Occupational History    Not on file.     Social History Main Topics    Smoking status: Never Smoker    Smokeless tobacco: Not on file    Alcohol use No    Drug use: No    Sexual activity: Not on file     Other Topics Concern    Not on file     Social History Narrative     Allergies   Allergen Reactions    Tetracyclines Rash       Current Outpatient Medications     Current Outpatient Medications   Medication Sig    acetaminophen (TYLENOL) 500 MG tablet Take 1 tablet by mouth 4 (four) times daily as needed for Pain.     albuterol (PROVENTIL/VENTOLIN HFA) 90 mcg/actuation inhaler Inhale 1 puff into the lungs as needed.    amLODIPine (NORVASC) 10 MG tablet TAKE 1 TABLET(10 MG) BY MOUTH EVERY DAY    atorvastatin (LIPITOR) 10 MG tablet Take 1 tablet (10 mg total) by mouth once daily.    butalbital-acetaminophen-caff -40 mg Cap Take 1 capsule by mouth every 6 (six) hours as needed (pt only takes QOD PRN).    carvediloL (COREG) 3.125 MG tablet Take 1 tablet (3.125 mg total) by mouth 2 (two) times daily with meals.    chlorpheniramine (CHLOR-TRIMETON) 4 mg tablet Take 4 mg by mouth 2 (two) times daily as needed for Allergies.     clopidogreL (PLAVIX) 75 mg tablet TAKE 1 TABLET(75 MG) BY MOUTH EVERY DAY    desloratadine (CLARINEX) 5 mg tablet Take 5 mg by mouth every evening.     dicyclomine (BENTYL) 10 MG capsule Take 10 mg by mouth 3 (three) times daily as needed.    DUPIXENT  mg/2 mL PnIj Inject 300 mg into the skin every 14 (fourteen) days.    eszopiclone (LUNESTA) 3 mg Tab TAKE 1 TABLET EVERY EVENING    fremanezumab-vfrm (AJOVY SYRINGE) 225 mg/1.5 mL injection Inject 1.5 mLs (225 mg  total) into the skin every 28 days.    ibuprofen (ADVIL,MOTRIN) 600 MG tablet Take 600 mg by mouth every 8 (eight) hours as needed for Pain.    irbesartan (AVAPRO) 300 MG tablet Take 1 tablet (300 mg total) by mouth once daily.    lansoprazole (PREVACID) 30 MG capsule Take 30 mg by mouth 2 (two) times a day.     lasmiditan (REYVOW) tablet 100 mg Take one tablet (100 mg) by mouth daily as needed (migraine).    levalbuterol tartrate (XOPENEX HFA INHL) Inhale into the lungs as needed.    melatonin 10 mg Tab Take 10 mg by mouth every evening.    montelukast (SINGULAIR) 10 mg tablet Take 10 mg by mouth every evening.     multivitamin-min-iron-FA-vit K (BARIATRIC MULTIVITAMINS) 45 mg iron- 800 mcg-120 mcg Cap Take 1 tablet by mouth every evening.     ondansetron (ZOFRAN-ODT) 8 MG TbDL Take 1 tablet (8 mg total) by mouth every 8 (eight) hours as needed (nausea).    oxyCODONE-acetaminophen (PERCOCET)  mg per tablet Take 1 tablet by mouth 4 (four) times daily as needed.    OXYCONTIN 10 mg 12 hr tablet Take 10 mg by mouth every 12 (twelve) hours.     polyethylene glycol (GLYCOLAX) 17 gram/dose powder Take 17 g by mouth every evening.     QNASL 80 mcg/actuation HFAA 1 spray by Nasal route 2 (two) times daily.     tiZANidine (ZANAFLEX) 4 MG tablet 1 tablet by mouth 3 to 4 times daily as needed for spasms and pain    tiZANidine (ZANAFLEX) 4 MG tablet TAKE 1 TABLET 3 TO 4 TIMES DAILY AS NEEDED FOR SPASMS AND PAIN    aspirin (ECOTRIN) 81 MG EC tablet Take 1 tablet (81 mg total) by mouth once daily.    buPROPion (WELLBUTRIN XL) 300 MG 24 hr tablet Take 300 mg by mouth.    doxepin (SINEQUAN) 10 MG capsule Take 10 mg by mouth every evening.    eletriptan (RELPAX) 40 MG tablet TAKE 1 TABLET BY MOUTH AS NEEDED FOR MIGRAINE. MAY REPEAT ONCE IN 2 HOURS. NO MORE THAN 10 DAYS PER MONTH    rimegepant (NURTEC) 75 mg odt One dissolving tablet on the tongue daily as needed for migraine. No more than once per day.    zonisamide (ZONEGRAN)  100 MG Cap Take 2 capsules (200 mg total) by mouth once daily.    zonisamide (ZONEGRAN) 100 MG Cap Take 2 capsules (200 mg total) by mouth once daily.     No current facility-administered medications for this visit.         Objective:      Vitals:    08/01/23 1304   BP: (!) 170/99   Pulse: 88   Resp: 17     Body mass index is 30.9 kg/m².      Constitutional:   Neurological Exam:  General: well-developed, well-nourished, no distress  Mental status: Awake and alert  Speech language: No dysarthria or aphasia on conversation  Cranial nerves: Face symmetric  Motor: Moves all extremities well  Coordination: No ataxia. No tremor.     Data review:  Lab Results   Component Value Date     04/07/2023    K 4.1 04/07/2023    MG 2.1 04/07/2023     04/07/2023    CO2 20 (L) 04/07/2023    BUN 17 04/07/2023    CREATININE 1.27 04/07/2023    GLU 86 04/07/2023    HGBA1C 5.4 06/21/2022    AST 37 04/07/2023    ALT 26 04/07/2023    ALBUMIN 4.2 04/07/2023    PROT 7.4 04/07/2023    BILITOT 0.5 04/07/2023    CHOL 168 12/27/2022    CHOL 168 12/27/2022    HDL 70 12/27/2022    HDL 70 12/27/2022    LDLCALC 66.2 12/27/2022    LDLCALC 66.2 12/27/2022    TRIG 159 (H) 12/27/2022    TRIG 159 (H) 12/27/2022       Lab Results   Component Value Date    WBC 8.49 04/07/2023    HGB 13.3 (L) 04/07/2023    HCT 41.3 04/07/2023    MCV 97 04/07/2023     04/07/2023       Lab Results   Component Value Date    TSH 1.910 10/28/2021           Assessment and Plan     Migraine without Aura, chronic, refractory to multiple interventions. On Zonisamide 200 mg daily, Amlodipine, and B2 400 mg daily for added prevention.   Medication overuse headache, treats daily with multiple doses of Tylenol, Percocet, Ibuprofen, weekly with Relpax and Reyvow. Blood work from last spring reveals normal LFT.  We have had a long discussion regarding the importance of breaking the cycle. We discussed repeated IV infusions versus Nurtec.    While Nurtec and Ubrelvy were  ineffective in the past for acute treatment, I would like to use Nurtec to break the cycle in a tapering schedule. He is to stop all Tylenol and Ibuprofen. He can still treat weekly with Relpax or reyvow but no more than twice a week.  Continue Ajovy 225 mg SC q28 days, failed Aimovig and Emgality. Consider Qulipta and Vyepti  Continue Zonisamide 200 mg QHS     RTC in 6 months with headache diary    I spent 40 minutes on the day of this encounter preparing, treating, and managing this patient with intractable migraine headaches, medication overuse headaches, and multiple co-morbidities        Dana Conn M.D  Medical Director, Headache and Facial Pain  [facility]

## 2023-08-09 ENCOUNTER — TELEPHONE (OUTPATIENT)
Dept: NEUROLOGY | Facility: CLINIC | Age: 68
End: 2023-08-09
Payer: COMMERCIAL

## 2023-08-28 ENCOUNTER — PATIENT MESSAGE (OUTPATIENT)
Dept: NEUROLOGY | Facility: CLINIC | Age: 68
End: 2023-08-28
Payer: COMMERCIAL

## 2023-09-07 ENCOUNTER — PATIENT MESSAGE (OUTPATIENT)
Dept: NEUROLOGY | Facility: CLINIC | Age: 68
End: 2023-09-07
Payer: COMMERCIAL

## 2023-09-21 ENCOUNTER — TELEPHONE (OUTPATIENT)
Dept: NEUROLOGY | Facility: CLINIC | Age: 68
End: 2023-09-21
Payer: COMMERCIAL

## 2023-10-13 PROBLEM — M79.2 NEUROGENIC PAIN, LEG, LEFT: Status: ACTIVE | Noted: 2023-10-13

## 2024-02-19 DIAGNOSIS — G43.719 INTRACTABLE CHRONIC MIGRAINE WITHOUT AURA AND WITHOUT STATUS MIGRAINOSUS: ICD-10-CM

## 2024-02-19 RX ORDER — FREMANEZUMAB-VFRM 225 MG/1.5ML
225 INJECTION SUBCUTANEOUS
Qty: 1 EACH | Refills: 11 | OUTPATIENT
Start: 2024-02-19

## 2024-02-23 ENCOUNTER — PATIENT MESSAGE (OUTPATIENT)
Dept: NEUROLOGY | Facility: CLINIC | Age: 69
End: 2024-02-23
Payer: MEDICARE

## 2024-02-23 DIAGNOSIS — G43.719 INTRACTABLE CHRONIC MIGRAINE WITHOUT AURA AND WITHOUT STATUS MIGRAINOSUS: ICD-10-CM

## 2024-02-23 RX ORDER — FREMANEZUMAB-VFRM 225 MG/1.5ML
225 INJECTION SUBCUTANEOUS
Qty: 1 EACH | Refills: 11 | Status: SHIPPED | OUTPATIENT
Start: 2024-02-23 | End: 2024-03-22

## 2024-02-28 PROBLEM — G93.41 ACUTE METABOLIC ENCEPHALOPATHY: Status: ACTIVE | Noted: 2024-02-28

## 2024-02-28 PROBLEM — A41.9 SEPSIS: Status: ACTIVE | Noted: 2024-02-28

## 2024-02-28 PROBLEM — N17.9 ARF (ACUTE RENAL FAILURE): Status: ACTIVE | Noted: 2024-02-28

## 2024-02-28 PROBLEM — Z71.89 ACP (ADVANCE CARE PLANNING): Status: ACTIVE | Noted: 2024-02-28

## 2024-02-29 PROBLEM — N30.00 ACUTE CYSTITIS: Status: ACTIVE | Noted: 2024-02-29

## 2024-03-01 PROBLEM — A41.9 SEPSIS: Status: RESOLVED | Noted: 2024-02-28 | Resolved: 2024-03-01

## 2024-03-03 PROBLEM — E87.6 HYPOKALEMIA: Status: ACTIVE | Noted: 2024-03-03

## 2024-03-03 PROBLEM — G93.40 ACUTE ENCEPHALOPATHY: Status: ACTIVE | Noted: 2024-02-28

## 2024-03-04 PROBLEM — G93.41 ACUTE METABOLIC ENCEPHALOPATHY: Status: ACTIVE | Noted: 2024-03-04

## 2024-03-06 ENCOUNTER — OFFICE VISIT (OUTPATIENT)
Dept: UROLOGY | Facility: CLINIC | Age: 69
End: 2024-03-06
Payer: MEDICARE

## 2024-03-06 VITALS — WEIGHT: 216.94 LBS | BODY MASS INDEX: 29.38 KG/M2 | HEIGHT: 72 IN

## 2024-03-06 DIAGNOSIS — N40.1 BENIGN PROSTATIC HYPERPLASIA WITH WEAK URINARY STREAM: Primary | ICD-10-CM

## 2024-03-06 DIAGNOSIS — R39.12 BENIGN PROSTATIC HYPERPLASIA WITH WEAK URINARY STREAM: Primary | ICD-10-CM

## 2024-03-06 DIAGNOSIS — Z87.442 HISTORY OF KIDNEY STONES: ICD-10-CM

## 2024-03-06 PROCEDURE — 99214 OFFICE O/P EST MOD 30 MIN: CPT | Mod: PBBFAC,25,PO

## 2024-03-06 PROCEDURE — 99999 PR PBB SHADOW E&M-EST. PATIENT-LVL IV: CPT | Mod: PBBFAC,,,

## 2024-03-06 PROCEDURE — 99214 OFFICE O/P EST MOD 30 MIN: CPT | Mod: S$PBB,,,

## 2024-03-06 PROCEDURE — 99999PBSHW PR PBB SHADOW TECHNICAL ONLY FILED TO HB: Mod: PBBFAC,,,

## 2024-03-06 PROCEDURE — 51798 US URINE CAPACITY MEASURE: CPT | Mod: PBBFAC,PO

## 2024-03-06 RX ORDER — TAMSULOSIN HYDROCHLORIDE 0.4 MG/1
0.4 CAPSULE ORAL DAILY
Qty: 90 CAPSULE | Refills: 3 | Status: SHIPPED | OUTPATIENT
Start: 2024-03-06 | End: 2024-03-08 | Stop reason: SDUPTHER

## 2024-03-07 NOTE — PROGRESS NOTES
[facility] Note  Staff: TIFFANI Galvez    PCP: MD Kiersten    Chief Complaint: Hospital Follow Up    Subjective:        HPI: Booker Back is a 68 y.o. male NEW PATIENT presents today for hospital follow up. He was recently admitted for altered mental status on 2024:    68-year-old male presented to the ER today with complaints of altered mental status and hallucinations. Of note his wife  2 months prior. Patient lives alone  called his son today because the patient was acting strange. Apparently he received IV antibiotics few days prior by his primary care for UTI was supposed to go back but did not returned. There has been reported decreased urination. Son did not know exact details about infection or antibiotics. Workup in the ER with elevated white cell count no obvious source seen by UA not able to be obtained due to decreased urination. Noted AFSHIN on labs procalcitonin elevated. Started on broad-spectrum antibiotics. Hospital medicine consulted for admission. Patient was seen in the ER. Discussed with ER physician.     Hospital Course:   Patient was admitted to ICU with acute encephalopathy with hallucinations (unsure if metabolic vs toxic) and acute renal failure with mild elev of osmolar gap (? Ingestion).  Started on broad-spectrum antibiotics for presumptive UTI.  He completed Abx's for UTI, urine cx with NG.  Initially BP unresponsive to fluid bolus in the ER pressors were prescribed.  Fortunately patient's BP eventually saline responsive did not require pressors.  Critical Care was consulted.  Nephrology was consulted for AFSHIN. Patient's mentation had improved he was stepped down to telemetry.  Of note patient incidentally tested positive for COVID-19 pneumonia but has been diagnosed as COVID long-hauler. He did require supplemental oxygen however was not in respiratory distress.  He was not treated with steroids and was not given remdesivir due to AFSHIN.   Oxygenation was weaned to room air.  AFSHIN and metabolic acidosis resolved with hydration and supportive care.  Patient denied ingestion.  Ethanol level and ethylene glycol were negative.  Flomax was added and rosa cath was removed.  He will need outpt f/u with urology.  Suspect presentation was due to polypharmacy (multiple Rx for migraine, insomnia, narcotics, muscle relaxants) and UTI.  Pt now requesting large quantities of pain medications.  He was seen by psychiatry for depression, recs for wellbutrin, depakote and remeron.  Per psychiatry, patient is OK for discharge, should continue this regimen upon going home.  Patient was counseled about discussing with prescribing physician need for multiple sedatives- as this puts him at risk of toxic encephalopathy.  He also had hypokalemia, K was replace.  Home losartan and K to be resumed.  A BMP to be ordered in  few days.  PT/OT recs for low intensity tx.  He will be discharge home with HH.  He can f/u with PCP and psychiatry as an outpatient.     He states a rosa catheter was placed while in the hospital, not because of urinary retention, but because he was unable to use the bedside commode. ??- I do not see record of bladder scan done in the hospital    He states while in the hospital he was started on tamsulosin 0.4mg daily. He states he has noticed his urinary stream has gotten weaker and slower. He states his urinary stream greatly improved when taking tamsulosin. He was not discharged home with this medication but would like to continue. He denies dysuria, hematuria, fever, flank pain, incontinence, and difficulty urinating. He does have a history of kidney stones.     Questions asked the pt during ov today:  Dysuria: No  Gross Hematuria:No  Straining:No, Hesistancy:No, Intermittency:No}, Weak stream:Yes    Last PSA Screening:   Lab Results   Component Value Date    PSA 0.18 12/27/2022    PSA 0.57 06/18/2021    PSA 0.29 12/24/2018       History of Kidney  Stones?:  Yes    Constipation issues?:  No    PVR by bladder scan performed by MA today:  0 mL    REVIEW OF SYSTEMS:  Review of Systems   Constitutional: Negative.  Negative for chills and fever.   HENT: Negative.     Eyes: Negative.    Respiratory:  Positive for cough.    Cardiovascular: Negative.    Gastrointestinal: Negative.  Negative for abdominal pain, constipation, diarrhea, nausea and vomiting.   Genitourinary: Negative.  Negative for dysuria, flank pain, frequency, hematuria and urgency.   Musculoskeletal: Negative.  Negative for back pain.   Skin: Negative.    Neurological: Negative.    Endo/Heme/Allergies: Negative.    Psychiatric/Behavioral:  Positive for depression.        PMHx:  Past Medical History:   Diagnosis Date    Abnormal liver enzymes     Anemia     Anticoagulant long-term use     Arthritis     Asthma     Azotemia     Chronic back pain     Chronic migraine without aura, with intractable migraine, so stated, with status migrainosus     COVID-19 long hauler manifesting chronic fatigue     Diverticulosis     Esophageal reflux     H/O Nasal MRSA      Hepatitis, unspecified     Hypotension     Infectious mononucleosis     Insomnia     Instability of knee joint     Left Axillary Furuncle 1/26/16     LUQ Abdominal Pain     Migraine     Nausea     Obesity, unspecified     ALPHONSO on CPAP     Other seborrheic keratosis     Other specified disorder of male genital organs(608.89)     Pulmonary nodule     Renal stone     Seasonal allergic rhinitis     Snoring     Spontaneous ecchymoses     Unspecified vitamin D deficiency     Variants of migraine, not elsewhere classified, without mention of intractable migraine without mention of status migrainosus        PSHx:  Past Surgical History:   Procedure Laterality Date    APPENDECTOMY      CATARACT EXTRACTION, BILATERAL Bilateral     COLONOSCOPY  12/06/2021    Dr. Diallo    CORONARY ANGIOPLASTY WITH STENT PLACEMENT N/A 12/27/2021    Procedure: ANGIOPLASTY, CORONARY  ARTERY, WITH STENT INSERTION;  Surgeon: Richmond Giron MD;  Location: [facility] CATH;  Service: Cardiology;  Laterality: N/A;    EPIDURAL STEROID INJECTION INTO LUMBAR SPINE      ESOPHAGEAL DILATION      2016    ESOPHAGOGASTRODUODENOSCOPY N/A 11/6/2020    Procedure: EGD (ESOPHAGOGASTRODUODENOSCOPY);  Surgeon: Jai Gutierrez MD;  Location: [facility] ENDO;  Service: Endoscopy;  Laterality: N/A;    gastric sleeve  11/2012    INGUINAL HERNIA REPAIR      LEFT HEART CATHETERIZATION Left 12/27/2021    Procedure: Left heart cath;  Surgeon: Richmond Giron MD;  Location: [facility] CATH;  Service: Cardiology;  Laterality: Left;    LITHOTRIPSY      RADIOFREQUENCY ABLATION      lumbar back    RHIZOTOMY      lumbar     RHIZOTOMY      transformal injections      x 2    WISDOM TOOTH EXTRACTION      WRIST FRACTURE SURGERY Left     WRIST HARDWARE REMOVAL Left 03/17/2017    WRIST SURGERY Left 03/2017    tendon repair and hardware removal        Fam Hx:   malignancies: No    kidney stones: No     Soc Hx:  Lives in [place]    Allergies:  Tetracyclines    Medications: reviewed     Objective:   There were no vitals filed for this visit.    Physical Exam  HENT:      Head: Normocephalic.   Pulmonary:      Effort: Pulmonary effort is normal.   Abdominal:      General: There is no distension.      Palpations: Abdomen is soft.      Tenderness: There is no abdominal tenderness.   Musculoskeletal:         General: Normal range of motion.      Cervical back: Normal range of motion.   Skin:     General: Skin is warm.   Neurological:      Mental Status: He is alert and oriented to person, place, and time.   Psychiatric:         Mood and Affect: Mood normal.         Behavior: Behavior normal.           LABS REVIEW:  UA today:  urinated prior to OV    Assessment:       1. Benign prostatic hyperplasia with weak urinary stream    2. History of kidney stones          Plan:     Continue tamsulosin 0.4mg daily- refills prescribed  Update PSA through  PCP    F/u as needed    [facility]: Active    TIFFANI Galvez

## 2024-03-12 PROBLEM — J45.50 SEVERE PERSISTENT ASTHMA, UNCOMPLICATED: Status: ACTIVE | Noted: 2024-03-12

## 2024-03-13 PROBLEM — Z86.16 HISTORY OF COVID-19: Status: ACTIVE | Noted: 2024-03-13

## 2024-03-13 PROBLEM — J90 PLEURAL EFFUSION ON LEFT: Status: ACTIVE | Noted: 2024-03-13

## 2024-03-13 PROBLEM — I50.33 ACUTE ON CHRONIC DIASTOLIC CHF (CONGESTIVE HEART FAILURE): Status: ACTIVE | Noted: 2024-03-13

## 2024-03-13 PROBLEM — J18.9 PNEUMONIA: Status: ACTIVE | Noted: 2024-03-13

## 2024-03-13 PROBLEM — R07.89 ATYPICAL CHEST PAIN: Status: ACTIVE | Noted: 2021-10-06

## 2024-03-26 DIAGNOSIS — G43.719 INTRACTABLE CHRONIC MIGRAINE WITHOUT AURA AND WITHOUT STATUS MIGRAINOSUS: ICD-10-CM

## 2024-03-26 RX ORDER — LASMIDITAN 100 MG/1
100 TABLET ORAL DAILY PRN
Qty: 8 TABLET | Refills: 11 | Status: SHIPPED | OUTPATIENT
Start: 2024-03-26

## 2024-03-27 DIAGNOSIS — G43.719 INTRACTABLE CHRONIC MIGRAINE WITHOUT AURA AND WITHOUT STATUS MIGRAINOSUS: ICD-10-CM

## 2024-03-27 RX ORDER — FREMANEZUMAB-VFRM 225 MG/1.5ML
225 INJECTION SUBCUTANEOUS
Qty: 1 EACH | Refills: 11 | Status: SHIPPED | OUTPATIENT
Start: 2024-03-27

## 2024-06-03 PROBLEM — N17.9 ARF (ACUTE RENAL FAILURE): Status: RESOLVED | Noted: 2024-02-28 | Resolved: 2024-06-03

## 2024-06-14 PROBLEM — R06.02 SOB (SHORTNESS OF BREATH): Status: ACTIVE | Noted: 2024-06-14

## 2024-06-17 PROBLEM — J18.9 PNEUMONIA: Status: RESOLVED | Noted: 2024-03-13 | Resolved: 2024-06-17

## 2024-07-17 DIAGNOSIS — G43.719 INTRACTABLE CHRONIC MIGRAINE WITHOUT AURA AND WITHOUT STATUS MIGRAINOSUS: ICD-10-CM

## 2024-07-18 RX ORDER — ZONISAMIDE 100 MG/1
CAPSULE ORAL
Qty: 180 CAPSULE | Refills: 3 | Status: SHIPPED | OUTPATIENT
Start: 2024-07-18

## 2024-09-09 ENCOUNTER — OFFICE VISIT (OUTPATIENT)
Dept: NEUROLOGY | Facility: CLINIC | Age: 69
End: 2024-09-09
Payer: MEDICARE

## 2024-09-09 VITALS
BODY MASS INDEX: 28.9 KG/M2 | SYSTOLIC BLOOD PRESSURE: 142 MMHG | HEIGHT: 72 IN | WEIGHT: 213.38 LBS | DIASTOLIC BLOOD PRESSURE: 90 MMHG | HEART RATE: 98 BPM

## 2024-09-09 DIAGNOSIS — G43.719 INTRACTABLE CHRONIC MIGRAINE WITHOUT AURA AND WITHOUT STATUS MIGRAINOSUS: ICD-10-CM

## 2024-09-09 DIAGNOSIS — I10 ESSENTIAL HYPERTENSION: ICD-10-CM

## 2024-09-09 DIAGNOSIS — M54.81 BILATERAL OCCIPITAL NEURALGIA: Primary | ICD-10-CM

## 2024-09-09 DIAGNOSIS — Z98.84 HISTORY OF BARIATRIC SURGERY: ICD-10-CM

## 2024-09-09 PROCEDURE — 99999 PR PBB SHADOW E&M-EST. PATIENT-LVL IV: CPT | Mod: PBBFAC,,, | Performed by: PSYCHIATRY & NEUROLOGY

## 2024-09-09 PROCEDURE — 99214 OFFICE O/P EST MOD 30 MIN: CPT | Mod: S$PBB,,, | Performed by: PSYCHIATRY & NEUROLOGY

## 2024-09-09 PROCEDURE — 99214 OFFICE O/P EST MOD 30 MIN: CPT | Mod: PBBFAC,PO | Performed by: PSYCHIATRY & NEUROLOGY

## 2024-09-09 RX ORDER — ZONISAMIDE 100 MG/1
200 CAPSULE ORAL DAILY
Qty: 180 CAPSULE | Refills: 3 | Status: SHIPPED | OUTPATIENT
Start: 2024-09-09

## 2024-09-09 RX ORDER — ELETRIPTAN HYDROBROMIDE 40 MG/1
TABLET, FILM COATED ORAL
Qty: 24 TABLET | Refills: 3 | Status: SHIPPED | OUTPATIENT
Start: 2024-09-09

## 2024-09-09 RX ORDER — LASMIDITAN 50 MG/1
1 TABLET ORAL DAILY PRN
Qty: 8 TABLET | Refills: 5 | Status: SHIPPED | OUTPATIENT
Start: 2024-09-09

## 2024-09-09 NOTE — PROGRESS NOTES
"Subjective:       Patient ID: Booker Back is a 68 y.o. male.    Chief Complaint: Headache    INTERVAL HISTORY 09/09/2024  The patient presents for his yearly follow up. He reports that he lost his wife several months ago, she had a PE after hospitalization. He was quite sick in the hospital in March. He had massive headache improvement when they detoxify him from daily use of Tylenol and Ibuprofen. Lately, the headaches have started to "creep up." He is on Zonisamide 200 mg daily and Melatonin 10 mg for preventin. No longer on Ajovy. For acute attacks he takes Relpax and Reyvow. No change in habitual headche pattern. No red flags. Overall, pleased with current regimen     He is "the same." He recognizes that he suffers with MOH as the headache recurs after the abortive agent wears off. He takes Oxycontin BID and Percocet plus Tylenol plus Ibuprofen, plus Relpax and Reyvow. He tries to stay under 4,000 mg of Tylenol. For prevention he has tried multiple medications, currently on Ajovy. No change in habitual headache pattern. He presents to discuss strategies to break the MOH cycle.  INTERVAL HISTORY 8/1/2023  The patient presents for follow up after a long hiatus. He is "the same." He recognizes that he suffers with MOH as the headache recurs after the abortive agent wears off. He takes Oxycontin BID and Percocet plus Tylenol plus Ibuprofen, plus Relpax and Reyvow. He tries to stay under 4,000 mg of Tylenol. For prevention he has tried multiple medications, currently on Ajovy. No change in habitual headache pattern. He presents to discuss strategies to break the MOH cycle.    INTERVAL HISTORY 1/19/2022  The patient presents for a follow up. He is stable. Emgality had decreased the intensity but not the frequency eventually lost efficacy completely. He passed out on 1/15 while sitting in the bathroom and hit his forehead causing a large laceration requiring multiple stitches. CT of head negative. He is still in " medication overuse from OTC analgesics. In addition to the Tylenol and the opioid medication given by pain medicine. Moreover, he suffers with long Covid, unable to work. 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 He alternates medications for acute attack including Ubrelvy, Relpax, and OTC. He is acutely aware of MOH and is on a constant struggle to decrease use. Otherwise information below is still accurate and current.    INTERVAL HISTORY 1/21/2021  The patient presents for a follow up virtual visit. He is stable. Emgality has decreased the intensity but not the frequency which is still daily. He is still in medication overuse from OTC analgesics. Tylenol in addition to the Tylenol in the opioid medication. Otherwise information below is still accurate and current. He alternates medications for acute attack including Ubrelvy, Relpax, and OTC. He is acutely aware of MOH and is on a constant struggle to decrease use. Otherwise information below is still accurate and current.    INTERVAL HISTORY 10/22//2020  The patient presents for a follow up virtual visit. He is doing well and having headache free since on Emgality. However, it is only a couple of days per month. He is still in medication overuse from OTC analgesics. Otherwise information below is still accurate and current. He alternate medications for acute attack including Ubrelvy, Relpax, and OTC. He is acutely aware of MOH and is on a constant struggle to decrease use. Otherwise information below is still accurate and current.    INTERVAL HISTORY 4/20/2020  The patient presents for a follow up virtual visit. He is doing well and having headache frees since on Emgality. He is due for the next dose in the immediate future. He is  However, he is still in medication overuse from OTC analgesics. Otherwise information below is still accurate and current.      INTERVAL HISTORY 1/20/2020  The patient comes for follow up. He is stable. He has tried Emgality for 4  months and is beginning to see an improvement. He went to [place] over [place], forgot his Oxycodone and obtained #9 tabs from the ED to hold him until back home. Not taken Butalbital for 4 months. He is here to discuss treatment optimization. Otherwise information below is still accurate and current.      INTERVAL HISTORY 7/15/2019  The patient comes for follow up. He has a number of issues to discuss today and comes with a list of questions. His headaches are near daily or daily but he sees an improvement in that, he uses medication BID instead of QID for escalations on a daily basis. He states that recently, he went on a cruise to [place]. His wife forgot her OxyContin. He shared his Oxycodone with her and as a result he did not have enough for himself. The cruise physician gave them a prescription toward the end of the trip. He uses limited amounts of Relpax and Migranal. He is aware of the overall limit of treating escalations 10 days per month. He is on Aimovig 140 mg q28 days and Zonisamide 200 mg daily.    INTERVAL HISTORY 3/26/2019  The patient comes for follow up. He is benefiting from Aimovig, now at 140 mg every 28 days. He also benefited from Cymbalta but when he went to 60 mg he became very sedated. He is aware that he is in medication overuse headache and is perpetually trying to come off it. He would like to try strategies to break the cycle. He is here to discus options    INTERVAL HISTORY  The patient comes for follow up. He is significantly better. After taking Aimovig 70 mg, he reports about 60% overall improvement. He has noted some improvement with Gammacore but the headache tends to recur. He manages escalations starting with Zofran, then either Relpax or Migranal and rescues with Fioricet. He averages 10 Fioricet per month. He is also on Percocet for his back pain. He has not taken Percocet for 4 days in a row, 2 out of those days he had a headache. Last July, he had aphakia OS, ever since  "he has intermittent conjunctival redness. He was in a  tour this summer and did quite well overall.    HPI    The patient is a pleasant 62 y/o male with long time history of headaches. Over the years he has seen neurologists headache specialists and has received expert care. In addition to experiencing tension typt headache and migraine headache, he describes an episode of Ophthalmic Migraine, characterized by loss of vision lasting 2 to 3 hours, not followed by head pain which occurred 10 to 15 years prior to two episodes of Transient [employer] Amnesia. They happened in 2013 and they were about a month apart. He was admitted to the hospital where imaging was obtained as well as EEG all of which was reportedly negative. He was given the diagnosis of TGA, ex juvantivus. He states that it was after the episodes of TGA when he started having migraine headaches. About 12 years ago he was diagnosed with Medication overuse headache, he was taking Advil 400 mg twice daily. He saw Dr. Nettie Elena who advised him to stop taking Advil. His headaches were resolved but his back pain flared up. His back pain was so severe that he started taking Advil again resulting in headache relapse. In order to treat his back pain he underwent interventional procedures including MBB and RFA without significant relief. When offered a spinal cord stimulator he pursued a second opinion. Instead of having the SCS placed he decided to undergo trans foraminal injections. When done at one level worked about 30% but when given at 3 levels it worked very well. He ascribes a lot of his headaches to "sinus problems." He had 4 headaches last month of October, thus far he has had 4 headaches in November. This increased is attributed to recent sinus surgery. His past medical history is also noteworthy for bariatric surgery, a sleeve stomach reduction which has resulted in profound weight loss. He is overall pleased on his current regime which includes " Zonisamide 200 mg daily, Zofran 8 mg prn, Migranal alternating with Relpax and limited doses of Fioricet for rescue. For prevention, he has also tried Gabapentin for 1 month, poorly tolerated. Botox, 3 rounds by Dr. Ky Jensen provided no relief.  He is not a candidate for Topamax because of risk of kidney stones. He cannot take Beta blockers because of history of asthma. He has taken Elavil in the past for reactive depression with no effect on his headaches.   Please see details of headache characteristics headache below.  Headache questionnaire     1. When did your Headaches start?    Age 9     2. Where are your headaches located?   Usually left temple      3. Your headache's characteristics:  Excruciating, Pressure, Throbbing, Pounding, Stabbing, Like a tight band        4. How long does the headache last?  Hours, days     5. How often does the headache occur?  Worst when sinus problems        6. Are your headaches preceded or accompanied by other symptoms? yes  If yes, please describe. Sometimes photophobia, occasional aura        7. Does the headache awaken you at night? no  If so, how often?            8. Please laura the word that best describes your headache's intensity:    moderate        9. Using a scale of 1 through 10, with 0 = no pain and 10 = the worst pain:  What score is your headache now? 0  What score is your headache at its worst? 9  What score is your headache at its best? 1         10. Possible associated headache symptoms:  [x]  Sensitivity to light  [] Dizziness  [x] Nasal or sinus pressure/ pain   [x] Sensitivity to noise  [] Vertigo  [] Problems with concentration  [] Sensitivity to smells  [] Ringing in ears  [] Problems with memory    [] Blurred vision  [x] Irritability  [x] Problems with task completion    [] Double vision  [] Anger  [x]  Problems with relaxation  [x] Loss of appetite  [] Anxiety  [x] Neck tightness, Neck pain  [x] Nausea   [x] Nasal congestion  [] Vomiting            11. Headache improving factors:  [x] Sleep  [] Heat  [x] Darkness  [x] Ice  [x] Local pressure [] Menses (period)  [] Massage   [x] Medications:         12. Headache worsening factors:    [x] Fatigue [x] Sneezing  [x] Changes in Weather  [x] Light [] Bending Over [] Stress  [x] Noise [] Ovulation  [] Multiple Sclerosis Flare-Up  [] Smells  [] Menses  [] Food    [x] Coughing [] Alcohol        13. Number of caffeinated drinks per day: 0        14. Number of diet drinks per day: 0     Review of Systems   Constitutional:  Negative for appetite change, chills, fever. Positive for severe fatigue since Covid infection  HENT: Negative for dental problem, ear pain, hearing loss, tinnitus, trouble swallowing and voice change.    Eyes: Negative for photophobia, pain and visual disturbance.   Respiratory: Negative for cough, chest tightness and shortness of breath.    Cardiovascular: Negative for chest pain, palpitations and leg swelling.   Gastrointestinal: Negative for abdominal pain, blood in stool, constipation, diarrhea, nausea and vomiting.   Endocrine: Negative for cold intolerance and heat intolerance.   Genitourinary: Negative for difficulty urinating, dysuria and urgency.   Musculoskeletal: Negative for arthralgias, back pain, gait problem, myalgias, neck pain and neck stiffness.   Skin: Negative for color change, pallor and rash.   Neurological: Positive for headaches. Negative for dizziness, tremors, seizures, syncope, facial asymmetry, speech difficulty, weakness, light-headedness and numbness.   Hematological: Negative for adenopathy. Does not bruise/bleed easily.   Psychiatric/Behavioral: Negative for agitation, behavioral problems, confusion, decreased concentration, self-injury, sleep disturbance and suicidal ideas. The patient is not nervous/anxious.          Past Medical History   Diagnosis Date    Abnormal liver enzymes     Anemia     Arthritis     Asthma     Azotemia     Diverticulosis     Esophageal  reflux     H/O Nasal MRSA      Hepatitis, unspecified     Hypotension     Infectious mononucleosis     Insomnia     Instability of knee joint     Left Axillary Furuncle 1/26/16     Migraine     Obesity, unspecified     ALPHONSO on CPAP     Other and unspecified hyperlipidemia     Other seborrheic keratosis     Other specified disorder of male genital organs(608.89)     Pulmonary nodule     Renal stone     Seasonal allergic rhinitis     Snoring     Spontaneous ecchymoses     Unspecified essential hypertension     Unspecified vitamin D deficiency     Variants of migraine, not elsewhere classified, without mention of intractable migraine without mention of status migrainosus      Past Surgical History   Procedure Laterality Date    Appendectomy      Collins tooth extraction      Inguinal hernia repair      Wrist fracture surgery Left     Gastric sleeve  11/2012    Radiofrequency ablation       lumbar back    Esophageal dilation       2016    Transformal injections       x 2     Family History   Problem Relation Age of Onset    Hypertension Mother     Stroke Mother     Cancer Father      throat    Alcohol abuse Father     Heart disease Father     Heart attacks under age 50 Father 49     Social History     Social History    Marital status:      Spouse name: N/A    Number of children: N/A    Years of education: N/A     Occupational History    Not on file.     Social History Main Topics    Smoking status: Never Smoker    Smokeless tobacco: Not on file    Alcohol use No    Drug use: No    Sexual activity: Not on file     Other Topics Concern    Not on file     Social History Narrative     Allergies   Allergen Reactions    Tetracyclines Rash       Current Outpatient Medications   Medication Sig    acetaminophen (TYLENOL) 500 MG tablet Take 1 tablet by mouth 4 (four) times daily as needed for Pain. Indications: pain    albuterol (PROVENTIL) 2.5 mg /3 mL (0.083 %) nebulizer solution Take 2.5 mg by nebulization every 6 to 8  hours as needed.    albuterol (PROVENTIL/VENTOLIN HFA) 90 mcg/actuation inhaler Inhale 1 puff into the lungs as needed.    amLODIPine (NORVASC) 10 MG tablet Take 10 mg by mouth once daily.    atorvastatin (LIPITOR) 10 MG tablet Take 1 tablet (10 mg total) by mouth once daily.    BREO ELLIPTA 200-25 mcg/dose DsDv diskus inhaler Inhale 1 puff into the lungs once daily.    buPROPion (WELLBUTRIN XL) 300 MG 24 hr tablet Take 1 tablet (300 mg total) by mouth once daily.    carvediloL (COREG) 3.125 MG tablet Take 3.125 mg by mouth 2 (two) times daily with meals.    clopidogreL (PLAVIX) 75 mg tablet TAKE 1 TABLET BY MOUTH EVERY DAY    desloratadine (CLARINEX) 5 mg tablet Take 5 mg by mouth every evening.    DUPIXENT  mg/2 mL PnIj Inject 300 mg into the skin every 14 (fourteen) days.    irbesartan (AVAPRO) 300 MG tablet Take 1 tablet (300 mg total) by mouth once daily.    lansoprazole (PREVACID) 30 MG capsule Take 30 mg by mouth 2 (two) times a day.    lasmiditan (REYVOW) tablet 100 mg Take one tablet (100 mg) by mouth daily as needed (migraine).    melatonin 10 mg Tab Take 10 mg by mouth every evening.    metoclopramide HCl (REGLAN) 10 MG tablet Take 10 mg by mouth every 6 (six) hours as needed.    mirtazapine (REMERON SOL-TAB) 15 MG disintegrating tablet Take 15 mg by mouth.    montelukast (SINGULAIR) 10 mg tablet Take 10 mg by mouth every evening.    multivitamin-min-iron-FA-vit K (BARIATRIC MULTIVITAMINS) 45 mg iron- 800 mcg-120 mcg Cap Take 1 tablet by mouth every evening.    ondansetron (ZOFRAN-ODT) 8 MG TbDL Take 1 tablet (8 mg total) by mouth every 8 (eight) hours as needed (nausea).    oxyCODONE-acetaminophen (PERCOCET)  mg per tablet Take 1 tablet by mouth 4 (four) times daily as needed.    OXYCONTIN 20 mg 12 hr tablet Take 20 mg by mouth every 12 (twelve) hours.    polyethylene glycol (GLYCOLAX) 17 gram/dose powder Take 17 g by mouth every evening.    QNASL 80 mcg/actuation HFAA 1 spray by Nasal route  2 (two) times daily.    rimegepant (NURTEC) 75 mg odt One dissolving tablet on the tongue daily as needed for migraine. No more than once per day.    tamsulosin (FLOMAX) 0.4 mg Cap Take 1 capsule (0.4 mg total) by mouth once daily.    eletriptan (RELPAX) 40 MG tablet Take 1 po at onset of headache, may repeat in 2 hours if necessary. Max 2 per day, 2 days per week    lasmiditan (REYVOW) tablet 50 mg Take 1 tablet by mouth daily as needed (migraine).    zonisamide (ZONEGRAN) 100 MG Cap Take 2 capsules (200 mg total) by mouth once daily.     No current facility-administered medications for this visit.           Objective:      Vitals:    09/09/24 0952   BP: (!) 142/90   Pulse: 98     Body mass index is 28.94 kg/m².    Constitutional:   Neurological Exam:  General: well-developed, well-nourished, no distress  Mental status: Awake and alert  Speech language: No dysarthria or aphasia on conversation  Cranial nerves: Face symmetric  Motor: Moves all extremities well  Coordination: No ataxia. No tremor.     Data review:  Lab Results   Component Value Date     07/23/2024    K 3.3 (L) 07/23/2024    MG 2.0 03/13/2024     07/23/2024    CO2 28 07/23/2024    BUN 18 07/23/2024    CREATININE 1.16 07/23/2024    GLU 96 07/23/2024    HGBA1C 5.3 03/13/2024    AST 29 07/23/2024    ALT 20 07/23/2024    ALBUMIN 3.9 07/23/2024    PROT 7.2 07/23/2024    BILITOT 0.6 07/23/2024    CHOL 128 03/13/2024    HDL 38 (L) 03/13/2024    LDLCALC 61.8 (L) 03/13/2024    TRIG 141 03/13/2024       Lab Results   Component Value Date    WBC 7.90 08/21/2024    HGB 14.8 08/21/2024    HCT 43.7 08/21/2024    MCV 94 08/21/2024     08/21/2024       Lab Results   Component Value Date    TSH 5.420 (H) 03/13/2024               Assessment and Plan     Migraine without Aura, chronic, refractory to multiple interventions. On Zonisamide 200 mg daily, and Melatonin 10 mg for added prevention.   No longer on MOH from Tylenol and Ibuprofen, still on  chronic opioids. In remission for 6 months, headaches now creeping up. Acutely aware of MOH    Continue relpax and reyvow for escalations    D/C Ajovy 225 mg SC q28 days, during hospitalization, did not resume. Failed Aimovig and Emgality.    Continue Zonisamide 200 mg QHS     Consider Qulipta or Vyepti if needed in the future    RTC in about a year          Dana Conn M.D  Medical Director, Headache and Facial Pain  [facility]

## 2024-11-05 DIAGNOSIS — R53.83 FATIGUE, UNSPECIFIED TYPE: Primary | ICD-10-CM

## 2024-11-06 ENCOUNTER — PATIENT MESSAGE (OUTPATIENT)
Dept: UROLOGY | Facility: CLINIC | Age: 69
End: 2024-11-06
Payer: MEDICARE

## 2025-02-28 PROBLEM — F32.A DEPRESSION: Status: ACTIVE | Noted: 2025-02-28

## 2025-05-11 DIAGNOSIS — N40.1 BENIGN LOCALIZED PROSTATIC HYPERPLASIA WITH LOWER URINARY TRACT SYMPTOMS (LUTS): ICD-10-CM

## 2025-05-12 RX ORDER — TAMSULOSIN HYDROCHLORIDE 0.4 MG/1
CAPSULE ORAL
Qty: 90 CAPSULE | Refills: 3 | Status: SHIPPED | OUTPATIENT
Start: 2025-05-12

## 2025-05-13 ENCOUNTER — TELEPHONE (OUTPATIENT)
Dept: RHEUMATOLOGY | Facility: CLINIC | Age: 70
End: 2025-05-13
Payer: MEDICARE

## 2025-05-13 NOTE — TELEPHONE ENCOUNTER
----- Message from Nurse Hinojosa sent at 5/12/2025  4:31 PM CDT -----    ----- Message -----  From: Patricia Rodriguez  Sent: 5/12/2025   4:22 PM CDT  To: [facility] Staff    Type: Needs Medical AdviceWho Called:  pt Best Call Back Number: [phone] Information: requesting call back in regards to scheduling a new patient , he is not a current patient dr has seen his wife but insisted agent send a message for nurse to call please advise